# Patient Record
Sex: FEMALE | Race: WHITE | NOT HISPANIC OR LATINO | Employment: UNEMPLOYED | URBAN - NONMETROPOLITAN AREA
[De-identification: names, ages, dates, MRNs, and addresses within clinical notes are randomized per-mention and may not be internally consistent; named-entity substitution may affect disease eponyms.]

---

## 2017-01-05 ENCOUNTER — OFFICE VISIT (OUTPATIENT)
Dept: PAIN MEDICINE | Facility: CLINIC | Age: 62
End: 2017-01-05

## 2017-01-05 VITALS
WEIGHT: 278.5 LBS | DIASTOLIC BLOOD PRESSURE: 60 MMHG | BODY MASS INDEX: 46.4 KG/M2 | HEIGHT: 65 IN | SYSTOLIC BLOOD PRESSURE: 120 MMHG

## 2017-01-05 DIAGNOSIS — M79.18 MYOFASCIAL PAIN: ICD-10-CM

## 2017-01-05 DIAGNOSIS — M47.817 LUMBOSACRAL SPONDYLOSIS WITHOUT MYELOPATHY: Primary | ICD-10-CM

## 2017-01-05 DIAGNOSIS — Z79.899 HIGH RISK MEDICATIONS (NOT ANTICOAGULANTS) LONG-TERM USE: ICD-10-CM

## 2017-01-05 PROCEDURE — 99213 OFFICE O/P EST LOW 20 MIN: CPT | Performed by: PAIN MEDICINE

## 2017-01-05 RX ORDER — HYDROCODONE BITARTRATE AND ACETAMINOPHEN 7.5; 325 MG/1; MG/1
1 TABLET ORAL 4 TIMES DAILY
Qty: 120 TABLET | Refills: 0 | Status: SHIPPED | OUTPATIENT
Start: 2017-01-05 | End: 2017-02-04

## 2017-01-05 NOTE — PROGRESS NOTES
Mila Fisher is a 61 y.o. female.   1955    HPI:   Location: neck and lower back  Quality: aching, sharp and dull  Severity: 4/10  Timing: constant  Alleviating: pain medication  Aggravating: increased activity    Opioid providing good relief and allows increased activity.  Losing some weight while trying.  Doing well from a pain standpoint on current therapy.    The following portions of the patient's history were reviewed by me and updated as appropriate: allergies, current medications, past family history, past medical history, past social history, past surgical history and problem list.    Past Medical History   Diagnosis Date   • Acute bronchitis    • Anxiety      Anxiety state      • Arthropathy of lumbar facet joint    • Asthma    • Astigmatism    • At risk for adverse drug event      Adverse drug event resulting from treatment of disorder      • Back ache    • Benign essential hypertension    • Cholecystitis      mild on ultrasound      • Chronic back pain    • Contact dermatitis    • Cough    • Depression      Depressive disorder, not elsewhere classified      • Diabetes mellitus      no retinopathy      • Diabetes mellitus without complication      type II or unspecified type, not stated as uncontrolled      • Diarrhea    • Drug therapy      Other long term (current) drug therapy      • Dyspnea    • Electrocardiogram abnormal    • Epigastric pain    • Episodic mood disorder      Unspecified    • Esophageal reflux    • Esophagitis      grade II   • Foot pain      VS SMALL PHALANX AVULSION      • Generalized abdominal pain    • Generalized anxiety disorder    • History of colon polyps    • Hypermetropia    • Irritable bowel syndrome    • Knee pain 03/22/2016      being evaluated for knee replacements.       • Malaise and fatigue    • Myofascial pain    • Nausea and vomiting    • Neck pain    • LJ (obstructive sleep apnea)    • Osteoarthritis    • Pain in wrist    • Polyp of intestine       large intestine   • Pure hypercholesterolemia    • Right upper quadrant pain    • Screening for hyperlipidemia    • Shoulder pain 09/02/2014     possible Rotator Cuff Tendinitis      • Spasm of back muscles    • Sprain of sacroiliac ligament    • Transient cerebral ischemia    • Type 2 diabetes mellitus    • Type II diabetes mellitus, uncontrolled    • Upper respiratory infection    • Vitamin D deficiency        Social History     Social History   • Marital status:      Spouse name: N/A   • Number of children: N/A   • Years of education: N/A     Occupational History   • Not on file.     Social History Main Topics   • Smoking status: Former Smoker     Quit date: 1996   • Smokeless tobacco: Never Used      Comment: non smoker for years   • Alcohol use No   • Drug use: No   • Sexual activity: Defer     Other Topics Concern   • Not on file     Social History Narrative       Family History   Problem Relation Age of Onset   • Heart disease Other    • Bone cancer Other    • Lung cancer Other    • Throat cancer Other    • Cancer Other      Other   • Colon cancer Other      Colorectal Cancer   • Diabetes Other    • Hypertension Other          Current Outpatient Prescriptions:   •  albuterol (PROVENTIL HFA;VENTOLIN HFA) 108 (90 BASE) MCG/ACT inhaler, Inhale 2 puffs Every 4 (Four) Hours As Needed for wheezing., Disp: , Rfl:   •  albuterol (PROVENTIL) (2.5 MG/3ML) 0.083% nebulizer solution, Take 2.5 mg by nebulization Every 4 (Four) Hours As Needed for wheezing., Disp: , Rfl:   •  baclofen (LIORESAL) 10 MG tablet, Take 10 mg by mouth 3 (Three) Times a Day., Disp: , Rfl:   •  carvedilol (COREG) 6.25 MG tablet, Take 6.25 mg by mouth 2 (Two) Times a Day With Meals., Disp: , Rfl:   •  clopidogrel (PLAVIX) 75 MG tablet, Take 75 mg by mouth Daily., Disp: , Rfl:   •  dicyclomine (BENTYL) 10 MG capsule, Take 10 mg by mouth 4 (Four) Times a Day Before Meals & at Bedtime., Disp: , Rfl:   •  furosemide (LASIX) 80 MG tablet, Take  80 mg by mouth 2 (Two) Times a Day., Disp: , Rfl:   •  hydrOXYzine (VISTARIL) 50 MG capsule, Take 50 mg by mouth 3 (Three) Times a Day As Needed for itching., Disp: , Rfl:   •  insulin aspart protamine-insulin aspart (novoLOG 70/30) (70-30) 100 UNIT/ML injection, Inject  under the skin 2 (Two) Times a Day With Meals., Disp: , Rfl:   •  isosorbide mononitrate (IMDUR) 30 MG 24 hr tablet, Take 30 mg by mouth Daily., Disp: , Rfl:   •  lisinopril (PRINIVIL,ZESTRIL) 5 MG tablet, Take 5 mg by mouth Daily., Disp: , Rfl:   •  losartan (COZAAR) 50 MG tablet, Take 50 mg by mouth Daily., Disp: , Rfl:   •  magnesium oxide (MAGOX) 400 (241.3 MG) MG tablet tablet, Take 400 mg by mouth Daily., Disp: , Rfl:   •  meloxicam (MOBIC) 15 MG tablet, Take 15 mg by mouth Daily., Disp: , Rfl:   •  metFORMIN (GLUCOPHAGE) 1000 MG tablet, Take 1,000 mg by mouth 2 (Two) Times a Day With Meals., Disp: , Rfl:   •  metoprolol succinate XL (TOPROL-XL) 25 MG 24 hr tablet, Take 25 mg by mouth Daily., Disp: , Rfl:   •  pantoprazole (PROTONIX) 40 MG EC tablet, Take 40 mg by mouth Daily., Disp: , Rfl:   •  pravastatin (PRAVACHOL) 40 MG tablet, Take 40 mg by mouth Daily., Disp: , Rfl:   •  spironolactone (ALDACTONE) 25 MG tablet, , Disp: , Rfl: 11  •  vitamin D (ERGOCALCIFEROL) 30276 UNITS capsule capsule, Take 50,000 Units by mouth 1 (One) Time Per Week., Disp: , Rfl:   •  HYDROcodone-acetaminophen (NORCO) 7.5-325 MG per tablet, Take 1 tablet by mouth 4 (Four) Times a Day for 30 days., Disp: 120 tablet, Rfl: 0  •  HYDROcodone-acetaminophen (NORCO) 7.5-325 MG per tablet, Take 1 tablet by mouth 4 (Four) Times a Day for 30 days., Disp: 120 tablet, Rfl: 0    No Known Allergies      Review of Systems   Musculoskeletal: Positive for back pain (lower) and neck pain.     10 system review of systems was reviewed and negative except for above.    Physical Exam   Constitutional: She appears well-developed and well-nourished. No distress.   Musculoskeletal:         Lumbar back: She exhibits decreased range of motion (<5 deg ext with facet loading.  45 deg of flexion with pain) and tenderness.   Neurological: She is alert. Gait (antalgic) abnormal.   Psychiatric: She has a normal mood and affect. Her behavior is normal. Judgment normal.       Mila was seen today for neck pain and back pain.    Diagnoses and all orders for this visit:    Lumbosacral spondylosis without myelopathy    Myofascial pain    High risk medications (not anticoagulants) long-term use    Other orders  -     HYDROcodone-acetaminophen (NORCO) 7.5-325 MG per tablet; Take 1 tablet by mouth 4 (Four) Times a Day for 30 days.  -     HYDROcodone-acetaminophen (NORCO) 7.5-325 MG per tablet; Take 1 tablet by mouth 4 (Four) Times a Day for 30 days.        Medication: Patient reports no negative side effects, Patient reports appropriate usage and storage habits, Patient's opioid provides enough reflief to be more active and perform activities of daily living with less discomfort. and Refill opioid medication as above    Interventional: none at this time.  Pt currently not interested.  Has had shoulder injections in past an did not like these.    Rehab: none at this time.  Continues to try to lose wt. This was encouraged.    Behavioral: No aberrant behavior noted. [facility]AVYA Report #[id number]  was reviewed and is consistent with stated history    Urine drug screen None at this time          This document has been electronically signed by Bryce Edmonds MD on January 5, 2017 8:25 AM

## 2017-01-11 RX ORDER — DICYCLOMINE HCL 20 MG
TABLET ORAL
Qty: 60 TABLET | Refills: 0 | Status: SHIPPED | OUTPATIENT
Start: 2017-01-11 | End: 2017-03-15 | Stop reason: HOSPADM

## 2017-02-08 ENCOUNTER — APPOINTMENT (OUTPATIENT)
Dept: GENERAL RADIOLOGY | Facility: HOSPITAL | Age: 62
End: 2017-02-08

## 2017-02-08 ENCOUNTER — HOSPITAL ENCOUNTER (OUTPATIENT)
Facility: HOSPITAL | Age: 62
Setting detail: OBSERVATION
Discharge: HOME | End: 2017-02-09
Attending: EMERGENCY MEDICINE | Admitting: FAMILY MEDICINE

## 2017-02-08 DIAGNOSIS — R07.2 CHEST PAIN, PRECORDIAL: Primary | ICD-10-CM

## 2017-02-08 LAB
ALBUMIN SERPL-MCNC: 3.9 G/DL (ref 3.4–4.8)
ALBUMIN/GLOB SERPL: 1.4 G/DL (ref 1.1–1.8)
ALP SERPL-CCNC: 50 U/L (ref 38–126)
ALT SERPL W P-5'-P-CCNC: 26 U/L (ref 9–52)
ANION GAP SERPL CALCULATED.3IONS-SCNC: 9 MMOL/L (ref 5–15)
AST SERPL-CCNC: 27 U/L (ref 14–36)
BASOPHILS # BLD AUTO: 0.02 10*3/MM3 (ref 0–0.2)
BASOPHILS NFR BLD AUTO: 0.3 % (ref 0–2)
BILIRUB SERPL-MCNC: 0.3 MG/DL (ref 0.2–1.3)
BUN BLD-MCNC: 23 MG/DL (ref 7–21)
BUN/CREAT SERPL: 21.7 (ref 7–25)
CALCIUM SPEC-SCNC: 9.4 MG/DL (ref 8.4–10.2)
CHLORIDE SERPL-SCNC: 103 MMOL/L (ref 95–110)
CK MB SERPL-CCNC: 0.68 NG/ML (ref 0–5)
CK SERPL-CCNC: 75 U/L (ref 30–135)
CO2 SERPL-SCNC: 28 MMOL/L (ref 22–31)
CREAT BLD-MCNC: 1.06 MG/DL (ref 0.5–1)
D-DIMER, QUANTITATIVE (MAD,POW, STR): <270 NG/ML (FEU) (ref 0–470)
DEPRECATED RDW RBC AUTO: 42.8 FL (ref 36.4–46.3)
EOSINOPHIL # BLD AUTO: 0.16 10*3/MM3 (ref 0–0.7)
EOSINOPHIL NFR BLD AUTO: 2.3 % (ref 0–7)
ERYTHROCYTE [DISTWIDTH] IN BLOOD BY AUTOMATED COUNT: 14.3 % (ref 11.5–14.5)
GFR SERPL CREATININE-BSD FRML MDRD: 52 ML/MIN/1.73 (ref 45–104)
GLOBULIN UR ELPH-MCNC: 2.7 GM/DL (ref 2.3–3.5)
GLUCOSE BLD-MCNC: 192 MG/DL (ref 60–100)
GLUCOSE BLDC GLUCOMTR-MCNC: 153 MG/DL (ref 70–130)
HCT VFR BLD AUTO: 30.9 % (ref 35–45)
HGB BLD-MCNC: 10.6 G/DL (ref 12–15.5)
IMM GRANULOCYTES # BLD: 0.02 10*3/MM3 (ref 0–0.02)
IMM GRANULOCYTES NFR BLD: 0.3 % (ref 0–0.5)
LYMPHOCYTES # BLD AUTO: 2.78 10*3/MM3 (ref 0.6–4.2)
LYMPHOCYTES NFR BLD AUTO: 40.7 % (ref 10–50)
MCH RBC QN AUTO: 28.1 PG (ref 26.5–34)
MCHC RBC AUTO-ENTMCNC: 34.3 G/DL (ref 31.4–36)
MCV RBC AUTO: 82 FL (ref 80–98)
MONOCYTES # BLD AUTO: 0.43 10*3/MM3 (ref 0–0.9)
MONOCYTES NFR BLD AUTO: 6.3 % (ref 0–12)
NEUTROPHILS # BLD AUTO: 3.42 10*3/MM3 (ref 2–8.6)
NEUTROPHILS NFR BLD AUTO: 50.1 % (ref 37–80)
NT-PROBNP SERPL-MCNC: 284 PG/ML (ref 0–900)
PLATELET # BLD AUTO: 166 10*3/MM3 (ref 150–450)
PMV BLD AUTO: 11.2 FL (ref 8–12)
POTASSIUM BLD-SCNC: 3.9 MMOL/L (ref 3.5–5.1)
PROT SERPL-MCNC: 6.6 G/DL (ref 6.3–8.6)
RBC # BLD AUTO: 3.77 10*6/MM3 (ref 3.77–5.16)
SODIUM BLD-SCNC: 140 MMOL/L (ref 137–145)
TROPONIN I SERPL-MCNC: <0.012 NG/ML
WBC NRBC COR # BLD: 6.83 10*3/MM3 (ref 3.2–9.8)

## 2017-02-08 PROCEDURE — 84484 ASSAY OF TROPONIN QUANT: CPT | Performed by: STUDENT IN AN ORGANIZED HEALTH CARE EDUCATION/TRAINING PROGRAM

## 2017-02-08 PROCEDURE — 82550 ASSAY OF CK (CPK): CPT | Performed by: STUDENT IN AN ORGANIZED HEALTH CARE EDUCATION/TRAINING PROGRAM

## 2017-02-08 PROCEDURE — 93005 ELECTROCARDIOGRAM TRACING: CPT

## 2017-02-08 PROCEDURE — 99285 EMERGENCY DEPT VISIT HI MDM: CPT

## 2017-02-08 PROCEDURE — 85025 COMPLETE CBC W/AUTO DIFF WBC: CPT | Performed by: STUDENT IN AN ORGANIZED HEALTH CARE EDUCATION/TRAINING PROGRAM

## 2017-02-08 PROCEDURE — 82553 CREATINE MB FRACTION: CPT | Performed by: STUDENT IN AN ORGANIZED HEALTH CARE EDUCATION/TRAINING PROGRAM

## 2017-02-08 PROCEDURE — 82962 GLUCOSE BLOOD TEST: CPT

## 2017-02-08 PROCEDURE — 80053 COMPREHEN METABOLIC PANEL: CPT | Performed by: STUDENT IN AN ORGANIZED HEALTH CARE EDUCATION/TRAINING PROGRAM

## 2017-02-08 PROCEDURE — G0378 HOSPITAL OBSERVATION PER HR: HCPCS

## 2017-02-08 PROCEDURE — 83880 ASSAY OF NATRIURETIC PEPTIDE: CPT | Performed by: STUDENT IN AN ORGANIZED HEALTH CARE EDUCATION/TRAINING PROGRAM

## 2017-02-08 PROCEDURE — 85379 FIBRIN DEGRADATION QUANT: CPT | Performed by: STUDENT IN AN ORGANIZED HEALTH CARE EDUCATION/TRAINING PROGRAM

## 2017-02-08 PROCEDURE — 93010 ELECTROCARDIOGRAM REPORT: CPT | Performed by: INTERNAL MEDICINE

## 2017-02-08 RX ORDER — ASPIRIN 325 MG
325 TABLET ORAL ONCE
Status: COMPLETED | OUTPATIENT
Start: 2017-02-08 | End: 2017-02-08

## 2017-02-08 RX ORDER — DEXTROSE MONOHYDRATE 25 G/50ML
25 INJECTION, SOLUTION INTRAVENOUS
Status: DISCONTINUED | OUTPATIENT
Start: 2017-02-08 | End: 2017-02-09 | Stop reason: HOSPADM

## 2017-02-08 RX ORDER — NICOTINE POLACRILEX 4 MG
15 LOZENGE BUCCAL
Status: DISCONTINUED | OUTPATIENT
Start: 2017-02-08 | End: 2017-02-09 | Stop reason: HOSPADM

## 2017-02-08 RX ADMIN — ASPIRIN 325 MG: 325 TABLET, COATED ORAL at 19:54

## 2017-02-08 RX ADMIN — NITROGLYCERIN 1 INCH: 20 OINTMENT TOPICAL at 19:57

## 2017-02-08 NOTE — ED NOTES
Pt. Referred to the ED by Urgent Care for chest pain under her left breast, and shortness of breath. Ticket from urgent care noted left sided pitting edema also.     Rahel Gutierrez RN  02/08/17 1352

## 2017-02-09 VITALS
RESPIRATION RATE: 16 BRPM | SYSTOLIC BLOOD PRESSURE: 150 MMHG | HEART RATE: 52 BPM | HEIGHT: 65 IN | DIASTOLIC BLOOD PRESSURE: 75 MMHG | BODY MASS INDEX: 47.24 KG/M2 | OXYGEN SATURATION: 95 % | WEIGHT: 283.56 LBS | TEMPERATURE: 96.5 F

## 2017-02-09 LAB
ALBUMIN SERPL-MCNC: 3.6 G/DL (ref 3.4–4.8)
ALBUMIN/GLOB SERPL: 1.4 G/DL (ref 1.1–1.8)
ALP SERPL-CCNC: 48 U/L (ref 38–126)
ALT SERPL W P-5'-P-CCNC: 16 U/L (ref 9–52)
ANION GAP SERPL CALCULATED.3IONS-SCNC: 7 MMOL/L (ref 5–15)
AST SERPL-CCNC: 15 U/L (ref 14–36)
BASOPHILS # BLD AUTO: 0.02 10*3/MM3 (ref 0–0.2)
BASOPHILS NFR BLD AUTO: 0.4 % (ref 0–2)
BILIRUB SERPL-MCNC: 0.3 MG/DL (ref 0.2–1.3)
BUN BLD-MCNC: 23 MG/DL (ref 7–21)
BUN/CREAT SERPL: 22.5 (ref 7–25)
CALCIUM SPEC-SCNC: 9.1 MG/DL (ref 8.4–10.2)
CHLORIDE SERPL-SCNC: 103 MMOL/L (ref 95–110)
CO2 SERPL-SCNC: 27 MMOL/L (ref 22–31)
CREAT BLD-MCNC: 1.02 MG/DL (ref 0.5–1)
D-DIMER, QUANTITATIVE (MAD,POW, STR): <270 NG/ML (FEU) (ref 0–470)
DEPRECATED RDW RBC AUTO: 40 FL (ref 36.4–46.3)
EOSINOPHIL # BLD AUTO: 0.1 10*3/MM3 (ref 0–0.7)
EOSINOPHIL NFR BLD AUTO: 2.1 % (ref 0–7)
ERYTHROCYTE [DISTWIDTH] IN BLOOD BY AUTOMATED COUNT: 13.3 % (ref 11.5–14.5)
GFR SERPL CREATININE-BSD FRML MDRD: 55 ML/MIN/1.73 (ref 45–104)
GLOBULIN UR ELPH-MCNC: 2.6 GM/DL (ref 2.3–3.5)
GLUCOSE BLD-MCNC: 181 MG/DL (ref 60–100)
GLUCOSE BLDC GLUCOMTR-MCNC: 192 MG/DL (ref 70–130)
GLUCOSE BLDC GLUCOMTR-MCNC: 275 MG/DL (ref 70–130)
HCT VFR BLD AUTO: 36.7 % (ref 35–45)
HGB BLD-MCNC: 12.1 G/DL (ref 12–15.5)
HOLD SPECIMEN: NORMAL
HOLD SPECIMEN: NORMAL
IMM GRANULOCYTES # BLD: 0.01 10*3/MM3 (ref 0–0.02)
IMM GRANULOCYTES NFR BLD: 0.2 % (ref 0–0.5)
LYMPHOCYTES # BLD AUTO: 2.04 10*3/MM3 (ref 0.6–4.2)
LYMPHOCYTES NFR BLD AUTO: 42.2 % (ref 10–50)
MCH RBC QN AUTO: 27.4 PG (ref 26.5–34)
MCHC RBC AUTO-ENTMCNC: 33 G/DL (ref 31.4–36)
MCV RBC AUTO: 83.2 FL (ref 80–98)
MONOCYTES # BLD AUTO: 0.28 10*3/MM3 (ref 0–0.9)
MONOCYTES NFR BLD AUTO: 5.8 % (ref 0–12)
NEUTROPHILS # BLD AUTO: 2.38 10*3/MM3 (ref 2–8.6)
NEUTROPHILS NFR BLD AUTO: 49.3 % (ref 37–80)
PLATELET # BLD AUTO: 126 10*3/MM3 (ref 150–450)
PMV BLD AUTO: 10.8 FL (ref 8–12)
POTASSIUM BLD-SCNC: 3.9 MMOL/L (ref 3.5–5.1)
PROT SERPL-MCNC: 6.2 G/DL (ref 6.3–8.6)
RBC # BLD AUTO: 4.41 10*6/MM3 (ref 3.77–5.16)
SODIUM BLD-SCNC: 137 MMOL/L (ref 137–145)
TROPONIN I SERPL-MCNC: <0.012 NG/ML
WBC NRBC COR # BLD: 4.83 10*3/MM3 (ref 3.2–9.8)
WHOLE BLOOD HOLD SPECIMEN: NORMAL
WHOLE BLOOD HOLD SPECIMEN: NORMAL

## 2017-02-09 PROCEDURE — 82962 GLUCOSE BLOOD TEST: CPT

## 2017-02-09 PROCEDURE — G0378 HOSPITAL OBSERVATION PER HR: HCPCS

## 2017-02-09 PROCEDURE — 63710000001 INSULIN ASPART PER 5 UNITS: Performed by: INTERNAL MEDICINE

## 2017-02-09 PROCEDURE — 85025 COMPLETE CBC W/AUTO DIFF WBC: CPT | Performed by: INTERNAL MEDICINE

## 2017-02-09 PROCEDURE — 85379 FIBRIN DEGRADATION QUANT: CPT | Performed by: INTERNAL MEDICINE

## 2017-02-09 PROCEDURE — 84484 ASSAY OF TROPONIN QUANT: CPT | Performed by: INTERNAL MEDICINE

## 2017-02-09 PROCEDURE — 80053 COMPREHEN METABOLIC PANEL: CPT | Performed by: INTERNAL MEDICINE

## 2017-02-09 RX ORDER — CLOPIDOGREL BISULFATE 75 MG/1
75 TABLET ORAL DAILY
Status: DISCONTINUED | OUTPATIENT
Start: 2017-02-09 | End: 2017-02-09 | Stop reason: HOSPADM

## 2017-02-09 RX ORDER — PANTOPRAZOLE SODIUM 40 MG/1
40 TABLET, DELAYED RELEASE ORAL DAILY
Status: DISCONTINUED | OUTPATIENT
Start: 2017-02-09 | End: 2017-02-09 | Stop reason: HOSPADM

## 2017-02-09 RX ORDER — FUROSEMIDE 40 MG/1
80 TABLET ORAL 2 TIMES DAILY
Status: DISCONTINUED | OUTPATIENT
Start: 2017-02-09 | End: 2017-02-09 | Stop reason: HOSPADM

## 2017-02-09 RX ORDER — LOSARTAN POTASSIUM 50 MG/1
50 TABLET ORAL DAILY
Status: DISCONTINUED | OUTPATIENT
Start: 2017-02-09 | End: 2017-02-09 | Stop reason: HOSPADM

## 2017-02-09 RX ORDER — ALBUTEROL SULFATE 2.5 MG/3ML
2.5 SOLUTION RESPIRATORY (INHALATION) EVERY 4 HOURS PRN
Status: DISCONTINUED | OUTPATIENT
Start: 2017-02-09 | End: 2017-02-09 | Stop reason: HOSPADM

## 2017-02-09 RX ORDER — PRAVASTATIN SODIUM 40 MG
40 TABLET ORAL DAILY
Status: DISCONTINUED | OUTPATIENT
Start: 2017-02-09 | End: 2017-02-09 | Stop reason: HOSPADM

## 2017-02-09 RX ORDER — LISINOPRIL 5 MG/1
5 TABLET ORAL DAILY
Status: DISCONTINUED | OUTPATIENT
Start: 2017-02-09 | End: 2017-02-09 | Stop reason: HOSPADM

## 2017-02-09 RX ORDER — DICYCLOMINE HYDROCHLORIDE 10 MG/1
10 CAPSULE ORAL
Status: DISCONTINUED | OUTPATIENT
Start: 2017-02-09 | End: 2017-02-09 | Stop reason: HOSPADM

## 2017-02-09 RX ORDER — METOPROLOL SUCCINATE 25 MG/1
25 TABLET, EXTENDED RELEASE ORAL DAILY
Status: DISCONTINUED | OUTPATIENT
Start: 2017-02-09 | End: 2017-02-09 | Stop reason: HOSPADM

## 2017-02-09 RX ORDER — ERGOCALCIFEROL 1.25 MG/1
50000 CAPSULE ORAL WEEKLY
Status: DISCONTINUED | OUTPATIENT
Start: 2017-02-09 | End: 2017-02-09 | Stop reason: HOSPADM

## 2017-02-09 RX ORDER — ISOSORBIDE MONONITRATE 30 MG/1
30 TABLET, EXTENDED RELEASE ORAL DAILY
Status: DISCONTINUED | OUTPATIENT
Start: 2017-02-09 | End: 2017-02-09 | Stop reason: HOSPADM

## 2017-02-09 RX ORDER — ONDANSETRON 2 MG/ML
4 INJECTION INTRAMUSCULAR; INTRAVENOUS EVERY 6 HOURS PRN
Status: DISCONTINUED | OUTPATIENT
Start: 2017-02-09 | End: 2017-02-09 | Stop reason: HOSPADM

## 2017-02-09 RX ORDER — MELOXICAM 15 MG/1
15 TABLET ORAL DAILY
Status: DISCONTINUED | OUTPATIENT
Start: 2017-02-09 | End: 2017-02-09 | Stop reason: HOSPADM

## 2017-02-09 RX ORDER — CARVEDILOL 6.25 MG/1
6.25 TABLET ORAL 2 TIMES DAILY WITH MEALS
Status: DISCONTINUED | OUTPATIENT
Start: 2017-02-09 | End: 2017-02-09 | Stop reason: HOSPADM

## 2017-02-09 RX ORDER — HYDROXYZINE PAMOATE 50 MG/1
50 CAPSULE ORAL 3 TIMES DAILY PRN
Status: DISCONTINUED | OUTPATIENT
Start: 2017-02-09 | End: 2017-02-09 | Stop reason: HOSPADM

## 2017-02-09 RX ORDER — ACETAMINOPHEN 325 MG/1
650 TABLET ORAL EVERY 6 HOURS PRN
Status: DISCONTINUED | OUTPATIENT
Start: 2017-02-09 | End: 2017-02-09 | Stop reason: HOSPADM

## 2017-02-09 RX ORDER — ALBUTEROL SULFATE 2.5 MG/3ML
2.5 SOLUTION RESPIRATORY (INHALATION) EVERY 4 HOURS PRN
Status: DISCONTINUED | OUTPATIENT
Start: 2017-02-09 | End: 2017-02-09 | Stop reason: SDUPTHER

## 2017-02-09 RX ADMIN — INSULIN ASPART 4 UNITS: 100 INJECTION, SOLUTION INTRAVENOUS; SUBCUTANEOUS at 11:32

## 2017-02-09 RX ADMIN — CARVEDILOL 6.25 MG: 6.25 TABLET, FILM COATED ORAL at 07:53

## 2017-02-09 RX ADMIN — DICYCLOMINE HYDROCHLORIDE 10 MG: 10 CAPSULE ORAL at 10:42

## 2017-02-09 RX ADMIN — MAGNESIUM OXIDE TAB 400 MG (241.3 MG ELEMENTAL MG) 400 MG: 400 (241.3 MG) TAB at 08:13

## 2017-02-09 RX ADMIN — PANTOPRAZOLE SODIUM 40 MG: 40 TABLET, DELAYED RELEASE ORAL at 08:14

## 2017-02-09 RX ADMIN — MELOXICAM 15 MG: 15 TABLET ORAL at 08:14

## 2017-02-09 RX ADMIN — INSULIN ASPART 2 UNITS: 100 INJECTION, SOLUTION INTRAVENOUS; SUBCUTANEOUS at 07:53

## 2017-02-09 RX ADMIN — LOSARTAN POTASSIUM 50 MG: 50 TABLET, FILM COATED ORAL at 08:13

## 2017-02-09 RX ADMIN — PRAVASTATIN SODIUM 40 MG: 40 TABLET ORAL at 08:14

## 2017-02-09 RX ADMIN — DICYCLOMINE HYDROCHLORIDE 10 MG: 10 CAPSULE ORAL at 08:13

## 2017-02-09 RX ADMIN — METFORMIN HYDROCHLORIDE 1000 MG: 500 TABLET ORAL at 08:14

## 2017-02-09 RX ADMIN — METOPROLOL SUCCINATE 25 MG: 25 TABLET, EXTENDED RELEASE ORAL at 08:13

## 2017-02-09 RX ADMIN — CLOPIDOGREL BISULFATE 75 MG: 75 TABLET ORAL at 08:13

## 2017-02-09 RX ADMIN — FUROSEMIDE 80 MG: 40 TABLET ORAL at 08:13

## 2017-02-09 RX ADMIN — ISOSORBIDE MONONITRATE 30 MG: 30 TABLET, EXTENDED RELEASE ORAL at 08:13

## 2017-02-09 RX ADMIN — LISINOPRIL 5 MG: 5 TABLET ORAL at 08:13

## 2017-02-09 NOTE — CONSULTS
"Adult Nutrition  Assessment    Patient Name:  Mila Fisher  YOB: 1955  MRN: [id number]  Admit Date:  2/8/2017    Assessment Date:  2/9/2017                            Comments:  Visited due to BMI of 47.2 which is compatible with morbid obesity.  Pt also with hx of DM. Education provided on \"Healthy weight\" and Diabetes Guidelines.  Rd will monitor prn.        Electronically signed by:  Cara Vang RD  02/09/17 11:05 AM  "

## 2017-02-09 NOTE — H&P
[facility] Medicine Admission      Date of Admission: 2/8/2017      Primary Care Physician: Casey López MD      Chief Complaint: Chest pain    HPI: Currently stated that chest pains resolved is not complaining of any shortness of breath feels okay    Past Medical History:   Past Medical History   Diagnosis Date   • Acute bronchitis    • Anxiety      Anxiety state      • Arthropathy of lumbar facet joint    • Asthma    • Astigmatism    • At risk for adverse drug event      Adverse drug event resulting from treatment of disorder      • Back ache    • Benign essential hypertension    • Cholecystitis      mild on ultrasound      • Chronic back pain    • Contact dermatitis    • COPD (chronic obstructive pulmonary disease)    • Cough    • Depression      Depressive disorder, not elsewhere classified      • Diabetes mellitus      no retinopathy      • Diabetes mellitus without complication      type II or unspecified type, not stated as uncontrolled      • Diarrhea    • Drug therapy      Other long term (current) drug therapy      • Dyspnea    • Electrocardiogram abnormal    • Epigastric pain    • Episodic mood disorder      Unspecified    • Esophageal reflux    • Esophagitis      grade II   • Foot pain      VS SMALL PHALANX AVULSION      • Generalized abdominal pain    • Generalized anxiety disorder    • History of colon polyps    • Hypermetropia    • Irritable bowel syndrome    • Knee pain 03/22/2016      being evaluated for knee replacements.       • Malaise and fatigue    • Myofascial pain    • Nausea and vomiting    • Neck pain    • LJ (obstructive sleep apnea)    • Osteoarthritis    • Pain in wrist    • Polyp of intestine      large intestine   • Pure hypercholesterolemia    • Right upper quadrant pain    • Screening for hyperlipidemia    • Shoulder pain 09/02/2014     possible Rotator Cuff Tendinitis      • Spasm of back muscles    • Sprain of sacroiliac ligament     • Transient cerebral ischemia    • Type 2 diabetes mellitus    • Type II diabetes mellitus, uncontrolled    • Upper respiratory infection    • Vitamin D deficiency        Past Surgical History:   Past Surgical History   Procedure Laterality Date   • Colonoscopy     • Upper gastrointestinal endoscopy     • Carpal tunnel release     • Colonoscopy w/ polypectomy  06/10/2015     Colonoscopy remove polyps 96125 (1)      • Injection of medication  09/02/2014     Drain/Inject Intermed Joint 18280 (1)      • Endoscopy  06/10/2015     EGD w/ biopsy 91483 (1)      • Tubal abdominal ligation       Tubal ligation (1)          Family History:   Family History   Problem Relation Age of Onset   • Heart disease Other    • Bone cancer Other    • Lung cancer Other    • Throat cancer Other    • Cancer Other      Other   • Colon cancer Other      Colorectal Cancer   • Diabetes Other    • Hypertension Other        Social History:   Social History     Social History   • Marital status:      Spouse name: N/A   • Number of children: N/A   • Years of education: N/A     Social History Main Topics   • Smoking status: Former Smoker     Quit date: 1996   • Smokeless tobacco: Never Used      Comment: non smoker for years   • Alcohol use No   • Drug use: No   • Sexual activity: Defer     Other Topics Concern   • None     Social History Narrative   • None       Allergies: No Known Allergies    Medications:   Prior to Admission medications    Medication Sig Start Date End Date Taking? Authorizing Provider   albuterol (PROVENTIL HFA;VENTOLIN HFA) 108 (90 BASE) MCG/ACT inhaler Inhale 2 puffs Every 4 (Four) Hours As Needed for wheezing.   Yes Historical Provider, MD   albuterol (PROVENTIL) (2.5 MG/3ML) 0.083% nebulizer solution Take 2.5 mg by nebulization Every 4 (Four) Hours As Needed for wheezing.   Yes Historical Provider, MD   clopidogrel (PLAVIX) 75 MG tablet Take 75 mg by mouth Daily.   Yes Historical Provider, MD   furosemide (LASIX)  80 MG tablet Take 80 mg by mouth 2 (Two) Times a Day.   Yes Historical Provider, MD   insulin aspart protamine-insulin aspart (novoLOG 70/30) (70-30) 100 UNIT/ML injection Inject  under the skin 2 (Two) Times a Day With Meals.   Yes Historical Provider, MD   lisinopril (PRINIVIL,ZESTRIL) 5 MG tablet Take 5 mg by mouth Daily.   Yes Historical Provider, MD   magnesium oxide (MAGOX) 400 (241.3 MG) MG tablet tablet Take 400 mg by mouth Daily.   Yes Historical Provider, MD   meloxicam (MOBIC) 15 MG tablet Take 15 mg by mouth Daily.   Yes Historical Provider, MD   metFORMIN (GLUCOPHAGE) 1000 MG tablet Take 1,000 mg by mouth 2 (Two) Times a Day With Meals.   Yes Historical Provider, MD   metoprolol succinate XL (TOPROL-XL) 25 MG 24 hr tablet Take 25 mg by mouth Daily.   Yes Historical Provider, MD   pantoprazole (PROTONIX) 40 MG EC tablet Take 40 mg by mouth Daily.   Yes Historical Provider, MD   pravastatin (PRAVACHOL) 40 MG tablet Take 40 mg by mouth Daily.   Yes Historical Provider, MD   spironolactone (ALDACTONE) 25 MG tablet  10/31/16  Yes Historical Provider, MD   vitamin D (ERGOCALCIFEROL) 01645 UNITS capsule capsule Take 50,000 Units by mouth 1 (One) Time Per Week.   Yes Historical Provider, MD   baclofen (LIORESAL) 10 MG tablet Take 10 mg by mouth 3 (Three) Times a Day.    Historical Provider, MD   carvedilol (COREG) 6.25 MG tablet Take 6.25 mg by mouth 2 (Two) Times a Day With Meals.    Historical Provider, MD   dicyclomine (BENTYL) 10 MG capsule Take 10 mg by mouth 4 (Four) Times a Day Before Meals & at Bedtime.    Historical Provider, MD   dicyclomine (BENTYL) 20 MG tablet TAKE 1 TABLET BY MOUTH EVERY 6 HOURS 1/11/17   Jarett Mcdonough PA-C   hydrOXYzine (VISTARIL) 50 MG capsule Take 50 mg by mouth 3 (Three) Times a Day As Needed for itching.    Historical Provider, MD   isosorbide mononitrate (IMDUR) 30 MG 24 hr tablet Take 30 mg by mouth Daily.    Historical Provider, MD   losartan (COZAAR) 50 MG tablet Take  50 mg by mouth Daily.    Historical Provider, MD       Review of Systems:  Review of Systems   Otherwise complete ROS is negative except as mentioned above.    Physical Exam:   Temp:  [98.4 °F (36.9 °C)-99.3 °F (37.4 °C)] 98.4 °F (36.9 °C)  Heart Rate:  [50-58] 50  Resp:  [18-20] 18  BP: (105-141)/(53-72) 123/58  Physical Exam  Patient appears well, alert and oriented x 3, pleasant, cooperative.   Neck supple  No JVD No thyromegaly.  REBEKA. Ears, throat are normal.  Lungs are clear to auscultation. No crackles no wheezing   CV S1S2 normal, no murmurs, clicks, gallops or rubs.  Abdomen is soft, no tenderness, masses or organomegaly.    Extremities: no clubbing cyanosis or edema   Neurological CN 2-12 intact   Skin is normal without suspicious lesions noted.    Results Reviewed:  I have personally reviewed current lab, radiology, and data and agree with results.  Lab Results (last 24 hours)     Procedure Component Value Units Date/Time    [place] Draw [[id number]] Collected:  02/08/17 1922    Specimen:  Blood Updated:  02/08/17 2005    Narrative:       The following orders were created for panel order [place] Draw.  Procedure                               Abnormality         Status                     ---------                               -----------         ------                     Light Blue Top[[id number]]                                    In process                 Green Top (Gel)[[id number]]                                   In process                 Lavender Top[[id number]]                                      In process                 Gold Top - SST[[id number]]                                    In process                   Please view results for these tests on the individual orders.    Green Top (Gel) [[id number]] Collected:  02/08/17 1922    Specimen:  Blood Updated:  02/08/17 2005    Light Blue Top [[id number]] Collected:  02/08/17 1922    Specimen:  Blood Updated:  02/08/17 2005    Gold Top - SST [[id number]]  Collected:  02/08/17 1922    Specimen:  Blood Updated:  02/08/17 2005    Lavender Top [[id number]] Collected:  02/08/17 1922    Specimen:  Blood Updated:  02/08/17 2005    D-dimer, Quantitative [[id number]]  (Normal) Collected:  02/08/17 1922    Specimen:  Blood Updated:  02/08/17 2023     D-Dimer, Quantitative <270 ng/mL (FEU)     Narrative:       Dimer values <500 ng/ml FEU are FDA approved as aid in diagnosis of deep venous thrombosis and pulmonary embolism.  This test should not be used in an exclusion strategy with pretest probability alone.    A recent guideline regarding diagnosis for pulmonary thomboembolism recommends an adjusted exclusion criterion of age x 10 ng/ml FEU for patients >50 years of age ([employer] Intern Med 2015; 163: 701-711).    CBC & Differential [[id number]] Collected:  02/08/17 1922    Specimen:  Blood Updated:  02/08/17 2054    Narrative:       The following orders were created for panel order CBC & Differential.  Procedure                               Abnormality         Status                     ---------                               -----------         ------                     CBC Auto Differential[[id number]]         Abnormal            Final result                 Please view results for these tests on the individual orders.    CBC Auto Differential [[id number]]  (Abnormal) Collected:  02/08/17 1922    Specimen:  Blood Updated:  02/08/17 2054     WBC 6.83 10*3/mm3      RBC 3.77 10*6/mm3      Hemoglobin 10.6 (L) g/dL      Hematocrit 30.9 (L) %      MCV 82.0 fL      MCH 28.1 pg      MCHC 34.3 g/dL      RDW 14.3 %      RDW-SD 42.8 fl      MPV 11.2 fL      Platelets 166 10*3/mm3      Neutrophil % 50.1 %      Lymphocyte % 40.7 %      Monocyte % 6.3 %      Eosinophil % 2.3 %      Basophil % 0.3 %      Immature Grans % 0.3 %      Neutrophils, Absolute 3.42 10*3/mm3      Lymphocytes, Absolute 2.78 10*3/mm3      Monocytes, Absolute 0.43 10*3/mm3      Eosinophils, Absolute 0.16 10*3/mm3      Basophils,  Absolute 0.02 10*3/mm3      Immature Grans, Absolute 0.02 10*3/mm3     Comprehensive Metabolic Panel [[id number]]  (Abnormal) Collected:  02/08/17 1922    Specimen:  Blood Updated:  02/08/17 2059     Glucose 192 (H) mg/dL      BUN 23 (H) mg/dL      Creatinine 1.06 (H) mg/dL      Sodium 140 mmol/L      Potassium 3.9 mmol/L      Chloride 103 mmol/L      CO2 28.0 mmol/L      Calcium 9.4 mg/dL      Total Protein 6.6 g/dL      Albumin 3.90 g/dL      ALT (SGPT) 26 U/L      AST (SGOT) 27 U/L      Alkaline Phosphatase 50 U/L      Total Bilirubin 0.3 mg/dL      eGFR Non African Amer 52 mL/min/1.73      Globulin 2.7 gm/dL      A/G Ratio 1.4 g/dL      BUN/Creatinine Ratio 21.7      Anion Gap 9.0 mmol/L     CK [[id number]]  (Normal) Collected:  02/08/17 1922    Specimen:  Blood Updated:  02/08/17 2059     Creatine Kinase 75 U/L     BNP [[id number]]  (Normal) Collected:  02/08/17 1922    Specimen:  Blood Updated:  02/08/17 2111     proBNP 284.0 pg/mL     Troponin [[id number]]  (Normal) Collected:  02/08/17 1922    Specimen:  Blood Updated:  02/08/17 2111     Troponin I <0.012 ng/mL     CK-MB [[id number]]  (Normal) Collected:  02/08/17 1922    Specimen:  Blood Updated:  02/08/17 2111     CKMB 0.68 ng/mL         Imaging Results (last 24 hours)     ** No results found for the last 24 hours. **            Assessment/Plan:     Hospital Problem List     Chest pain, precordial                Plan: Chest pain atypical evidence of acute coronary syndrome at this time but since the patient does have risk factors will observe patient overnight to check cardiac enzymes  Diabetes we'll put the patient on sliding scale insulin        Dakota Turcios MD  02/08/17  10:34 PM

## 2017-02-09 NOTE — PLAN OF CARE
Problem: Patient Care Overview (Adult)  Goal: Plan of Care Review  Outcome: Ongoing (interventions implemented as appropriate)    02/09/17 0637   Coping/Psychosocial Response Interventions   Plan Of Care Reviewed With patient   Patient Care Overview   Progress improving   Outcome Evaluation   Outcome Summary/Follow up Plan pt rested comfortably throughout the night         Problem: Acute Coronary Syndrome (ACS) (Adult)  Goal: Signs and Symptoms of Listed Potential Problems Will be Absent or Manageable (Acute Coronary Syndrome)  Outcome: Ongoing (interventions implemented as appropriate)

## 2017-02-09 NOTE — DISCHARGE SUMMARY
[facility] Discharge Summary        Date of Admission: 2/8/2017  Date of Discharge:  2/9/2017    Presenting Problem:  Chest pain, precordial [R07.2]       Final Discharge Diagnoses:  Hospital Problem List     Chest pain, precordial          Consults:   Consults     Date and Time Order Name Status Description    2/8/2017 4715 Hospitalist (on-call MD unless specified)                        Hospital Course: patient admitted secondary to chest pain and was admitted through the er.  Serial cardiac enzymes were drawn and telemetry was followled.  The following day patient showed no elevation in cardiac enzymes and telemetry showed no changes.  Chest pain had resolved at that time    Condition on Discharge:  stable    Discharge Disposition:  Home or Self Care    Discharge Medications:   Mila Fisher   Home Medication Instructions BRADLY:[id number]    Printed on:02/09/17 8301   Medication Information                      albuterol (PROVENTIL HFA;VENTOLIN HFA) 108 (90 BASE) MCG/ACT inhaler  Inhale 2 puffs Every 4 (Four) Hours As Needed for wheezing.             albuterol (PROVENTIL) (2.5 MG/3ML) 0.083% nebulizer solution  Take 2.5 mg by nebulization Every 4 (Four) Hours As Needed for wheezing.             baclofen (LIORESAL) 10 MG tablet  Take 10 mg by mouth 3 (Three) Times a Day.             carvedilol (COREG) 6.25 MG tablet  Take 6.25 mg by mouth 2 (Two) Times a Day With Meals.             clopidogrel (PLAVIX) 75 MG tablet  Take 75 mg by mouth Daily.             dicyclomine (BENTYL) 10 MG capsule  Take 10 mg by mouth 4 (Four) Times a Day Before Meals & at Bedtime.             dicyclomine (BENTYL) 20 MG tablet  TAKE 1 TABLET BY MOUTH EVERY 6 HOURS             furosemide (LASIX) 80 MG tablet  Take 80 mg by mouth 2 (Two) Times a Day.             hydrOXYzine (VISTARIL) 50 MG capsule  Take 50 mg by mouth 3 (Three) Times a Day As Needed for itching.             insulin aspart protamine-insulin aspart (novoLOG  70/30) (70-30) 100 UNIT/ML injection  Inject  under the skin 2 (Two) Times a Day With Meals.             isosorbide mononitrate (IMDUR) 30 MG 24 hr tablet  Take 30 mg by mouth Daily.             lisinopril (PRINIVIL,ZESTRIL) 5 MG tablet  Take 5 mg by mouth Daily.             losartan (COZAAR) 50 MG tablet  Take 50 mg by mouth Daily.             magnesium oxide (MAGOX) 400 (241.3 MG) MG tablet tablet  Take 400 mg by mouth Daily.             meloxicam (MOBIC) 15 MG tablet  Take 15 mg by mouth Daily.             metFORMIN (GLUCOPHAGE) 1000 MG tablet  Take 1,000 mg by mouth 2 (Two) Times a Day With Meals.             metoprolol succinate XL (TOPROL-XL) 25 MG 24 hr tablet  Take 25 mg by mouth Daily.             pantoprazole (PROTONIX) 40 MG EC tablet  Take 40 mg by mouth Daily.             pravastatin (PRAVACHOL) 40 MG tablet  Take 40 mg by mouth Daily.             spironolactone (ALDACTONE) 25 MG tablet               vitamin D (ERGOCALCIFEROL) 60225 UNITS capsule capsule  Take 50,000 Units by mouth 1 (One) Time Per Week.                 Discharge Diet:   Diet Instructions     Diet: Consistent Carbohydrate; Thin Liquids, No Restrictions       Discharge Diet:  Consistent Carbohydrate   Fluid Consistency:  Thin Liquids, No Restrictions   Diabetic diet                 Activity at Discharge:   Activity Instructions     Activity as Tolerated                     Discharge Care Plan/Instructions: follow with dr. López in 1 week    Follow-up Appointments:   Future Appointments  Date Time Provider Department Center   2/13/2017 3:15 PM Jarett Mcdonough PA-C [facility] None   3/7/2017 10:30 AM MD [facility] None       Test Results Pending at Discharge:     Casey López MD  02/09/17  12:29 PM

## 2017-02-09 NOTE — ED PROVIDER NOTES
Subjective   Patient is a 61 y.o. female presenting with chest pain.   History provided by:  Patient  Chest Pain   Pain location:  L chest  Pain quality: sharp    Pain radiates to:  Neck  Onset quality:  Sudden  Progression:  Resolved  Relieved by:  Nothing  Worsened by:  Nothing  Ineffective treatments:  None tried  Associated symptoms: headache, lower extremity edema and shortness of breath    Associated symptoms: no abdominal pain, no back pain, no cough, no diaphoresis, no dizziness, no fever, no nausea, no numbness, no palpitations, no syncope, no vomiting and no weakness    Risk factors: diabetes mellitus, hypertension and obesity        Review of Systems   Constitutional: Negative for chills, diaphoresis and fever.   HENT: Negative for sneezing and sore throat.    Eyes: Negative for pain and redness.   Respiratory: Positive for shortness of breath. Negative for cough and wheezing.    Cardiovascular: Positive for chest pain. Negative for palpitations and syncope.   Gastrointestinal: Negative for abdominal pain, nausea and vomiting.   Musculoskeletal: Negative for arthralgias and back pain.   Neurological: Positive for headaches. Negative for dizziness, syncope, weakness and numbness.   Psychiatric/Behavioral: Negative for agitation and confusion.       Past Medical History   Diagnosis Date   • Acute bronchitis    • Anxiety      Anxiety state      • Arthropathy of lumbar facet joint    • Asthma    • Astigmatism    • At risk for adverse drug event      Adverse drug event resulting from treatment of disorder      • Back ache    • Benign essential hypertension    • Cholecystitis      mild on ultrasound      • Chronic back pain    • Contact dermatitis    • COPD (chronic obstructive pulmonary disease)    • Cough    • Depression      Depressive disorder, not elsewhere classified      • Diabetes mellitus      no retinopathy      • Diabetes mellitus without complication      type II or unspecified type, not stated as  uncontrolled      • Diarrhea    • Drug therapy      Other long term (current) drug therapy      • Dyspnea    • Electrocardiogram abnormal    • Epigastric pain    • Episodic mood disorder      Unspecified    • Esophageal reflux    • Esophagitis      grade II   • Foot pain      VS SMALL PHALANX AVULSION      • Generalized abdominal pain    • Generalized anxiety disorder    • History of colon polyps    • Hypermetropia    • Irritable bowel syndrome    • Knee pain 03/22/2016      being evaluated for knee replacements.       • Malaise and fatigue    • Myofascial pain    • Nausea and vomiting    • Neck pain    • LJ (obstructive sleep apnea)    • Osteoarthritis    • Pain in wrist    • Polyp of intestine      large intestine   • Pure hypercholesterolemia    • Right upper quadrant pain    • Screening for hyperlipidemia    • Shoulder pain 09/02/2014     possible Rotator Cuff Tendinitis      • Spasm of back muscles    • Sprain of sacroiliac ligament    • Transient cerebral ischemia    • Type 2 diabetes mellitus    • Type II diabetes mellitus, uncontrolled    • Upper respiratory infection    • Vitamin D deficiency        No Known Allergies    Past Surgical History   Procedure Laterality Date   • Colonoscopy     • Upper gastrointestinal endoscopy     • Carpal tunnel release     • Colonoscopy w/ polypectomy  06/10/2015     Colonoscopy remove polyps 40176 (1)      • Injection of medication  09/02/2014     Drain/Inject Intermed Joint 20605 (1)      • Endoscopy  06/10/2015     EGD w/ biopsy 02379 (1)      • Tubal abdominal ligation       Tubal ligation (1)          Family History   Problem Relation Age of Onset   • Heart disease Other    • Bone cancer Other    • Lung cancer Other    • Throat cancer Other    • Cancer Other      Other   • Colon cancer Other      Colorectal Cancer   • Diabetes Other    • Hypertension Other        Social History     Social History   • Marital status:      Spouse name: N/A   • Number of children:  N/A   • Years of education: N/A     Social History Main Topics   • Smoking status: Former Smoker     Quit date: 1996   • Smokeless tobacco: Never Used      Comment: non smoker for years   • Alcohol use No   • Drug use: No   • Sexual activity: Defer     Other Topics Concern   • None     Social History Narrative   • None           Objective   Physical Exam   Constitutional: She is oriented to person, place, and time and well-developed, well-nourished, and in no distress. She appears well-developed and well-nourished.   HENT:   Head: Normocephalic and atraumatic.   Right Ear: External ear normal.   Left Ear: External ear normal.   Eyes: Conjunctivae and EOM are normal. Pupils are equal, round, and reactive to light. Right eye exhibits no discharge. Left eye exhibits no discharge.   Neck: Normal range of motion. Neck supple. No JVD present. No tracheal deviation present.   Cardiovascular: Normal rate, regular rhythm, normal heart sounds and intact distal pulses.    Pulmonary/Chest: Effort normal and breath sounds normal. No respiratory distress. She has no wheezes. She has no rales.   Abdominal: Soft. Bowel sounds are normal. She exhibits no distension. There is no tenderness.   Musculoskeletal: Normal range of motion. She exhibits no edema, tenderness or deformity.   Neurological: She is alert and oriented to person, place, and time. No cranial nerve deficit.   Skin: Skin is warm and dry.   Psychiatric: Mood and affect normal.   Nursing note and vitals reviewed.      ECG 12 Lead    Date/Time: 2/8/2017 7:30 PM  Performed by: SVEN MANUEL  Authorized by: RUTH MALONEY   Interpreted by physician  Rhythm: sinus bradycardia  Rate: bradycardic  QRS axis: normal  Clinical impression: normal ECG and low voltage               ED Course  ED Course   Comment By Time   Dr Maloney d/w Dr USKHDEEP Turcios, will admit Sven Manuel MD 02/08 2102            HEART Score  History: Highly suspicious (+2)  ECG: Normal (+0)  Age:  45 through 65 (+1)  Risk Factors: 3 or more risk factors OR history of atherosclerotic disease (+2)         MDM  Number of Diagnoses or Management Options  Chest pain, precordial:      Amount and/or Complexity of Data Reviewed  Clinical lab tests: ordered and reviewed  Tests in the radiology section of CPT®: ordered  Independent visualization of images, tracings, or specimens: yes    Risk of Complications, Morbidity, and/or Mortality  Presenting problems: moderate  Diagnostic procedures: moderate  Management options: moderate    Patient Progress  Patient progress: stable      Final diagnoses:   Chest pain, precordial     Dr Jensen is the attending physician for this patient.  He saw and examined patient with me and agrees with the assessment and plan.          This document has been electronically signed by Eulogio Maunel MD on February 8, 2017 9:04 PM        Eulogio Manuel MD  Resident  02/08/17 4417

## 2017-02-11 ENCOUNTER — HOSPITAL ENCOUNTER (EMERGENCY)
Facility: HOSPITAL | Age: 62
Discharge: STILL ADMITTED OR ADMINISTRATIVE | End: 2017-02-11

## 2017-03-07 ENCOUNTER — APPOINTMENT (OUTPATIENT)
Dept: LAB | Facility: HOSPITAL | Age: 62
End: 2017-03-07

## 2017-03-07 ENCOUNTER — OFFICE VISIT (OUTPATIENT)
Dept: PAIN MEDICINE | Facility: CLINIC | Age: 62
End: 2017-03-07

## 2017-03-07 VITALS
DIASTOLIC BLOOD PRESSURE: 64 MMHG | HEIGHT: 65 IN | SYSTOLIC BLOOD PRESSURE: 128 MMHG | BODY MASS INDEX: 45.29 KG/M2 | WEIGHT: 271.8 LBS

## 2017-03-07 DIAGNOSIS — M47.817 LUMBOSACRAL SPONDYLOSIS WITHOUT MYELOPATHY: Primary | ICD-10-CM

## 2017-03-07 DIAGNOSIS — Z79.899 HIGH RISK MEDICATIONS (NOT ANTICOAGULANTS) LONG-TERM USE: ICD-10-CM

## 2017-03-07 DIAGNOSIS — M79.18 MYOFASCIAL PAIN: ICD-10-CM

## 2017-03-07 PROCEDURE — 99214 OFFICE O/P EST MOD 30 MIN: CPT | Performed by: PAIN MEDICINE

## 2017-03-07 PROCEDURE — G0481 DRUG TEST DEF 8-14 CLASSES: HCPCS | Performed by: PAIN MEDICINE

## 2017-03-07 PROCEDURE — 80307 DRUG TEST PRSMV CHEM ANLYZR: CPT | Performed by: PAIN MEDICINE

## 2017-03-07 RX ORDER — HYDROCODONE BITARTRATE AND ACETAMINOPHEN 7.5; 325 MG/1; MG/1
TABLET ORAL
COMMUNITY
Start: 2017-02-10 | End: 2017-03-15 | Stop reason: HOSPADM

## 2017-03-07 RX ORDER — HYDROCODONE BITARTRATE AND ACETAMINOPHEN 7.5; 325 MG/1; MG/1
1 TABLET ORAL 4 TIMES DAILY
Qty: 120 TABLET | Refills: 0 | Status: SHIPPED | OUTPATIENT
Start: 2017-03-07 | End: 2017-04-06

## 2017-03-07 RX ORDER — HYDROCODONE BITARTRATE AND ACETAMINOPHEN 7.5; 325 MG/1; MG/1
1 TABLET ORAL 4 TIMES DAILY
Qty: 120 TABLET | Refills: 0 | Status: SHIPPED | OUTPATIENT
Start: 2017-03-07 | End: 2017-03-15 | Stop reason: HOSPADM

## 2017-03-07 NOTE — PROGRESS NOTES
Mila Fisher is a 61 y.o. female.   1955    HPI:   Location: neck and lower back  Quality: aching, sharp and dull  Severity: 7/10  Timing: constant  Alleviating: pain medication  Aggravating: increased activity    Patient denies side effects, she reports that her opioid medication still provides significant relief and allows her to be more active.  Weather is causing some increased pain.        The following portions of the patient's history were reviewed by me and updated as appropriate: allergies, current medications, past family history, past medical history, past social history, past surgical history and problem list.    Past Medical History   Diagnosis Date   • Acute bronchitis    • Anxiety      Anxiety state      • Arthropathy of lumbar facet joint    • Asthma    • Astigmatism    • At risk for adverse drug event      Adverse drug event resulting from treatment of disorder      • Back ache    • Benign essential hypertension    • Cholecystitis      mild on ultrasound      • Chronic back pain    • Contact dermatitis    • COPD (chronic obstructive pulmonary disease)    • Cough    • Depression      Depressive disorder, not elsewhere classified      • Diabetes mellitus      no retinopathy      • Diabetes mellitus without complication      type II or unspecified type, not stated as uncontrolled      • Diarrhea    • Drug therapy      Other long term (current) drug therapy      • Dyspnea    • Electrocardiogram abnormal    • Epigastric pain    • Episodic mood disorder      Unspecified    • Esophageal reflux    • Esophagitis      grade II   • Foot pain      VS SMALL PHALANX AVULSION      • Generalized abdominal pain    • Generalized anxiety disorder    • History of colon polyps    • Hypermetropia    • Irritable bowel syndrome    • Knee pain 03/22/2016      being evaluated for knee replacements.       • Malaise and fatigue    • Myofascial pain    • Nausea and vomiting    • Neck pain    • LJ (obstructive  sleep apnea)    • Osteoarthritis    • Pain in wrist    • Polyp of intestine      large intestine   • Pure hypercholesterolemia    • Right upper quadrant pain    • Screening for hyperlipidemia    • Shoulder pain 09/02/2014     possible Rotator Cuff Tendinitis      • Spasm of back muscles    • Sprain of sacroiliac ligament    • Transient cerebral ischemia    • Type 2 diabetes mellitus    • Type II diabetes mellitus, uncontrolled    • Upper respiratory infection    • Vitamin D deficiency        Social History     Social History   • Marital status:      Spouse name: N/A   • Number of children: N/A   • Years of education: N/A     Occupational History   • Not on file.     Social History Main Topics   • Smoking status: Former Smoker     Quit date: 1996   • Smokeless tobacco: Never Used      Comment: non smoker for years   • Alcohol use No   • Drug use: No   • Sexual activity: Defer     Other Topics Concern   • Not on file     Social History Narrative       Family History   Problem Relation Age of Onset   • Heart disease Other    • Bone cancer Other    • Lung cancer Other    • Throat cancer Other    • Cancer Other      Other   • Colon cancer Other      Colorectal Cancer   • Diabetes Other    • Hypertension Other          Current Outpatient Prescriptions:   •  albuterol (PROVENTIL HFA;VENTOLIN HFA) 108 (90 BASE) MCG/ACT inhaler, Inhale 2 puffs Every 4 (Four) Hours As Needed for wheezing., Disp: , Rfl:   •  albuterol (PROVENTIL) (2.5 MG/3ML) 0.083% nebulizer solution, Take 2.5 mg by nebulization Every 4 (Four) Hours As Needed for wheezing., Disp: , Rfl:   •  baclofen (LIORESAL) 10 MG tablet, Take 10 mg by mouth 3 (Three) Times a Day., Disp: , Rfl:   •  carvedilol (COREG) 6.25 MG tablet, Take 6.25 mg by mouth 2 (Two) Times a Day With Meals., Disp: , Rfl:   •  clopidogrel (PLAVIX) 75 MG tablet, Take 75 mg by mouth Daily., Disp: , Rfl:   •  furosemide (LASIX) 80 MG tablet, Take 80 mg by mouth 2 (Two) Times a Day., Disp:  , Rfl:   •  hydrOXYzine (VISTARIL) 50 MG capsule, Take 50 mg by mouth 3 (Three) Times a Day As Needed for itching., Disp: , Rfl:   •  insulin aspart protamine-insulin aspart (novoLOG 70/30) (70-30) 100 UNIT/ML injection, Inject  under the skin 2 (Two) Times a Day With Meals., Disp: , Rfl:   •  isosorbide mononitrate (IMDUR) 30 MG 24 hr tablet, Take 30 mg by mouth Daily., Disp: , Rfl:   •  lisinopril (PRINIVIL,ZESTRIL) 5 MG tablet, Take 5 mg by mouth Daily., Disp: , Rfl:   •  losartan (COZAAR) 50 MG tablet, Take 50 mg by mouth Daily., Disp: , Rfl:   •  magnesium oxide (MAGOX) 400 (241.3 MG) MG tablet tablet, Take 400 mg by mouth Daily., Disp: , Rfl:   •  meloxicam (MOBIC) 15 MG tablet, Take 15 mg by mouth Daily., Disp: , Rfl:   •  metFORMIN (GLUCOPHAGE) 1000 MG tablet, Take 1,000 mg by mouth 2 (Two) Times a Day With Meals., Disp: , Rfl:   •  metoprolol succinate XL (TOPROL-XL) 25 MG 24 hr tablet, Take 25 mg by mouth Daily., Disp: , Rfl:   •  pantoprazole (PROTONIX) 40 MG EC tablet, Take 40 mg by mouth Daily., Disp: , Rfl:   •  pravastatin (PRAVACHOL) 40 MG tablet, Take 40 mg by mouth Daily., Disp: , Rfl:   •  spironolactone (ALDACTONE) 25 MG tablet, , Disp: , Rfl: 11  •  vitamin D (ERGOCALCIFEROL) 81028 UNITS capsule capsule, Take 50,000 Units by mouth 1 (One) Time Per Week., Disp: , Rfl:   •  dicyclomine (BENTYL) 10 MG capsule, Take 10 mg by mouth 4 (Four) Times a Day Before Meals & at Bedtime., Disp: , Rfl:   •  dicyclomine (BENTYL) 20 MG tablet, TAKE 1 TABLET BY MOUTH EVERY 6 HOURS, Disp: 60 tablet, Rfl: 0  •  HYDROcodone-acetaminophen (NORCO) 7.5-325 MG per tablet, , Disp: , Rfl:   •  HYDROcodone-acetaminophen (NORCO) 7.5-325 MG per tablet, Take 1 tablet by mouth 4 (Four) Times a Day for 30 days., Disp: 120 tablet, Rfl: 0  •  HYDROcodone-acetaminophen (NORCO) 7.5-325 MG per tablet, Take 1 tablet by mouth 4 (Four) Times a Day for 30 days., Disp: 120 tablet, Rfl: 0    No Known Allergies      Review of Systems    Musculoskeletal: Positive for back pain (lower) and neck pain.     10 system review of systems was reviewed and negative except for above.    Physical Exam   Constitutional: She appears well-developed and well-nourished. No distress.   Musculoskeletal:        Lumbar back: She exhibits decreased range of motion (10 deg ext with facet loading.  45 deg of flexion with pain) and tenderness.   Neurological: She is alert. Gait (antalgic) abnormal.   Psychiatric: She has a normal mood and affect. Her behavior is normal. Judgment normal.       Mila was seen today for neck pain and back pain.    Diagnoses and all orders for this visit:    Lumbosacral spondylosis without myelopathy  -     ToxASSURE Select 13 (MW)    Myofascial pain  -     ToxASSURE Select 13 (MW)    High risk medications (not anticoagulants) long-term use  -     ToxASSURE Select 13 (MW)    Other orders  -     HYDROcodone-acetaminophen (NORCO) 7.5-325 MG per tablet; Take 1 tablet by mouth 4 (Four) Times a Day for 30 days.  -     HYDROcodone-acetaminophen (NORCO) 7.5-325 MG per tablet; Take 1 tablet by mouth 4 (Four) Times a Day for 30 days.        Medication: Patient reports no negative side effects, Patient reports appropriate usage and storage habits, Patient's opioid provides enough reflief to be more active and perform activities of daily living with less discomfort. and Refill opioid medication as above    Interventional: none at this time.  Chronic plavix and scared of needles.    Rehab: none at this time    Behavioral: No aberrant behavior noted. [facility] Report #[id number]  was reviewed and is consistent with stated history    Urine drug screen Ordered today to test for drugs of abuse and prescribed medications          This document has been electronically signed by Bryce Edmonds MD on March 7, 2017 10:32 AM

## 2017-03-12 LAB
CONV REPORT SUMMARY: NORMAL
Lab: NORMAL

## 2017-03-15 ENCOUNTER — OFFICE VISIT (OUTPATIENT)
Dept: GASTROENTEROLOGY | Facility: CLINIC | Age: 62
End: 2017-03-15

## 2017-03-15 VITALS
SYSTOLIC BLOOD PRESSURE: 82 MMHG | HEIGHT: 65 IN | BODY MASS INDEX: 45.67 KG/M2 | HEART RATE: 64 BPM | WEIGHT: 274.1 LBS | DIASTOLIC BLOOD PRESSURE: 41 MMHG

## 2017-03-15 DIAGNOSIS — K21.00 GASTROESOPHAGEAL REFLUX DISEASE WITH ESOPHAGITIS: ICD-10-CM

## 2017-03-15 DIAGNOSIS — K58.0 IRRITABLE BOWEL SYNDROME WITH DIARRHEA: ICD-10-CM

## 2017-03-15 DIAGNOSIS — R10.84 GENERALIZED ABDOMINAL PAIN: ICD-10-CM

## 2017-03-15 DIAGNOSIS — R11.0 NAUSEA: Primary | ICD-10-CM

## 2017-03-15 PROCEDURE — 99213 OFFICE O/P EST LOW 20 MIN: CPT | Performed by: PHYSICIAN ASSISTANT

## 2017-03-15 RX ORDER — BLOOD SUGAR DIAGNOSTIC
STRIP MISCELLANEOUS
COMMUNITY

## 2017-03-15 RX ORDER — DICYCLOMINE HCL 20 MG
20 TABLET ORAL EVERY 6 HOURS
Qty: 90 TABLET | Refills: 5 | Status: SHIPPED | OUTPATIENT
Start: 2017-03-15 | End: 2018-03-16

## 2017-03-15 RX ORDER — PANTOPRAZOLE SODIUM 40 MG/1
40 TABLET, DELAYED RELEASE ORAL DAILY
Qty: 30 TABLET | Refills: 5 | Status: SHIPPED | OUTPATIENT
Start: 2017-03-15 | End: 2018-03-27 | Stop reason: SDUPTHER

## 2017-03-15 RX ORDER — FUROSEMIDE 40 MG/1
40 TABLET ORAL DAILY
COMMUNITY

## 2017-03-15 NOTE — PROGRESS NOTES
Chief Complaint   Patient presents with   • Abdominal Pain   • Diarrhea   • Heartburn       ENDO PROCEDURE ORDERED:    Subjective    Mila Fisher is a 62 y.o. female. she is here today for follow-up.    History of Present Illness    Patient seen on a recheck of her GERD, abdominal pain, diarrhea.  Last seen 8/31/16.  Patient was given Bentyl and Protonix.  However she did not return for follow-up.  She states she's been sick.  As long as she's been taking the medication she's been doing much better.  She denies heartburn, nausea vomiting, bowels are moving without blood.  Weight is down 14 pounds since last visit.  Last EGD/colonoscopy on 6/10/15 showed esophagitis, gastritis, hemorrhoids as well as colon polyp.    Patient was hospitalized with chest pain 2/8-9/17.  Cardiac enzymes were normal.  CBC showed low platelets of 126,000.  Normal d-dimer.  BNP, chest x-ray were normal.  CMP showed glucose 181, BUN 23, creatinine 1.02, protein 6.2, otherwise normal.    A/P: Chronic GERD, IBS-D doing well on current regimen.  Refilled her Protonix and Bentyl.  She is encouraged continue dietary modification and weight loss.  Suspect some degree of Floyd.  We'll follow-up in 6 months, sooner if needed.     The following portions of the patient's history were reviewed and updated as appropriate:   Past Medical History:   Diagnosis Date   • Acute bronchitis    • Anxiety     Anxiety state      • Arthropathy of lumbar facet joint    • Asthma    • Astigmatism    • At risk for adverse drug event     Adverse drug event resulting from treatment of disorder      • Back ache    • Benign essential hypertension    • Cholecystitis     mild on ultrasound      • Chronic back pain    • Contact dermatitis    • COPD (chronic obstructive pulmonary disease)    • Cough    • Depression     Depressive disorder, not elsewhere classified      • Diabetes mellitus     no retinopathy      • Diabetes mellitus without complication     type II or  unspecified type, not stated as uncontrolled      • Diarrhea    • Drug therapy     Other long term (current) drug therapy      • Dyspnea    • Electrocardiogram abnormal    • Epigastric pain    • Episodic mood disorder     Unspecified    • Esophageal reflux    • Esophagitis     grade II   • Foot pain     VS SMALL PHALANX AVULSION      • Generalized abdominal pain    • Generalized anxiety disorder    • History of colon polyps    • Hypermetropia    • Irritable bowel syndrome    • Knee pain 03/22/2016     being evaluated for knee replacements.       • Malaise and fatigue    • Myofascial pain    • Nausea and vomiting    • Neck pain    • LJ (obstructive sleep apnea)    • Osteoarthritis    • Pain in wrist    • Polyp of intestine     large intestine   • Pure hypercholesterolemia    • Right upper quadrant pain    • Screening for hyperlipidemia    • Shoulder pain 09/02/2014    possible Rotator Cuff Tendinitis      • Spasm of back muscles    • Sprain of sacroiliac ligament    • Transient cerebral ischemia    • Type 2 diabetes mellitus    • Type II diabetes mellitus, uncontrolled    • Upper respiratory infection    • Vitamin D deficiency      Past Surgical History:   Procedure Laterality Date   • CARPAL TUNNEL RELEASE     • COLONOSCOPY     • COLONOSCOPY W/ POLYPECTOMY  06/10/2015    Colonoscopy remove polyps 32684 (1)      • ENDOSCOPY  06/10/2015    EGD w/ biopsy 46012 (1)      • INJECTION OF MEDICATION  09/02/2014    Drain/Inject Intermed Joint 81117 (1)      • TUBAL ABDOMINAL LIGATION      Tubal ligation (1)      • UPPER GASTROINTESTINAL ENDOSCOPY       Family History   Problem Relation Age of Onset   • Heart disease Other    • Bone cancer Other    • Lung cancer Other    • Throat cancer Other    • Cancer Other      Other   • Colon cancer Other      Colorectal Cancer   • Diabetes Other    • Hypertension Other      OB History     No data available        No Known Allergies  Social History     Social History   • Marital status:  "     Spouse name: N/A   • Number of children: N/A   • Years of education: N/A     Social History Main Topics   • Smoking status: Former Smoker     Quit date: 1996   • Smokeless tobacco: Never Used      Comment: non smoker for years   • Alcohol use No   • Drug use: No   • Sexual activity: Defer     Other Topics Concern   • None     Social History Narrative       Current Outpatient Prescriptions:   •  albuterol (PROVENTIL HFA;VENTOLIN HFA) 108 (90 BASE) MCG/ACT inhaler, Inhale 2 puffs Every 4 (Four) Hours As Needed for wheezing., Disp: , Rfl:   •  albuterol (PROVENTIL) (2.5 MG/3ML) 0.083% nebulizer solution, Take 2.5 mg by nebulization Every 4 (Four) Hours As Needed for wheezing., Disp: , Rfl:   •  carvedilol (COREG) 6.25 MG tablet, Take 6.25 mg by mouth 2 (Two) Times a Day With Meals., Disp: , Rfl:   •  clopidogrel (PLAVIX) 75 MG tablet, Take 75 mg by mouth Daily., Disp: , Rfl:   •  furosemide (LASIX) 40 MG tablet, Take 40 mg by mouth Daily., Disp: , Rfl:   •  glucose blood (FREESTYLE LITE) test strip, 1 each by Other route As Needed. Use as instructed, Disp: , Rfl:   •  HYDROcodone-acetaminophen (NORCO) 7.5-325 MG per tablet, Take 1 tablet by mouth 4 (Four) Times a Day for 30 days., Disp: 120 tablet, Rfl: 0  •  insulin aspart protamine-insulin aspart (novoLOG 70/30) (70-30) 100 UNIT/ML injection, Inject  under the skin 2 (Two) Times a Day With Meals., Disp: , Rfl:   •  Insulin Syringe-Needle U-100 (GNP INSULIN SYRINGE) 31G X 5/16\" 0.3 ML misc, , Disp: , Rfl:   •  isosorbide mononitrate (IMDUR) 30 MG 24 hr tablet, Take 30 mg by mouth Daily., Disp: , Rfl:   •  lisinopril (PRINIVIL,ZESTRIL) 5 MG tablet, Take 5 mg by mouth Daily., Disp: , Rfl:   •  magnesium oxide (MAGOX) 400 (241.3 MG) MG tablet tablet, Take 400 mg by mouth Daily., Disp: , Rfl:   •  meloxicam (MOBIC) 15 MG tablet, Take 15 mg by mouth Daily., Disp: , Rfl:   •  metFORMIN (GLUCOPHAGE) 1000 MG tablet, Take 1,000 mg by mouth 2 (Two) Times a Day With " "Meals., Disp: , Rfl:   •  metoprolol succinate XL (TOPROL-XL) 25 MG 24 hr tablet, Take 25 mg by mouth Daily., Disp: , Rfl:   •  pantoprazole (PROTONIX) 40 MG EC tablet, Take 1 tablet by mouth Daily., Disp: 30 tablet, Rfl: 5  •  pravastatin (PRAVACHOL) 40 MG tablet, Take 40 mg by mouth Daily., Disp: , Rfl:   •  spironolactone (ALDACTONE) 25 MG tablet, , Disp: , Rfl: 11  •  vitamin D (ERGOCALCIFEROL) 34969 UNITS capsule capsule, Take 50,000 Units by mouth 1 (One) Time Per Week., Disp: , Rfl:   •  dicyclomine (BENTYL) 20 MG tablet, Take 1 tablet by mouth Every 6 (Six) Hours., Disp: 90 tablet, Rfl: 5  Review of Systems  Review of Systems       Objective      BP (!) 82/41 (BP Location: Left arm, Patient Position: Sitting, Cuff Size: Adult)  Pulse 64  Ht 65\" (165.1 cm)  Wt 274 lb 1.6 oz (124 kg)  BMI 45.61 kg/m2  Physical Exam   Constitutional: She is oriented to person, place, and time. She appears well-developed and well-nourished. No distress.   Chronically ill   HENT:   Head: Normocephalic and atraumatic.   Eyes: EOM are normal. Pupils are equal, round, and reactive to light.   Neck: Normal range of motion.   Cardiovascular: Normal rate, regular rhythm and normal heart sounds.    Pulmonary/Chest: Effort normal and breath sounds normal.   Abdominal: Soft. Bowel sounds are normal. She exhibits no shifting dullness, no distension, no abdominal bruit, no ascites and no mass. There is no hepatosplenomegaly. There is tenderness. There is no rigidity, no rebound, no guarding and no CVA tenderness. A hernia is present. Hernia confirmed negative in the ventral area.   Obese, mild diffuse. Moderate/large ventral diastasis   Musculoskeletal: Normal range of motion.   Neurological: She is alert and oriented to person, place, and time.   Skin: Skin is warm and dry.   Psychiatric: She has a normal mood and affect. Her behavior is normal. Judgment and thought content normal.   Nursing note and vitals reviewed.    Office Visit on " 03/07/2017   Component Date Value Ref Range Status   • Report Summary 03/07/2017 FINAL   Final    Comment: ====================================================================  [employer] SELECT 13 (MW)  ====================================================================  Test                             Result       Flag       Units  Drug Present    Hydrocodone                    543                     ng/mg creat    Hydromorphone                  114                     ng/mg creat    Dihydrocodeine                 46                      ng/mg creat    Norhydrocodone                 1769                    ng/mg creat     Sources of hydrocodone include scheduled prescription     medications. Hydromorphone, dihydrocodeine and norhydrocodone are     expected metabolites of hydrocodone. Hydromorphone and     dihydrocodeine are also available as scheduled prescription     medications.  ====================================================================  Test                      Result    Flag   Units      Ref Range    Creatinine              239              mg/dL                                 >=20  ====================================================================  Declared Medications:   Medication list was not provided.  ====================================================================  For clinical consultation, please call [phone].  ====================================================================   • PDF Image 03/07/2017 .   Final     Assessment/Plan      1. Nausea    2. Generalized abdominal pain    3. Irritable bowel syndrome with diarrhea    4. Gastroesophageal reflux disease with esophagitis    .   Mila was seen today for abdominal pain, diarrhea and heartburn.    Diagnoses and all orders for this visit:    Nausea  -     pantoprazole (PROTONIX) 40 MG EC tablet; Take 1 tablet by mouth Daily.    Generalized abdominal pain  -     dicyclomine (BENTYL) 20 MG tablet; Take 1 tablet by mouth  Every 6 (Six) Hours.    Irritable bowel syndrome with diarrhea  -     dicyclomine (BENTYL) 20 MG tablet; Take 1 tablet by mouth Every 6 (Six) Hours.    Gastroesophageal reflux disease with esophagitis  -     pantoprazole (PROTONIX) 40 MG EC tablet; Take 1 tablet by mouth Daily.        Orders placed during this encounter include:  No orders of the defined types were placed in this encounter.      Medications prescribed:  New Medications Ordered This Visit   Medications   • pantoprazole (PROTONIX) 40 MG EC tablet     Sig: Take 1 tablet by mouth Daily.     Dispense:  30 tablet     Refill:  5   • dicyclomine (BENTYL) 20 MG tablet     Sig: Take 1 tablet by mouth Every 6 (Six) Hours.     Dispense:  90 tablet     Refill:  5     Discontinued Medications       Reason for Discontinue    furosemide (LASIX) 80 MG tablet Dose adjustment    baclofen (LIORESAL) 10 MG tablet Patient Discharge    dicyclomine (BENTYL) 10 MG capsule Patient Discharge    dicyclomine (BENTYL) 20 MG tablet Patient Discharge    HYDROcodone-acetaminophen (NORCO) 7.5-325 MG per tablet Patient Discharge    HYDROcodone-acetaminophen (NORCO) 7.5-325 MG per tablet Patient Discharge    hydrOXYzine (VISTARIL) 50 MG capsule Patient Discharge    losartan (COZAAR) 50 MG tablet Patient Discharge        Requested Prescriptions     Signed Prescriptions Disp Refills   • pantoprazole (PROTONIX) 40 MG EC tablet 30 tablet 5     Sig: Take 1 tablet by mouth Daily.   • dicyclomine (BENTYL) 20 MG tablet 90 tablet 5     Sig: Take 1 tablet by mouth Every 6 (Six) Hours.       Review and/or summary of lab tests, radiology, procedures, medications. Review and summary of old records and obtaining of history. The risks and benefits of my recommendations, as well as other treatment options were discussed with the patient today. Questions were answered.    Follow-up: Return in about 6 months (around 9/15/2017), or if symptoms worsen or fail to improve.     * Surgery not found  *      This document has been electronically signed by Jarett Mcdonough PA-C on March 27, 2017 12:11 PM      Results for orders placed or performed in visit on 03/07/17   [employer] Select 13 (MW)   Result Value Ref Range    Report Summary FINAL     PDF Image .    Results for orders placed or performed during the hospital encounter of 02/08/17   Gold Top - SST   Result Value Ref Range    Extra Tube Hold for add-ons.    Green Top (Gel)   Result Value Ref Range    Extra Tube Hold for add-ons.    CK-MB   Result Value Ref Range    CKMB 0.68 0.00 - 5.00 ng/mL   CBC Auto Differential   Result Value Ref Range    WBC 4.83 3.20 - 9.80 10*3/mm3    RBC 4.41 3.77 - 5.16 10*6/mm3    Hemoglobin 12.1 12.0 - 15.5 g/dL    Hematocrit 36.7 35.0 - 45.0 %    MCV 83.2 80.0 - 98.0 fL    MCH 27.4 26.5 - 34.0 pg    MCHC 33.0 31.4 - 36.0 g/dL    RDW 13.3 11.5 - 14.5 %    RDW-SD 40.0 36.4 - 46.3 fl    MPV 10.8 8.0 - 12.0 fL    Platelets 126 (L) 150 - 450 10*3/mm3    Neutrophil % 49.3 37.0 - 80.0 %    Lymphocyte % 42.2 10.0 - 50.0 %    Monocyte % 5.8 0.0 - 12.0 %    Eosinophil % 2.1 0.0 - 7.0 %    Basophil % 0.4 0.0 - 2.0 %    Immature Grans % 0.2 0.0 - 0.5 %    Neutrophils, Absolute 2.38 2.00 - 8.60 10*3/mm3    Lymphocytes, Absolute 2.04 0.60 - 4.20 10*3/mm3    Monocytes, Absolute 0.28 0.00 - 0.90 10*3/mm3    Eosinophils, Absolute 0.10 0.00 - 0.70 10*3/mm3    Basophils, Absolute 0.02 0.00 - 0.20 10*3/mm3    Immature Grans, Absolute 0.01 0.00 - 0.02 10*3/mm3   CBC Auto Differential   Result Value Ref Range    WBC 6.83 3.20 - 9.80 10*3/mm3    RBC 3.77 3.77 - 5.16 10*6/mm3    Hemoglobin 10.6 (L) 12.0 - 15.5 g/dL    Hematocrit 30.9 (L) 35.0 - 45.0 %    MCV 82.0 80.0 - 98.0 fL    MCH 28.1 26.5 - 34.0 pg    MCHC 34.3 31.4 - 36.0 g/dL    RDW 14.3 11.5 - 14.5 %    RDW-SD 42.8 36.4 - 46.3 fl    MPV 11.2 8.0 - 12.0 fL    Platelets 166 150 - 450 10*3/mm3    Neutrophil % 50.1 37.0 - 80.0 %    Lymphocyte % 40.7 10.0 - 50.0 %    Monocyte % 6.3 0.0 - 12.0 %     Eosinophil % 2.3 0.0 - 7.0 %    Basophil % 0.3 0.0 - 2.0 %    Immature Grans % 0.3 0.0 - 0.5 %    Neutrophils, Absolute 3.42 2.00 - 8.60 10*3/mm3    Lymphocytes, Absolute 2.78 0.60 - 4.20 10*3/mm3    Monocytes, Absolute 0.43 0.00 - 0.90 10*3/mm3    Eosinophils, Absolute 0.16 0.00 - 0.70 10*3/mm3    Basophils, Absolute 0.02 0.00 - 0.20 10*3/mm3    Immature Grans, Absolute 0.02 0.00 - 0.02 10*3/mm3   Lavender Top   Result Value Ref Range    Extra Tube hold for add-on    Light Blue Top   Result Value Ref Range    Extra Tube hold for add-on    Troponin   Result Value Ref Range    Troponin I <0.012 <=0.034 ng/mL   Troponin   Result Value Ref Range    Troponin I <0.012 <=0.034 ng/mL   D-dimer, Quantitative   Result Value Ref Range    D-Dimer, Quantitative <270 0 - 470 ng/mL (FEU)   D-dimer, Quantitative   Result Value Ref Range    D-Dimer, Quantitative <270 0 - 470 ng/mL (FEU)   POC Glucose Fingerstick   Result Value Ref Range    Glucose 275 (H) 70 - 130 mg/dL   POC Glucose Fingerstick   Result Value Ref Range    Glucose 192 (H) 70 - 130 mg/dL   POC Glucose Fingerstick   Result Value Ref Range    Glucose 153 (H) 70 - 130 mg/dL   BNP   Result Value Ref Range    proBNP 284.0 0.0 - 900.0 pg/mL   CK   Result Value Ref Range    Creatine Kinase 75 30 - 135 U/L   Comprehensive Metabolic Panel   Result Value Ref Range    Glucose 181 (H) 60 - 100 mg/dL    BUN 23 (H) 7 - 21 mg/dL    Creatinine 1.02 (H) 0.50 - 1.00 mg/dL    Sodium 137 137 - 145 mmol/L    Potassium 3.9 3.5 - 5.1 mmol/L    Chloride 103 95 - 110 mmol/L    CO2 27.0 22.0 - 31.0 mmol/L    Calcium 9.1 8.4 - 10.2 mg/dL    Total Protein 6.2 (L) 6.3 - 8.6 g/dL    Albumin 3.60 3.40 - 4.80 g/dL    ALT (SGPT) 16 9 - 52 U/L    AST (SGOT) 15 14 - 36 U/L    Alkaline Phosphatase 48 38 - 126 U/L    Total Bilirubin 0.3 0.2 - 1.3 mg/dL    eGFR Non  Amer 55 45 - 104 mL/min/1.73    Globulin 2.6 2.3 - 3.5 gm/dL    A/G Ratio 1.4 1.1 - 1.8 g/dL    BUN/Creatinine Ratio 22.5 7.0 -  25.0    Anion Gap 7.0 5.0 - 15.0 mmol/L   Comprehensive Metabolic Panel   Result Value Ref Range    Glucose 192 (H) 60 - 100 mg/dL    BUN 23 (H) 7 - 21 mg/dL    Creatinine 1.06 (H) 0.50 - 1.00 mg/dL    Sodium 140 137 - 145 mmol/L    Potassium 3.9 3.5 - 5.1 mmol/L    Chloride 103 95 - 110 mmol/L    CO2 28.0 22.0 - 31.0 mmol/L    Calcium 9.4 8.4 - 10.2 mg/dL    Total Protein 6.6 6.3 - 8.6 g/dL    Albumin 3.90 3.40 - 4.80 g/dL    ALT (SGPT) 26 9 - 52 U/L    AST (SGOT) 27 14 - 36 U/L    Alkaline Phosphatase 50 38 - 126 U/L    Total Bilirubin 0.3 0.2 - 1.3 mg/dL    eGFR Non  Amer 52 45 - 104 mL/min/1.73    Globulin 2.7 2.3 - 3.5 gm/dL    A/G Ratio 1.4 1.1 - 1.8 g/dL    BUN/Creatinine Ratio 21.7 7.0 - 25.0    Anion Gap 9.0 5.0 - 15.0 mmol/L     *Note: Due to a large number of results and/or encounters for the requested time period, some results have not been displayed. A complete set of results can be found in Results Review.       Some portions of this note have been dictated using voice recognition software and may contain errors and/or omissions.

## 2017-05-02 ENCOUNTER — OFFICE VISIT (OUTPATIENT)
Dept: PAIN MEDICINE | Facility: CLINIC | Age: 62
End: 2017-05-02

## 2017-05-02 VITALS
DIASTOLIC BLOOD PRESSURE: 78 MMHG | HEIGHT: 63 IN | WEIGHT: 274.8 LBS | SYSTOLIC BLOOD PRESSURE: 100 MMHG | BODY MASS INDEX: 48.69 KG/M2

## 2017-05-02 DIAGNOSIS — M79.18 MYOFASCIAL PAIN: ICD-10-CM

## 2017-05-02 DIAGNOSIS — M47.817 LUMBOSACRAL SPONDYLOSIS WITHOUT MYELOPATHY: Primary | ICD-10-CM

## 2017-05-02 DIAGNOSIS — Z79.899 HIGH RISK MEDICATIONS (NOT ANTICOAGULANTS) LONG-TERM USE: ICD-10-CM

## 2017-05-02 PROCEDURE — 99214 OFFICE O/P EST MOD 30 MIN: CPT | Performed by: PAIN MEDICINE

## 2017-05-02 RX ORDER — MELOXICAM 15 MG/1
15 TABLET ORAL
COMMUNITY
Start: 2016-04-12 | End: 2017-08-29

## 2017-05-02 RX ORDER — FUROSEMIDE 40 MG/1
40 TABLET ORAL
COMMUNITY
Start: 2016-07-20 | End: 2017-08-29

## 2017-05-02 RX ORDER — ISOSORBIDE MONONITRATE 30 MG/1
30 TABLET, EXTENDED RELEASE ORAL
COMMUNITY
Start: 2016-04-12 | End: 2017-08-29

## 2017-05-02 RX ORDER — INSULIN ASPART 100 [IU]/ML
INJECTION, SUSPENSION SUBCUTANEOUS
COMMUNITY
Start: 2016-11-11 | End: 2018-01-22

## 2017-05-02 RX ORDER — BACLOFEN 10 MG/1
10 TABLET ORAL
COMMUNITY
End: 2018-03-16

## 2017-05-02 RX ORDER — SPIRONOLACTONE 25 MG/1
25 TABLET ORAL
COMMUNITY
Start: 2016-10-31 | End: 2017-08-29

## 2017-05-02 RX ORDER — ALBUTEROL SULFATE 90 UG/1
2 AEROSOL, METERED RESPIRATORY (INHALATION) EVERY 6 HOURS
COMMUNITY
Start: 2016-04-12 | End: 2017-08-29

## 2017-05-02 RX ORDER — LANCETS
EACH MISCELLANEOUS
COMMUNITY
Start: 2015-06-16

## 2017-05-02 RX ORDER — PANTOPRAZOLE SODIUM 40 MG/1
TABLET, DELAYED RELEASE ORAL
COMMUNITY
Start: 2017-03-13 | End: 2017-08-29

## 2017-05-02 RX ORDER — PRAVASTATIN SODIUM 40 MG
40 TABLET ORAL
COMMUNITY
Start: 2016-04-12 | End: 2017-08-29

## 2017-05-02 RX ORDER — SYRINGE-NEEDLE,INSULIN,0.5 ML 31 GX5/16"
SYRINGE, EMPTY DISPOSABLE MISCELLANEOUS
COMMUNITY
Start: 2017-04-11 | End: 2021-03-11

## 2017-05-02 RX ORDER — HYDROCODONE BITARTRATE AND ACETAMINOPHEN 7.5; 325 MG/1; MG/1
1 TABLET ORAL EVERY 6 HOURS PRN
COMMUNITY
Start: 2017-04-11 | End: 2021-03-11 | Stop reason: ALTCHOICE

## 2017-05-02 RX ORDER — MAGNESIUM OXIDE 400 MG/1
400 TABLET ORAL
COMMUNITY
Start: 2016-04-12 | End: 2017-08-29

## 2017-05-02 RX ORDER — CARVEDILOL 6.25 MG/1
TABLET ORAL
COMMUNITY
Start: 2017-03-13 | End: 2017-08-29

## 2017-05-02 RX ORDER — HYDROCODONE BITARTRATE AND ACETAMINOPHEN 7.5; 325 MG/1; MG/1
1 TABLET ORAL 4 TIMES DAILY
Qty: 120 TABLET | Refills: 0 | Status: SHIPPED | OUTPATIENT
Start: 2017-05-02 | End: 2017-06-01

## 2017-05-02 RX ORDER — LISINOPRIL 5 MG/1
5 TABLET ORAL
COMMUNITY
Start: 2016-04-12 | End: 2017-08-29

## 2017-05-02 RX ORDER — CLOPIDOGREL BISULFATE 75 MG/1
75 TABLET ORAL
COMMUNITY
Start: 2016-04-12 | End: 2017-08-29

## 2017-06-26 ENCOUNTER — OFFICE VISIT (OUTPATIENT)
Dept: PAIN MEDICINE | Facility: CLINIC | Age: 62
End: 2017-06-26

## 2017-06-26 VITALS
WEIGHT: 281.9 LBS | BODY MASS INDEX: 46.97 KG/M2 | DIASTOLIC BLOOD PRESSURE: 78 MMHG | HEIGHT: 65 IN | SYSTOLIC BLOOD PRESSURE: 128 MMHG

## 2017-06-26 DIAGNOSIS — M54.2 CERVICALGIA: ICD-10-CM

## 2017-06-26 DIAGNOSIS — G89.29 CHRONIC LOW BACK PAIN, UNSPECIFIED BACK PAIN LATERALITY, WITH SCIATICA PRESENCE UNSPECIFIED: Primary | ICD-10-CM

## 2017-06-26 DIAGNOSIS — Z79.899 HIGH RISK MEDICATIONS (NOT ANTICOAGULANTS) LONG-TERM USE: ICD-10-CM

## 2017-06-26 DIAGNOSIS — M79.18 MYOFACIAL MUSCLE PAIN: ICD-10-CM

## 2017-06-26 DIAGNOSIS — M47.817 LUMBOSACRAL SPONDYLOSIS WITHOUT MYELOPATHY: ICD-10-CM

## 2017-06-26 DIAGNOSIS — E66.3 OVERWEIGHT: ICD-10-CM

## 2017-06-26 DIAGNOSIS — M54.5 CHRONIC LOW BACK PAIN, UNSPECIFIED BACK PAIN LATERALITY, WITH SCIATICA PRESENCE UNSPECIFIED: Primary | ICD-10-CM

## 2017-06-26 PROCEDURE — 99213 OFFICE O/P EST LOW 20 MIN: CPT | Performed by: PAIN MEDICINE

## 2017-06-26 NOTE — PROGRESS NOTES
Mila Fisher is a 62 y.o. female.   1955    HPI:   Location: neck and lower back  Quality: aching, sharp and dull  Severity: 4/10  Timing: constant  Alleviating: pain medication  Aggravating: increased activity    Pt with chronic neck and lower back pain. Currently 281, primary goal as well. Opiate medications provides enough relief for daily activity and ambulation. NO SE. Pt happy with pain management visit and regimen.     The following portions of the patient's history were reviewed by me and updated as appropriate: allergies, current medications, past family history, past medical history, past social history, past surgical history and problem list.    Past Medical History:   Diagnosis Date   • Acute bronchitis    • Anxiety     Anxiety state      • Arthropathy of lumbar facet joint    • Asthma    • Astigmatism    • At risk for adverse drug event     Adverse drug event resulting from treatment of disorder      • Back ache    • Benign essential hypertension    • Cholecystitis     mild on ultrasound      • Chronic back pain    • Contact dermatitis    • COPD (chronic obstructive pulmonary disease)    • Cough    • Depression     Depressive disorder, not elsewhere classified      • Diabetes mellitus     no retinopathy      • Diabetes mellitus without complication     type II or unspecified type, not stated as uncontrolled      • Diarrhea    • Drug therapy     Other long term (current) drug therapy      • Dyspnea    • Electrocardiogram abnormal    • Epigastric pain    • Episodic mood disorder     Unspecified    • Esophageal reflux    • Esophagitis     grade II   • Foot pain     VS SMALL PHALANX AVULSION      • Generalized abdominal pain    • Generalized anxiety disorder    • History of colon polyps    • Hypermetropia    • Irritable bowel syndrome    • Knee pain 03/22/2016     being evaluated for knee replacements.       • Malaise and fatigue    • Myofascial pain    • Nausea and vomiting    • Neck pain     • LJ (obstructive sleep apnea)    • Osteoarthritis    • Pain in wrist    • Polyp of intestine     large intestine   • Pure hypercholesterolemia    • Right upper quadrant pain    • Screening for hyperlipidemia    • Shoulder pain 09/02/2014    possible Rotator Cuff Tendinitis      • Spasm of back muscles    • Sprain of sacroiliac ligament    • Transient cerebral ischemia    • Type 2 diabetes mellitus    • Type II diabetes mellitus, uncontrolled    • Upper respiratory infection    • Vitamin D deficiency        Social History     Social History   • Marital status:      Spouse name: N/A   • Number of children: N/A   • Years of education: N/A     Occupational History   • Not on file.     Social History Main Topics   • Smoking status: Former Smoker     Quit date: 1996   • Smokeless tobacco: Never Used      Comment: non smoker for years   • Alcohol use No   • Drug use: No   • Sexual activity: Defer     Other Topics Concern   • Not on file     Social History Narrative       Family History   Problem Relation Age of Onset   • Heart disease Other    • Bone cancer Other    • Lung cancer Other    • Throat cancer Other    • Cancer Other      Other   • Colon cancer Other      Colorectal Cancer   • Diabetes Other    • Hypertension Other          Current Outpatient Prescriptions:   •  albuterol (PROVENTIL HFA;VENTOLIN HFA) 108 (90 BASE) MCG/ACT inhaler, Inhale 2 puffs Every 4 (Four) Hours As Needed for wheezing., Disp: , Rfl:   •  albuterol (PROVENTIL HFA;VENTOLIN HFA) 108 (90 BASE) MCG/ACT inhaler, Inhale 2 puffs Every 6 (Six) Hours., Disp: , Rfl:   •  albuterol (PROVENTIL) (2.5 MG/3ML) 0.083% nebulizer solution, Take 2.5 mg by nebulization Every 4 (Four) Hours As Needed for wheezing., Disp: , Rfl:   •  baclofen (LIORESAL) 10 MG tablet, Take 10 mg by mouth., Disp: , Rfl:   •  carvedilol (COREG) 6.25 MG tablet, Take 6.25 mg by mouth 2 (Two) Times a Day With Meals., Disp: , Rfl:   •  carvedilol (COREG) 6.25 MG tablet, TAKE  "ONE TABLET BY MOUTH TWO TIMES DAILY WITH MEALS, Disp: , Rfl:   •  clopidogrel (PLAVIX) 75 MG tablet, Take 75 mg by mouth Daily., Disp: , Rfl:   •  clopidogrel (PLAVIX) 75 MG tablet, Take 75 mg by mouth., Disp: , Rfl:   •  dicyclomine (BENTYL) 20 MG tablet, Take 1 tablet by mouth Every 6 (Six) Hours., Disp: 90 tablet, Rfl: 5  •  furosemide (LASIX) 40 MG tablet, Take 40 mg by mouth Daily., Disp: , Rfl:   •  furosemide (LASIX) 40 MG tablet, Take 40 mg by mouth., Disp: , Rfl:   •  glucose blood (FREESTYLE LITE) test strip, 1 each by Other route As Needed. Use as instructed, Disp: , Rfl:   •  glucose blood test strip, Test tid dx code 250.00 on insulin, Disp: , Rfl:   •  HYDROcodone-acetaminophen (NORCO) 7.5-325 MG per tablet, , Disp: , Rfl:   •  insulin aspart prot-insulin aspart (NOVOLOG MIX 70/30) (70-30) 100 UNIT/ML injection, INJECT 25 UNITS INTO THE SKIN THREE TIMES A DAY BEFORE MEALS, Disp: , Rfl:   •  insulin aspart protamine-insulin aspart (novoLOG 70/30) (70-30) 100 UNIT/ML injection, Inject  under the skin 2 (Two) Times a Day With Meals., Disp: , Rfl:   •  Insulin Syringe-Needle U-100 (GNP INSULIN SYRINGE) 31G X 5/16\" 0.3 ML misc, , Disp: , Rfl:   •  isosorbide mononitrate (IMDUR) 30 MG 24 hr tablet, Take 30 mg by mouth Daily., Disp: , Rfl:   •  isosorbide mononitrate (IMDUR) 30 MG 24 hr tablet, Take 30 mg by mouth., Disp: , Rfl:   •  Lancets (ACCU-CHEK MULTICLIX) lancets, Use to test three times a day, Disp: , Rfl:   •  lisinopril (PRINIVIL,ZESTRIL) 5 MG tablet, Take 5 mg by mouth Daily., Disp: , Rfl:   •  lisinopril (PRINIVIL,ZESTRIL) 5 MG tablet, Take 5 mg by mouth., Disp: , Rfl:   •  magnesium oxide (MAG-OX) 400 MG tablet, Take 400 mg by mouth., Disp: , Rfl:   •  magnesium oxide (MAGOX) 400 (241.3 MG) MG tablet tablet, Take 400 mg by mouth Daily., Disp: , Rfl:   •  meloxicam (MOBIC) 15 MG tablet, Take 15 mg by mouth Daily., Disp: , Rfl:   •  meloxicam (MOBIC) 15 MG tablet, Take 15 mg by mouth., Disp: , " "Rfl:   •  metFORMIN (GLUCOPHAGE) 1000 MG tablet, Take 1,000 mg by mouth 2 (Two) Times a Day With Meals., Disp: , Rfl:   •  metFORMIN (GLUCOPHAGE) 1000 MG tablet, Take 1,000 mg by mouth., Disp: , Rfl:   •  metoprolol succinate XL (TOPROL-XL) 25 MG 24 hr tablet, Take 25 mg by mouth Daily., Disp: , Rfl:   •  pantoprazole (PROTONIX) 40 MG EC tablet, Take 1 tablet by mouth Daily., Disp: 30 tablet, Rfl: 5  •  pantoprazole (PROTONIX) 40 MG EC tablet, TAKE ONE TABLET BY MOUTH DAILY, Disp: , Rfl:   •  pravastatin (PRAVACHOL) 40 MG tablet, Take 40 mg by mouth Daily., Disp: , Rfl:   •  pravastatin (PRAVACHOL) 40 MG tablet, Take 40 mg by mouth., Disp: , Rfl:   •  spironolactone (ALDACTONE) 25 MG tablet, , Disp: , Rfl: 11  •  spironolactone (ALDACTONE) 25 MG tablet, Take 25 mg by mouth., Disp: , Rfl:   •  TRUEPLUS INSULIN SYRINGE 31G X 5/16\" 0.5 ML misc, , Disp: , Rfl:   •  vitamin D (ERGOCALCIFEROL) 07140 UNITS capsule capsule, Take 50,000 Units by mouth 1 (One) Time Per Week., Disp: , Rfl:     No Known Allergies    Review of Systems   Musculoskeletal: Positive for back pain and neck pain.   All other systems reviewed and are negative.    All systems reviewed and negative except for above.    Physical Exam   Constitutional: She is oriented to person, place, and time. She appears well-developed and well-nourished. No distress.   Cardiovascular: Normal rate.    Pulmonary/Chest: Effort normal and breath sounds normal. No respiratory distress.   Abdominal: Soft. She exhibits no distension. There is no tenderness.   Musculoskeletal:        Cervical back: She exhibits tenderness. She exhibits normal range of motion.        Lumbar back: She exhibits decreased range of motion (Flex 45 deg and 10 deg ext with FL Gary) and tenderness ( midline and SI joint TTP gary).   Neurological: She is alert and oriented to person, place, and time. She displays no tremor. She displays no seizure activity. Gait (antalgic) abnormal. Coordination normal. "   Reflex Scores:       Tricep reflexes are 1+ on the right side and 1+ on the left side.       Bicep reflexes are 2+ on the right side and 2+ on the left side.       Brachioradialis reflexes are 2+ on the right side and 2+ on the left side.       Patellar reflexes are 1+ on the right side and 1+ on the left side.       Achilles reflexes are 1+ on the right side and 1+ on the left side.  Mild SLR right       Skin: Skin is warm and dry. No rash noted. No pallor.   Psychiatric: She has a normal mood and affect. Her behavior is normal. Judgment normal. She expresses no homicidal and no suicidal ideation. She expresses no suicidal plans and no homicidal plans.   Nursing note and vitals reviewed.      Mila was seen today for back pain and neck pain.    Diagnoses and all orders for this visit:    Chronic low back pain, unspecified back pain laterality, with sciatica presence unspecified    Lumbosacral spondylosis without myelopathy    Myofacial muscle pain    High risk medications (not anticoagulants) long-term use    Overweight    Cervicalgia        Medication: Patient reports no negative side effects, Patient reports appropriate usage and storage habits and Patient's opioid provides enough reflief to be more active and perform activities of daily living with less discomfort. Norco 7.5 mg qid. Discussed case with [facility], opiate medication refilled ×2 hand written scripts.      Interventional: Discussed lumbar injections, will decide in future. Chronic Plavix. ELBA and any neurological impairments discussed with patient that may need of emergency evaluation.      Rehab: home stretches.     Behavioral: No aberrant behavior noted. BHAVYA Report # [id number]  was reviewed and is consistent with stated history    Urine drug screen None at this time          This document has been electronically signed by NANO Evangelista on June 26, 2017 3:32 PM          This document has been electronically signed by Henry LUCAS  NANO Haskins on June 26, 2017 3:32 PM

## 2017-07-18 ENCOUNTER — OFFICE VISIT (OUTPATIENT)
Dept: PODIATRY | Facility: CLINIC | Age: 62
End: 2017-07-18

## 2017-07-18 VITALS — BODY MASS INDEX: 46.82 KG/M2 | HEIGHT: 65 IN | WEIGHT: 281 LBS

## 2017-07-18 DIAGNOSIS — L60.0 INGROWN TOENAIL: ICD-10-CM

## 2017-07-18 DIAGNOSIS — E11.42 TYPE 2 DIABETES MELLITUS WITH PERIPHERAL NEUROPATHY (HCC): Primary | ICD-10-CM

## 2017-07-18 DIAGNOSIS — M79.675 CHRONIC TOE PAIN, BILATERAL: ICD-10-CM

## 2017-07-18 DIAGNOSIS — M79.674 CHRONIC TOE PAIN, BILATERAL: ICD-10-CM

## 2017-07-18 DIAGNOSIS — G89.29 CHRONIC TOE PAIN, BILATERAL: ICD-10-CM

## 2017-07-18 PROCEDURE — 99203 OFFICE O/P NEW LOW 30 MIN: CPT | Performed by: PODIATRIST

## 2017-07-18 PROCEDURE — 11750 EXCISION NAIL&NAIL MATRIX: CPT | Performed by: PODIATRIST

## 2017-07-18 RX ORDER — DOXYCYCLINE HYCLATE 100 MG/1
100 CAPSULE ORAL 2 TIMES DAILY
Qty: 20 CAPSULE | Refills: 0 | Status: SHIPPED | OUTPATIENT
Start: 2017-07-18 | End: 2017-08-29

## 2017-07-18 NOTE — PROGRESS NOTES
Mlia Fisher  1955  62 y.o. female  Maribel-06/2017  BS-146 per pt    Patient presents today with a complaint of bilateral great toenail pain.    7/18/17    Chief Complaint   Patient presents with   • Left Foot - nail issue   • Right Foot - nail issue           History of Present Illness    Patient presents to clinic with cc of foot pain. Pain is located to the great toenails on both feet. She states that they are ingrowing. This is a chronic problem. She tries to dig them out herself but she is diabetic and is scared of complications. She rates the pain as a 6-7/10. Describes it as sharp. She denies any injuries to her feet. She admits to numbness and tingling in her feet. She has no other pedal complaints.          Past Medical History:   Diagnosis Date   • Acute bronchitis    • Anxiety     Anxiety state      • Arthropathy of lumbar facet joint    • Asthma    • Astigmatism    • At risk for adverse drug event     Adverse drug event resulting from treatment of disorder      • Back ache    • Benign essential hypertension    • Cholecystitis     mild on ultrasound      • Chronic back pain    • Contact dermatitis    • COPD (chronic obstructive pulmonary disease)    • Cough    • Depression     Depressive disorder, not elsewhere classified      • Diabetes mellitus     no retinopathy      • Diabetes mellitus without complication     type II or unspecified type, not stated as uncontrolled      • Diarrhea    • Drug therapy     Other long term (current) drug therapy      • Dyspnea    • Electrocardiogram abnormal    • Epigastric pain    • Episodic mood disorder     Unspecified    • Esophageal reflux    • Esophagitis     grade II   • Foot pain     VS SMALL PHALANX AVULSION      • Generalized abdominal pain    • Generalized anxiety disorder    • History of colon polyps    • Hypermetropia    • Irritable bowel syndrome    • Knee pain 03/22/2016     being evaluated for knee replacements.       • Malaise and fatigue     • Myofascial pain    • Nausea and vomiting    • Neck pain    • LJ (obstructive sleep apnea)    • Osteoarthritis    • Pain in wrist    • Polyp of intestine     large intestine   • Pure hypercholesterolemia    • Right upper quadrant pain    • Screening for hyperlipidemia    • Shoulder pain 09/02/2014    possible Rotator Cuff Tendinitis      • Spasm of back muscles    • Sprain of sacroiliac ligament    • Transient cerebral ischemia    • Type 2 diabetes mellitus    • Type II diabetes mellitus, uncontrolled    • Upper respiratory infection    • Vitamin D deficiency          Past Surgical History:   Procedure Laterality Date   • CARPAL TUNNEL RELEASE     • COLONOSCOPY     • COLONOSCOPY W/ POLYPECTOMY  06/10/2015    Colonoscopy remove polyps 79348 (1)      • ENDOSCOPY  06/10/2015    EGD w/ biopsy 25559 (1)      • INJECTION OF MEDICATION  09/02/2014    Drain/Inject Intermed Joint 20605 (1)      • TUBAL ABDOMINAL LIGATION      Tubal ligation (1)      • UPPER GASTROINTESTINAL ENDOSCOPY           Family History   Problem Relation Age of Onset   • Heart disease Other    • Bone cancer Other    • Lung cancer Other    • Throat cancer Other    • Cancer Other      Other   • Colon cancer Other      Colorectal Cancer   • Diabetes Other    • Hypertension Other          Social History     Social History   • Marital status:      Spouse name: N/A   • Number of children: N/A   • Years of education: N/A     Occupational History   • Not on file.     Social History Main Topics   • Smoking status: Former Smoker     Quit date: 1996   • Smokeless tobacco: Never Used      Comment: non smoker for years   • Alcohol use No   • Drug use: No   • Sexual activity: Defer     Other Topics Concern   • Not on file     Social History Narrative         Current Outpatient Prescriptions   Medication Sig Dispense Refill   • albuterol (PROVENTIL HFA;VENTOLIN HFA) 108 (90 BASE) MCG/ACT inhaler Inhale 2 puffs Every 4 (Four) Hours As Needed for wheezing.   "   • albuterol (PROVENTIL HFA;VENTOLIN HFA) 108 (90 BASE) MCG/ACT inhaler Inhale 2 puffs Every 6 (Six) Hours.     • albuterol (PROVENTIL) (2.5 MG/3ML) 0.083% nebulizer solution Take 2.5 mg by nebulization Every 4 (Four) Hours As Needed for wheezing.     • baclofen (LIORESAL) 10 MG tablet Take 10 mg by mouth.     • carvedilol (COREG) 6.25 MG tablet Take 6.25 mg by mouth 2 (Two) Times a Day With Meals.     • carvedilol (COREG) 6.25 MG tablet TAKE ONE TABLET BY MOUTH TWO TIMES DAILY WITH MEALS     • clopidogrel (PLAVIX) 75 MG tablet Take 75 mg by mouth Daily.     • clopidogrel (PLAVIX) 75 MG tablet Take 75 mg by mouth.     • dicyclomine (BENTYL) 20 MG tablet Take 1 tablet by mouth Every 6 (Six) Hours. 90 tablet 5   • furosemide (LASIX) 40 MG tablet Take 40 mg by mouth Daily.     • furosemide (LASIX) 40 MG tablet Take 40 mg by mouth.     • glucose blood (FREESTYLE LITE) test strip 1 each by Other route As Needed. Use as instructed     • glucose blood test strip Test tid dx code 250.00 on insulin     • HYDROcodone-acetaminophen (NORCO) 7.5-325 MG per tablet      • insulin aspart prot-insulin aspart (NOVOLOG MIX 70/30) (70-30) 100 UNIT/ML injection INJECT 25 UNITS INTO THE SKIN THREE TIMES A DAY BEFORE MEALS     • insulin aspart protamine-insulin aspart (novoLOG 70/30) (70-30) 100 UNIT/ML injection Inject  under the skin 2 (Two) Times a Day With Meals.     • Insulin Syringe-Needle U-100 (GNP INSULIN SYRINGE) 31G X 5/16\" 0.3 ML misc      • isosorbide mononitrate (IMDUR) 30 MG 24 hr tablet Take 30 mg by mouth Daily.     • isosorbide mononitrate (IMDUR) 30 MG 24 hr tablet Take 30 mg by mouth.     • Lancets (ACCU-CHEK MULTICLIX) lancets Use to test three times a day     • lisinopril (PRINIVIL,ZESTRIL) 5 MG tablet Take 5 mg by mouth Daily.     • lisinopril (PRINIVIL,ZESTRIL) 5 MG tablet Take 5 mg by mouth.     • magnesium oxide (MAG-OX) 400 MG tablet Take 400 mg by mouth.     • magnesium oxide (MAGOX) 400 (241.3 MG) MG tablet " "tablet Take 400 mg by mouth Daily.     • meloxicam (MOBIC) 15 MG tablet Take 15 mg by mouth Daily.     • meloxicam (MOBIC) 15 MG tablet Take 15 mg by mouth.     • metFORMIN (GLUCOPHAGE) 1000 MG tablet Take 1,000 mg by mouth 2 (Two) Times a Day With Meals.     • metFORMIN (GLUCOPHAGE) 1000 MG tablet Take 1,000 mg by mouth.     • metoprolol succinate XL (TOPROL-XL) 25 MG 24 hr tablet Take 25 mg by mouth Daily.     • pantoprazole (PROTONIX) 40 MG EC tablet Take 1 tablet by mouth Daily. 30 tablet 5   • pantoprazole (PROTONIX) 40 MG EC tablet TAKE ONE TABLET BY MOUTH DAILY     • pravastatin (PRAVACHOL) 40 MG tablet Take 40 mg by mouth Daily.     • pravastatin (PRAVACHOL) 40 MG tablet Take 40 mg by mouth.     • spironolactone (ALDACTONE) 25 MG tablet   11   • spironolactone (ALDACTONE) 25 MG tablet Take 25 mg by mouth.     • TRUEPLUS INSULIN SYRINGE 31G X 5/16\" 0.5 ML misc      • vitamin D (ERGOCALCIFEROL) 04792 UNITS capsule capsule Take 50,000 Units by mouth 1 (One) Time Per Week.     • doxycycline (VIBRAMYCIN) 100 MG capsule Take 1 capsule by mouth 2 (Two) Times a Day. 20 capsule 0     No current facility-administered medications for this visit.          OBJECTIVE    Ht 65\" (165.1 cm)  Wt 281 lb (127 kg)  BMI 46.76 kg/m2      Review of Systems   Constitutional: Negative for chills and fever.   Cardiovascular: Negative for chest pain.   Gastrointestinal: Negative for constipation, diarrhea, nausea and vomiting.   Skin: Negative for wound. ingrowing toenails  Musculoskeletal: foot pain      Constitutional: well developed, well nourished    HEENT: Normocephalic and atraumatic, normal hearing    Respiratory: Non labored respirations noted    Cardiovascular:    DP/PT pulses palpable    CFT brisk  to all digits  Skin temp is warm to warm from proximal tibia to distal digits  Pedal hair growth present.     Musculoskeletal:  Muscle strength is 5/5 for all muscle groups tested   ROM of the 1st MTP is full without pain or " crepitus  ROM of the MTJ is full without pain or crepitus    ROM of the STJ is full without pain or crepitus    ROM of the ankle joint is full without pain or crepitus     Rectus foot type     Dermatological:   Nails 1-5 are within normal limits for length and thickness, bilateral hallux nails are incurvated and ingrowing on the lateral borders. There is mild erythema and edema. no drainage. They are tender to palpation.     Skin is warm, dry and intact    Webspaces 1-4 bilateral are clean, dry and intact.   No subcutaneous nodules or masses noted    No open wounds noted     Neurological:   Protective sensation decreased  Sensation intact to light touch    DTR intact    Psychiatric: A&O x 3 with normal mood and affect. NAD.        Nail Removal  Date/Time: 7/18/2017 3:05 PM  Performed by: MANDY SAMSON  Authorized by: MANDY SAMSON   Consent: Verbal consent obtained. Written consent obtained.  Risks and benefits: risks, benefits and alternatives were discussed  Consent given by: patient  Patient understanding: patient states understanding of the procedure being performed  Patient identity confirmed: verbally with patient  Nail removal extremity: bilateral hallux nails.  Anesthesia: digital block    Anesthesia:  Local Anesthetic: lidocaine 2% without epinephrine    Sedation:  Patient sedated: no  Preparation: skin prepped with Betadine  Amount removed: partial (nail plate  from nail bed with blunt dissection and removed with a hemostat)  Nail matrix removed: partial  Dressing: antibiotic ointment and dressing applied  Patient tolerance: Patient tolerated the procedure well with no immediate complications              ASSESSMENT AND PLAN    Mila was seen today for nail issue and nail issue.    Diagnoses and all orders for this visit:    Type 2 diabetes mellitus with peripheral neuropathy    Chronic toe pain, bilateral    Ingrown toenail    Other orders  -     doxycycline (VIBRAMYCIN) 100 MG capsule;  Take 1 capsule by mouth 2 (Two) Times a Day.    - A diabetic foot screening exam was performed and the patient was educated on the foot complications related to diabetes,  preventative foot care and what signs and symptoms to watch for.  Instructed to contact our office if any foot problems develop before next visit.  - Diagnosis, prevention and treatment of ingrown toenails discussed with patient, including risks and potential benefits of nail avulsion both temporary and permanent versus simple debridement.  - Patient elected for a partial permanent nail avulsions  - Dispensed aftercare instruction sheet  - rx for doxycycline  - All questions were answered and the patient is in agreement with the current treatment plan.  - RTC in 1 weeks          This document has been electronically signed by Jozef Rubio DPM on July 19, 2017 7:00 AM     7/19/2017  7:00 AM    EMR Dragon/Transcription disclaimer:   Much of this encounter note is an electronic transcription/translation of spoken language to printed text. The electronic translation of spoken language may permit erroneous, or at times, nonsensical words or phrases to be inadvertently transcribed; Although I have reviewed the note for such errors, some may still exist.

## 2017-07-26 ENCOUNTER — OFFICE VISIT (OUTPATIENT)
Dept: PODIATRY | Facility: CLINIC | Age: 62
End: 2017-07-26

## 2017-07-26 VITALS — BODY MASS INDEX: 46.82 KG/M2 | WEIGHT: 281 LBS | HEIGHT: 65 IN

## 2017-07-26 DIAGNOSIS — L60.0 INGROWN TOENAIL: Primary | ICD-10-CM

## 2017-07-26 PROCEDURE — 99024 POSTOP FOLLOW-UP VISIT: CPT | Performed by: PODIATRIST

## 2017-07-26 NOTE — PROGRESS NOTES
Mila Fihser  1955  62 y.o. female  Maribel-06/2017  BS-146 per pt..Patient states she didn't check it today.     Patient presents today for a follow-up of bilateral great toenail pain.    7/26/17    Chief Complaint   Patient presents with   • Left Foot - Follow-up   • Right Foot - Follow-up           History of Present Illness    Patient presents to clinic for follow up of her great toenail avulsions. She has no pain today. She has been soaking and dressing the toes as instructed. She has no new pedal complaints.         Past Medical History:   Diagnosis Date   • Acute bronchitis    • Anxiety     Anxiety state      • Arthropathy of lumbar facet joint    • Asthma    • Astigmatism    • At risk for adverse drug event     Adverse drug event resulting from treatment of disorder      • Back ache    • Benign essential hypertension    • Cholecystitis     mild on ultrasound      • Chronic back pain    • Contact dermatitis    • COPD (chronic obstructive pulmonary disease)    • Cough    • Depression     Depressive disorder, not elsewhere classified      • Diabetes mellitus     no retinopathy      • Diabetes mellitus without complication     type II or unspecified type, not stated as uncontrolled      • Diarrhea    • Drug therapy     Other long term (current) drug therapy      • Dyspnea    • Electrocardiogram abnormal    • Epigastric pain    • Episodic mood disorder     Unspecified    • Esophageal reflux    • Esophagitis     grade II   • Foot pain     VS SMALL PHALANX AVULSION      • Generalized abdominal pain    • Generalized anxiety disorder    • History of colon polyps    • Hypermetropia    • Irritable bowel syndrome    • Knee pain 03/22/2016     being evaluated for knee replacements.       • Malaise and fatigue    • Myofascial pain    • Nausea and vomiting    • Neck pain    • LJ (obstructive sleep apnea)    • Osteoarthritis    • Pain in wrist    • Polyp of intestine     large intestine   • Pure  hypercholesterolemia    • Right upper quadrant pain    • Screening for hyperlipidemia    • Shoulder pain 09/02/2014    possible Rotator Cuff Tendinitis      • Spasm of back muscles    • Sprain of sacroiliac ligament    • Transient cerebral ischemia    • Type 2 diabetes mellitus    • Type II diabetes mellitus, uncontrolled    • Upper respiratory infection    • Vitamin D deficiency          Past Surgical History:   Procedure Laterality Date   • CARPAL TUNNEL RELEASE     • COLONOSCOPY     • COLONOSCOPY W/ POLYPECTOMY  06/10/2015    Colonoscopy remove polyps 32088 (1)      • ENDOSCOPY  06/10/2015    EGD w/ biopsy 57980 (1)      • INJECTION OF MEDICATION  09/02/2014    Drain/Inject Intermed Joint 20605 (1)      • TUBAL ABDOMINAL LIGATION      Tubal ligation (1)      • UPPER GASTROINTESTINAL ENDOSCOPY           Family History   Problem Relation Age of Onset   • Heart disease Other    • Bone cancer Other    • Lung cancer Other    • Throat cancer Other    • Cancer Other      Other   • Colon cancer Other      Colorectal Cancer   • Diabetes Other    • Hypertension Other          Social History     Social History   • Marital status:      Spouse name: N/A   • Number of children: N/A   • Years of education: N/A     Occupational History   • Not on file.     Social History Main Topics   • Smoking status: Former Smoker     Quit date: 1996   • Smokeless tobacco: Never Used      Comment: non smoker for years   • Alcohol use No   • Drug use: No   • Sexual activity: Defer     Other Topics Concern   • Not on file     Social History Narrative         Current Outpatient Prescriptions   Medication Sig Dispense Refill   • albuterol (PROVENTIL HFA;VENTOLIN HFA) 108 (90 BASE) MCG/ACT inhaler Inhale 2 puffs Every 4 (Four) Hours As Needed for wheezing.     • albuterol (PROVENTIL HFA;VENTOLIN HFA) 108 (90 BASE) MCG/ACT inhaler Inhale 2 puffs Every 6 (Six) Hours.     • albuterol (PROVENTIL) (2.5 MG/3ML) 0.083% nebulizer solution Take 2.5  "mg by nebulization Every 4 (Four) Hours As Needed for wheezing.     • baclofen (LIORESAL) 10 MG tablet Take 10 mg by mouth.     • carvedilol (COREG) 6.25 MG tablet Take 6.25 mg by mouth 2 (Two) Times a Day With Meals.     • carvedilol (COREG) 6.25 MG tablet TAKE ONE TABLET BY MOUTH TWO TIMES DAILY WITH MEALS     • clopidogrel (PLAVIX) 75 MG tablet Take 75 mg by mouth Daily.     • clopidogrel (PLAVIX) 75 MG tablet Take 75 mg by mouth.     • dicyclomine (BENTYL) 20 MG tablet Take 1 tablet by mouth Every 6 (Six) Hours. 90 tablet 5   • doxycycline (VIBRAMYCIN) 100 MG capsule Take 1 capsule by mouth 2 (Two) Times a Day. 20 capsule 0   • furosemide (LASIX) 40 MG tablet Take 40 mg by mouth Daily.     • furosemide (LASIX) 40 MG tablet Take 40 mg by mouth.     • glucose blood (FREESTYLE LITE) test strip 1 each by Other route As Needed. Use as instructed     • glucose blood test strip Test tid dx code 250.00 on insulin     • HYDROcodone-acetaminophen (NORCO) 7.5-325 MG per tablet      • insulin aspart prot-insulin aspart (NOVOLOG MIX 70/30) (70-30) 100 UNIT/ML injection INJECT 25 UNITS INTO THE SKIN THREE TIMES A DAY BEFORE MEALS     • insulin aspart protamine-insulin aspart (novoLOG 70/30) (70-30) 100 UNIT/ML injection Inject  under the skin 2 (Two) Times a Day With Meals.     • Insulin Syringe-Needle U-100 (GNP INSULIN SYRINGE) 31G X 5/16\" 0.3 ML misc      • isosorbide mononitrate (IMDUR) 30 MG 24 hr tablet Take 30 mg by mouth Daily.     • isosorbide mononitrate (IMDUR) 30 MG 24 hr tablet Take 30 mg by mouth.     • Lancets (ACCU-CHEK MULTICLIX) lancets Use to test three times a day     • lisinopril (PRINIVIL,ZESTRIL) 5 MG tablet Take 5 mg by mouth Daily.     • lisinopril (PRINIVIL,ZESTRIL) 5 MG tablet Take 5 mg by mouth.     • magnesium oxide (MAG-OX) 400 MG tablet Take 400 mg by mouth.     • magnesium oxide (MAGOX) 400 (241.3 MG) MG tablet tablet Take 400 mg by mouth Daily.     • meloxicam (MOBIC) 15 MG tablet Take 15 mg " "by mouth Daily.     • meloxicam (MOBIC) 15 MG tablet Take 15 mg by mouth.     • metFORMIN (GLUCOPHAGE) 1000 MG tablet Take 1,000 mg by mouth 2 (Two) Times a Day With Meals.     • metFORMIN (GLUCOPHAGE) 1000 MG tablet Take 1,000 mg by mouth.     • metoprolol succinate XL (TOPROL-XL) 25 MG 24 hr tablet Take 25 mg by mouth Daily.     • pantoprazole (PROTONIX) 40 MG EC tablet Take 1 tablet by mouth Daily. 30 tablet 5   • pantoprazole (PROTONIX) 40 MG EC tablet TAKE ONE TABLET BY MOUTH DAILY     • pravastatin (PRAVACHOL) 40 MG tablet Take 40 mg by mouth Daily.     • pravastatin (PRAVACHOL) 40 MG tablet Take 40 mg by mouth.     • spironolactone (ALDACTONE) 25 MG tablet   11   • spironolactone (ALDACTONE) 25 MG tablet Take 25 mg by mouth.     • TRUEPLUS INSULIN SYRINGE 31G X 5/16\" 0.5 ML misc      • vitamin D (ERGOCALCIFEROL) 29748 UNITS capsule capsule Take 50,000 Units by mouth 1 (One) Time Per Week.       No current facility-administered medications for this visit.          OBJECTIVE    Ht 65\" (165.1 cm)  Wt 281 lb (127 kg)  BMI 46.76 kg/m2      Review of Systems   Constitutional: Negative for chills and fever.   Cardiovascular: Negative for chest pain.   Gastrointestinal: Negative for constipation, diarrhea, nausea and vomiting.   Skin: Negative for wound  Musculoskeletal: neg foot pain      Constitutional: well developed, well nourished    HEENT: Normocephalic and atraumatic, normal hearing    Respiratory: Non labored respirations noted    Cardiovascular:    DP/PT pulses palpable    CFT brisk  to all digits  Skin temp is warm to warm from proximal tibia to distal digits  Pedal hair growth present.     Musculoskeletal:  Muscle strength is 5/5 for all muscle groups tested   ROM of the 1st MTP is full without pain or crepitus  ROM of the MTJ is full without pain or crepitus    ROM of the STJ is full without pain or crepitus    ROM of the ankle joint is full without pain or crepitus     Rectus foot type "     Dermatological:   Nails 1-5 are within normal limits for length and thickness, bilateral hallux nails are absent on the lateral borders. No signs of infection   Skin is warm, dry and intact    Webspaces 1-4 bilateral are clean, dry and intact.   No subcutaneous nodules or masses noted    No open wounds noted     Neurological:   Protective sensation decreased  Sensation intact to light touch    DTR intact    Psychiatric: A&O x 3 with normal mood and affect. NAD.        Procedures        ASSESSMENT AND PLAN    Mila was seen today for follow-up and follow-up.    Diagnoses and all orders for this visit:    Ingrown toenail    - continue soaking and dressing the toes until there is no drainage on the band-aid. Then ok to dc soaks and dressings.   - All questions were answered and the patient is in agreement with the current treatment plan.  - RTC as needed          This document has been electronically signed by Jozef Rubio DPM on July 27, 2017 1:55 PM     7/27/2017  1:55 PM    EMR Dragon/Transcription disclaimer:   Much of this encounter note is an electronic transcription/translation of spoken language to printed text. The electronic translation of spoken language may permit erroneous, or at times, nonsensical words or phrases to be inadvertently transcribed; Although I have reviewed the note for such errors, some may still exist.

## 2017-08-08 ENCOUNTER — OFFICE VISIT (OUTPATIENT)
Dept: PODIATRY | Facility: CLINIC | Age: 62
End: 2017-08-08

## 2017-08-08 VITALS — WEIGHT: 281 LBS | HEIGHT: 65 IN | BODY MASS INDEX: 46.82 KG/M2

## 2017-08-08 DIAGNOSIS — L60.0 INGROWN TOENAIL: Primary | ICD-10-CM

## 2017-08-08 PROCEDURE — 99212 OFFICE O/P EST SF 10 MIN: CPT | Performed by: PODIATRIST

## 2017-08-08 NOTE — PROGRESS NOTES
Mila Funk [place]  1955  62 y.o. female  Maribel-06/2017  BS-149 per pt.  Patient presents today for a follow-up of bilateral great toenail pain.    08/08/17      Chief Complaint   Patient presents with   • Left Foot - Follow-up   • Right Foot - Follow-up           History of Present Illness    Patient presents to clinic for follow up of her great toenail avulsions. She has no pain today. She has been soaking and dressing the toes as instructed. She has no new pedal complaints.         Past Medical History:   Diagnosis Date   • Acute bronchitis    • Anxiety     Anxiety state      • Arthropathy of lumbar facet joint    • Asthma    • Astigmatism    • At risk for adverse drug event     Adverse drug event resulting from treatment of disorder      • Back ache    • Benign essential hypertension    • Cholecystitis     mild on ultrasound      • Chronic back pain    • Contact dermatitis    • COPD (chronic obstructive pulmonary disease)    • Cough    • Depression     Depressive disorder, not elsewhere classified      • Diabetes mellitus     no retinopathy      • Diabetes mellitus without complication     type II or unspecified type, not stated as uncontrolled      • Diarrhea    • Drug therapy     Other long term (current) drug therapy      • Dyspnea    • Electrocardiogram abnormal    • Epigastric pain    • Episodic mood disorder     Unspecified    • Esophageal reflux    • Esophagitis     grade II   • Foot pain     VS SMALL PHALANX AVULSION      • Generalized abdominal pain    • Generalized anxiety disorder    • History of colon polyps    • Hypermetropia    • Irritable bowel syndrome    • Knee pain 03/22/2016     being evaluated for knee replacements.       • Malaise and fatigue    • Myofascial pain    • Nausea and vomiting    • Neck pain    • LJ (obstructive sleep apnea)    • Osteoarthritis    • Pain in wrist    • Polyp of intestine     large intestine   • Pure hypercholesterolemia    • Right upper quadrant pain     • Screening for hyperlipidemia    • Shoulder pain 09/02/2014    possible Rotator Cuff Tendinitis      • Spasm of back muscles    • Sprain of sacroiliac ligament    • Transient cerebral ischemia    • Type 2 diabetes mellitus    • Type II diabetes mellitus, uncontrolled    • Upper respiratory infection    • Vitamin D deficiency          Past Surgical History:   Procedure Laterality Date   • CARPAL TUNNEL RELEASE     • COLONOSCOPY     • COLONOSCOPY W/ POLYPECTOMY  06/10/2015    Colonoscopy remove polyps 18538 (1)      • ENDOSCOPY  06/10/2015    EGD w/ biopsy 33603 (1)      • INJECTION OF MEDICATION  09/02/2014    Drain/Inject Intermed Joint 20605 (1)      • TUBAL ABDOMINAL LIGATION      Tubal ligation (1)      • UPPER GASTROINTESTINAL ENDOSCOPY           Family History   Problem Relation Age of Onset   • Heart disease Other    • Bone cancer Other    • Lung cancer Other    • Throat cancer Other    • Cancer Other      Other   • Colon cancer Other      Colorectal Cancer   • Diabetes Other    • Hypertension Other          Social History     Social History   • Marital status:      Spouse name: N/A   • Number of children: N/A   • Years of education: N/A     Occupational History   • Not on file.     Social History Main Topics   • Smoking status: Former Smoker     Quit date: 1996   • Smokeless tobacco: Never Used      Comment: non smoker for years   • Alcohol use No   • Drug use: No   • Sexual activity: Defer     Other Topics Concern   • Not on file     Social History Narrative         Current Outpatient Prescriptions   Medication Sig Dispense Refill   • albuterol (PROVENTIL HFA;VENTOLIN HFA) 108 (90 BASE) MCG/ACT inhaler Inhale 2 puffs Every 4 (Four) Hours As Needed for wheezing.     • albuterol (PROVENTIL HFA;VENTOLIN HFA) 108 (90 BASE) MCG/ACT inhaler Inhale 2 puffs Every 6 (Six) Hours.     • albuterol (PROVENTIL) (2.5 MG/3ML) 0.083% nebulizer solution Take 2.5 mg by nebulization Every 4 (Four) Hours As Needed for  "wheezing.     • baclofen (LIORESAL) 10 MG tablet Take 10 mg by mouth.     • carvedilol (COREG) 6.25 MG tablet Take 6.25 mg by mouth 2 (Two) Times a Day With Meals.     • carvedilol (COREG) 6.25 MG tablet TAKE ONE TABLET BY MOUTH TWO TIMES DAILY WITH MEALS     • clopidogrel (PLAVIX) 75 MG tablet Take 75 mg by mouth Daily.     • clopidogrel (PLAVIX) 75 MG tablet Take 75 mg by mouth.     • dicyclomine (BENTYL) 20 MG tablet Take 1 tablet by mouth Every 6 (Six) Hours. 90 tablet 5   • doxycycline (VIBRAMYCIN) 100 MG capsule Take 1 capsule by mouth 2 (Two) Times a Day. 20 capsule 0   • furosemide (LASIX) 40 MG tablet Take 40 mg by mouth Daily.     • furosemide (LASIX) 40 MG tablet Take 40 mg by mouth.     • glucose blood (FREESTYLE LITE) test strip 1 each by Other route As Needed. Use as instructed     • glucose blood test strip Test tid dx code 250.00 on insulin     • HYDROcodone-acetaminophen (NORCO) 7.5-325 MG per tablet      • insulin aspart prot-insulin aspart (NOVOLOG MIX 70/30) (70-30) 100 UNIT/ML injection INJECT 25 UNITS INTO THE SKIN THREE TIMES A DAY BEFORE MEALS     • insulin aspart protamine-insulin aspart (novoLOG 70/30) (70-30) 100 UNIT/ML injection Inject  under the skin 2 (Two) Times a Day With Meals.     • Insulin Syringe-Needle U-100 (GNP INSULIN SYRINGE) 31G X 5/16\" 0.3 ML misc      • isosorbide mononitrate (IMDUR) 30 MG 24 hr tablet Take 30 mg by mouth Daily.     • isosorbide mononitrate (IMDUR) 30 MG 24 hr tablet Take 30 mg by mouth.     • Lancets (ACCU-CHEK MULTICLIX) lancets Use to test three times a day     • lisinopril (PRINIVIL,ZESTRIL) 5 MG tablet Take 5 mg by mouth Daily.     • lisinopril (PRINIVIL,ZESTRIL) 5 MG tablet Take 5 mg by mouth.     • magnesium oxide (MAG-OX) 400 MG tablet Take 400 mg by mouth.     • magnesium oxide (MAGOX) 400 (241.3 MG) MG tablet tablet Take 400 mg by mouth Daily.     • meloxicam (MOBIC) 15 MG tablet Take 15 mg by mouth Daily.     • meloxicam (MOBIC) 15 MG tablet " "Take 15 mg by mouth.     • metFORMIN (GLUCOPHAGE) 1000 MG tablet Take 1,000 mg by mouth 2 (Two) Times a Day With Meals.     • metFORMIN (GLUCOPHAGE) 1000 MG tablet Take 1,000 mg by mouth.     • metoprolol succinate XL (TOPROL-XL) 25 MG 24 hr tablet Take 25 mg by mouth Daily.     • pantoprazole (PROTONIX) 40 MG EC tablet Take 1 tablet by mouth Daily. 30 tablet 5   • pantoprazole (PROTONIX) 40 MG EC tablet TAKE ONE TABLET BY MOUTH DAILY     • pravastatin (PRAVACHOL) 40 MG tablet Take 40 mg by mouth Daily.     • pravastatin (PRAVACHOL) 40 MG tablet Take 40 mg by mouth.     • spironolactone (ALDACTONE) 25 MG tablet   11   • spironolactone (ALDACTONE) 25 MG tablet Take 25 mg by mouth.     • TRUEPLUS INSULIN SYRINGE 31G X 5/16\" 0.5 ML misc      • vitamin D (ERGOCALCIFEROL) 48846 UNITS capsule capsule Take 50,000 Units by mouth 1 (One) Time Per Week.       No current facility-administered medications for this visit.          OBJECTIVE    Ht 65\" (165.1 cm)  Wt 281 lb (127 kg)  BMI 46.76 kg/m2      Review of Systems   Constitutional: Negative for chills and fever.   Cardiovascular: Negative for chest pain.   Gastrointestinal: Negative for constipation, diarrhea, nausea and vomiting.   Skin: Negative for wound  Musculoskeletal: neg foot pain      Constitutional: well developed, well nourished    HEENT: Normocephalic and atraumatic, normal hearing    Respiratory: Non labored respirations noted    Cardiovascular:    DP/PT pulses palpable    CFT brisk  to all digits  Skin temp is warm to warm from proximal tibia to distal digits  Pedal hair growth present.     Musculoskeletal:  Muscle strength is 5/5 for all muscle groups tested   ROM of the 1st MTP is full without pain or crepitus  ROM of the MTJ is full without pain or crepitus    ROM of the STJ is full without pain or crepitus    ROM of the ankle joint is full without pain or crepitus     Rectus foot type     Dermatological:   Nails 1-5 are within normal limits for length " and thickness, bilateral hallux nails are absent on the lateral borders. No signs of infection   Skin is warm, dry and intact    Webspaces 1-4 bilateral are clean, dry and intact.   No subcutaneous nodules or masses noted    No open wounds noted     Neurological:   Protective sensation decreased  Sensation intact to light touch    DTR intact    Psychiatric: A&O x 3 with normal mood and affect. NAD.        Procedures        ASSESSMENT AND PLAN    Mila was seen today for follow-up and follow-up.    Diagnoses and all orders for this visit:    Ingrown toenail    - continue soaking and dressing the toes until there is no drainage on the band-aid. Then ok to dc soaks and dressings.   - All questions were answered and the patient is in agreement with the current treatment plan.  - RTC as needed          This document has been electronically signed by Jozef Rubio DPM on August 8, 2017 6:18 PM     8/8/2017  6:18 PM    EMR Dragon/Transcription disclaimer:   Much of this encounter note is an electronic transcription/translation of spoken language to printed text. The electronic translation of spoken language may permit erroneous, or at times, nonsensical words or phrases to be inadvertently transcribed; Although I have reviewed the note for such errors, some may still exist.

## 2017-08-29 ENCOUNTER — OFFICE VISIT (OUTPATIENT)
Dept: PAIN MEDICINE | Facility: CLINIC | Age: 62
End: 2017-08-29

## 2017-08-29 VITALS
SYSTOLIC BLOOD PRESSURE: 110 MMHG | WEIGHT: 281.8 LBS | DIASTOLIC BLOOD PRESSURE: 64 MMHG | BODY MASS INDEX: 49.93 KG/M2 | HEIGHT: 63 IN

## 2017-08-29 DIAGNOSIS — M47.817 LUMBOSACRAL SPONDYLOSIS WITHOUT MYELOPATHY: Primary | ICD-10-CM

## 2017-08-29 DIAGNOSIS — M79.18 MYOFASCIAL PAIN: ICD-10-CM

## 2017-08-29 DIAGNOSIS — E66.01 MORBID OBESITY DUE TO EXCESS CALORIES (HCC): ICD-10-CM

## 2017-08-29 DIAGNOSIS — Z79.899 HIGH RISK MEDICATIONS (NOT ANTICOAGULANTS) LONG-TERM USE: ICD-10-CM

## 2017-08-29 PROCEDURE — 99213 OFFICE O/P EST LOW 20 MIN: CPT | Performed by: PAIN MEDICINE

## 2017-08-29 RX ORDER — HYDROCODONE BITARTRATE AND ACETAMINOPHEN 7.5; 325 MG/1; MG/1
1 TABLET ORAL 4 TIMES DAILY
Qty: 120 TABLET | Refills: 0 | Status: SHIPPED | OUTPATIENT
Start: 2017-08-29 | End: 2017-09-28

## 2017-08-29 NOTE — PROGRESS NOTES
Mila Fisher is a 62 y.o. female.   1955    HPI:   Location: neck and lower back  Quality: aching, sharp and dull  Severity: 4/10  Timing: constant  Alleviating: pain medication  Aggravating: increased activity     Patient reports that the opioid medication still provides her good relief and allows increased activity than she would have without the opioid medication.  She denies side effects.        The following portions of the patient's history were reviewed by me and updated as appropriate: allergies, current medications, past family history, past medical history, past social history, past surgical history and problem list.    Past Medical History:   Diagnosis Date   • Acute bronchitis    • Anxiety     Anxiety state      • Arthropathy of lumbar facet joint    • Asthma    • Astigmatism    • At risk for adverse drug event     Adverse drug event resulting from treatment of disorder      • Back ache    • Benign essential hypertension    • Cholecystitis     mild on ultrasound      • Chronic back pain    • Contact dermatitis    • COPD (chronic obstructive pulmonary disease)    • Cough    • Depression     Depressive disorder, not elsewhere classified      • Diabetes mellitus     no retinopathy      • Diabetes mellitus without complication     type II or unspecified type, not stated as uncontrolled      • Diarrhea    • Drug therapy     Other long term (current) drug therapy      • Dyspnea    • Electrocardiogram abnormal    • Epigastric pain    • Episodic mood disorder     Unspecified    • Esophageal reflux    • Esophagitis     grade II   • Foot pain     VS SMALL PHALANX AVULSION      • Generalized abdominal pain    • Generalized anxiety disorder    • History of colon polyps    • Hypermetropia    • Irritable bowel syndrome    • Knee pain 03/22/2016     being evaluated for knee replacements.       • Malaise and fatigue    • Myofascial pain    • Nausea and vomiting    • Neck pain    • LJ (obstructive sleep  apnea)    • Osteoarthritis    • Pain in wrist    • Polyp of intestine     large intestine   • Pure hypercholesterolemia    • Right upper quadrant pain    • Screening for hyperlipidemia    • Shoulder pain 09/02/2014    possible Rotator Cuff Tendinitis      • Spasm of back muscles    • Sprain of sacroiliac ligament    • Transient cerebral ischemia    • Type 2 diabetes mellitus    • Type II diabetes mellitus, uncontrolled    • Upper respiratory infection    • Vitamin D deficiency        Social History     Social History   • Marital status:      Spouse name: N/A   • Number of children: N/A   • Years of education: N/A     Occupational History   • Not on file.     Social History Main Topics   • Smoking status: Former Smoker     Quit date: 1996   • Smokeless tobacco: Never Used      Comment: non smoker for years   • Alcohol use No   • Drug use: No   • Sexual activity: Defer     Other Topics Concern   • Not on file     Social History Narrative       Family History   Problem Relation Age of Onset   • Heart disease Other    • Bone cancer Other    • Lung cancer Other    • Throat cancer Other    • Cancer Other      Other   • Colon cancer Other      Colorectal Cancer   • Diabetes Other    • Hypertension Other          Current Outpatient Prescriptions:   •  albuterol (PROVENTIL HFA;VENTOLIN HFA) 108 (90 BASE) MCG/ACT inhaler, Inhale 2 puffs Every 4 (Four) Hours As Needed for wheezing., Disp: , Rfl:   •  albuterol (PROVENTIL) (2.5 MG/3ML) 0.083% nebulizer solution, Take 2.5 mg by nebulization Every 4 (Four) Hours As Needed for wheezing., Disp: , Rfl:   •  baclofen (LIORESAL) 10 MG tablet, Take 10 mg by mouth., Disp: , Rfl:   •  clopidogrel (PLAVIX) 75 MG tablet, Take 75 mg by mouth Daily., Disp: , Rfl:   •  dicyclomine (BENTYL) 20 MG tablet, Take 1 tablet by mouth Every 6 (Six) Hours., Disp: 90 tablet, Rfl: 5  •  furosemide (LASIX) 40 MG tablet, Take 40 mg by mouth Daily., Disp: , Rfl:   •  glucose blood (FREESTYLE LITE)  "test strip, 1 each by Other route As Needed. Use as instructed, Disp: , Rfl:   •  glucose blood test strip, Test tid dx code 250.00 on insulin, Disp: , Rfl:   •  HYDROcodone-acetaminophen (NORCO) 7.5-325 MG per tablet, , Disp: , Rfl:   •  insulin aspart prot-insulin aspart (NOVOLOG MIX 70/30) (70-30) 100 UNIT/ML injection, INJECT 25 UNITS INTO THE SKIN THREE TIMES A DAY BEFORE MEALS, Disp: , Rfl:   •  Insulin Syringe-Needle U-100 (GNP INSULIN SYRINGE) 31G X 5/16\" 0.3 ML misc, , Disp: , Rfl:   •  isosorbide mononitrate (IMDUR) 30 MG 24 hr tablet, Take 30 mg by mouth Daily., Disp: , Rfl:   •  Lancets (ACCU-CHEK MULTICLIX) lancets, Use to test three times a day, Disp: , Rfl:   •  lisinopril (PRINIVIL,ZESTRIL) 5 MG tablet, Take 10 mg by mouth Daily., Disp: , Rfl:   •  magnesium oxide (MAGOX) 400 (241.3 MG) MG tablet tablet, Take 400 mg by mouth Daily., Disp: , Rfl:   •  meloxicam (MOBIC) 15 MG tablet, Take 15 mg by mouth Daily., Disp: , Rfl:   •  metFORMIN (GLUCOPHAGE) 1000 MG tablet, Take 1,000 mg by mouth 2 (Two) Times a Day With Meals., Disp: , Rfl:   •  metoprolol succinate XL (TOPROL-XL) 25 MG 24 hr tablet, Take 12.5 mg by mouth Daily., Disp: , Rfl:   •  pantoprazole (PROTONIX) 40 MG EC tablet, Take 1 tablet by mouth Daily., Disp: 30 tablet, Rfl: 5  •  pravastatin (PRAVACHOL) 40 MG tablet, Take 40 mg by mouth Daily., Disp: , Rfl:   •  spironolactone (ALDACTONE) 25 MG tablet, , Disp: , Rfl: 11  •  TRUEPLUS INSULIN SYRINGE 31G X 5/16\" 0.5 ML misc, , Disp: , Rfl:   •  vitamin D (ERGOCALCIFEROL) 91675 UNITS capsule capsule, Take 50,000 Units by mouth 1 (One) Time Per Week., Disp: , Rfl:   •  HYDROcodone-acetaminophen (NORCO) 7.5-325 MG per tablet, Take 1 tablet by mouth 4 (Four) Times a Day for 30 days., Disp: 120 tablet, Rfl: 0  •  HYDROcodone-acetaminophen (NORCO) 7.5-325 MG per tablet, Take 1 tablet by mouth 4 (Four) Times a Day for 30 days., Disp: 120 tablet, Rfl: 0    No Known Allergies    Review of Systems "   Musculoskeletal: Positive for back pain (lower) and neck pain.   All other systems reviewed and are negative.    All systems reviewed and negative except for above.    Physical Exam   Constitutional: She appears well-developed and well-nourished. No distress.   Musculoskeletal:        Lumbar back: She exhibits decreased range of motion (ext to about 5 deg).   Neurological: She is alert. Gait (antalgic) abnormal.   Psychiatric: She has a normal mood and affect. Her behavior is normal. Judgment normal.       Mila was seen today for neck pain and back pain.    Diagnoses and all orders for this visit:    Lumbosacral spondylosis without myelopathy    Myofascial pain    High risk medications (not anticoagulants) long-term use    Morbid obesity due to excess calories    Other orders  -     HYDROcodone-acetaminophen (NORCO) 7.5-325 MG per tablet; Take 1 tablet by mouth 4 (Four) Times a Day for 30 days.  -     HYDROcodone-acetaminophen (NORCO) 7.5-325 MG per tablet; Take 1 tablet by mouth 4 (Four) Times a Day for 30 days.        Medication: Patient reports no negative side effects, Patient reports appropriate usage and storage habits, Patient's opioid provides enough reflief to be more active and perform activities of daily living with less discomfort. and Refill opioid medication as above.    Interventional: none at this time    Rehab: none at this time    Behavioral: No aberrant behavior noted. [facility] Report #[id number]  was reviewed and is consistent with stated history    Urine drug screen None at this time          This document has been electronically signed by Bryce Edmonds MD on August 29, 2017 11:11 AM

## 2017-09-11 ENCOUNTER — LAB (OUTPATIENT)
Dept: LAB | Facility: HOSPITAL | Age: 62
End: 2017-09-11

## 2017-09-11 ENCOUNTER — OFFICE VISIT (OUTPATIENT)
Dept: FAMILY MEDICINE CLINIC | Facility: CLINIC | Age: 62
End: 2017-09-11

## 2017-09-11 VITALS
OXYGEN SATURATION: 98 % | HEIGHT: 63 IN | SYSTOLIC BLOOD PRESSURE: 118 MMHG | BODY MASS INDEX: 49.26 KG/M2 | DIASTOLIC BLOOD PRESSURE: 74 MMHG | WEIGHT: 278 LBS | HEART RATE: 54 BPM

## 2017-09-11 DIAGNOSIS — E78.00 PURE HYPERCHOLESTEROLEMIA: ICD-10-CM

## 2017-09-11 DIAGNOSIS — I10 BENIGN ESSENTIAL HYPERTENSION: ICD-10-CM

## 2017-09-11 DIAGNOSIS — M47.817 LUMBOSACRAL SPONDYLOSIS WITHOUT MYELOPATHY: ICD-10-CM

## 2017-09-11 DIAGNOSIS — Z11.59 NEED FOR HEPATITIS C SCREENING TEST: ICD-10-CM

## 2017-09-11 DIAGNOSIS — IMO0001 MYALGIA AND MYOSITIS: ICD-10-CM

## 2017-09-11 DIAGNOSIS — Z76.89 ESTABLISHING CARE WITH NEW DOCTOR, ENCOUNTER FOR: ICD-10-CM

## 2017-09-11 DIAGNOSIS — F32.0 MILD SINGLE CURRENT EPISODE OF MAJOR DEPRESSIVE DISORDER (HCC): ICD-10-CM

## 2017-09-11 DIAGNOSIS — M25.561 ACUTE PAIN OF RIGHT KNEE: Primary | ICD-10-CM

## 2017-09-11 PROCEDURE — 36415 COLL VENOUS BLD VENIPUNCTURE: CPT

## 2017-09-11 PROCEDURE — 99214 OFFICE O/P EST MOD 30 MIN: CPT | Performed by: FAMILY MEDICINE

## 2017-09-11 PROCEDURE — 80074 ACUTE HEPATITIS PANEL: CPT | Performed by: FAMILY MEDICINE

## 2017-09-11 RX ORDER — ATORVASTATIN CALCIUM 40 MG/1
40 TABLET, FILM COATED ORAL DAILY
Qty: 30 TABLET | Refills: 10 | Status: SHIPPED | OUTPATIENT
Start: 2017-09-11 | End: 2018-01-08

## 2017-09-11 RX ORDER — SENNOSIDES 8.6 MG
650 CAPSULE ORAL EVERY 8 HOURS PRN
Qty: 60 TABLET | Refills: 1 | Status: SHIPPED | OUTPATIENT
Start: 2017-09-11 | End: 2018-03-16

## 2017-09-11 RX ORDER — BUPROPION HYDROCHLORIDE 150 MG/1
150 TABLET ORAL DAILY
Qty: 30 TABLET | Refills: 1 | Status: SHIPPED | OUTPATIENT
Start: 2017-09-11 | End: 2017-10-05 | Stop reason: SDUPTHER

## 2017-09-11 NOTE — PROGRESS NOTES
ID: Mila Fisher    CC:   Chief Complaint   Patient presents with   • Diabetes   • Leg Pain     right/going on for 2 weeks       Subjective:     Mila Fisher is a 62 y.o. female who presents for initial evaluation to establish care.  She is a previous patient of Dr. López at [facility].  She states she would like all her doctors at one location.      HTN:  Subjective   Mila Fisher is a 62 y.o. female who presents for evaluation of elevated blood pressures. Age at onset of elevated blood pressure:  55. Cardiac symptoms: claudication, lower extremity edema, near-syncope and orthopnea. Patient denies: chest pain, irregular heart beat and paroxysmal nocturnal dyspnea. Cardiovascular risk factors: advanced age (older than 55 for men, 65 for women), diabetes mellitus, dyslipidemia, hypertension, obesity (BMI >= 30 kg/m2), sedentary lifestyle and smoking/ tobacco exposure. Use of agents associated with hypertension: none. History of target organ damage: heart failure and transient ischemic attack.    Leg pain: Patient states 2 weeks ago she was getting out of the truck and when she stepped down she got a sharp pain in the back of her right knee.  Pain rated as 10/10 when this occurred, she has been having constant 10/10 sharp pain since the incident.  Patient has tried nothing at home for the pain other than icy hot which helped slightly.  Patient also complains she has a popping sound when she moves her legs.  Pain radiates down the back of the right leg to the ankle.  She feels the leg is weaker than usual and swollen.  Pain is mainly in back of knee, but entire knee is sore and swollen.  It has been gradually worsening since the incident.      Diabetes Mellitus Type 2: Patient has had labs recently done at [facility].  HbA1c 8/15: 6.8.  Diabetic eye exam done august 2017.       Preventative:  Over the past 2 weeks, have you felt down, depressed, or hopeless?Yes   Over the past 2 weeks,  have you felt little interest or pleasure in doing things?Yes  Clinical depression screening refused by patient.No PHQ9: 10    On osteoporosis therapy?No     Past Medical Hx:  Past Medical History:   Diagnosis Date   • Acute bronchitis    • Anxiety     Anxiety state      • Arthropathy of lumbar facet joint    • Asthma    • Astigmatism    • At risk for adverse drug event     Adverse drug event resulting from treatment of disorder      • Back ache    • Benign essential hypertension    • Cholecystitis     mild on ultrasound      • Chronic back pain    • Contact dermatitis    • COPD (chronic obstructive pulmonary disease)    • Cough    • Depression     Depressive disorder, not elsewhere classified      • Diabetes mellitus     no retinopathy      • Diabetes mellitus without complication     type II or unspecified type, not stated as uncontrolled      • Diarrhea    • Drug therapy     Other long term (current) drug therapy      • Dyspnea    • Electrocardiogram abnormal    • Epigastric pain    • Episodic mood disorder     Unspecified    • Esophageal reflux    • Esophagitis     grade II   • Foot pain     VS SMALL PHALANX AVULSION      • Generalized abdominal pain    • Generalized anxiety disorder    • History of colon polyps    • Hypermetropia    • Irritable bowel syndrome    • Knee pain 03/22/2016     being evaluated for knee replacements.       • Malaise and fatigue    • Myofascial pain    • Nausea and vomiting    • Neck pain    • LJ (obstructive sleep apnea)    • Osteoarthritis    • Pain in wrist    • Polyp of intestine     large intestine   • Pure hypercholesterolemia    • Right upper quadrant pain    • Screening for hyperlipidemia    • Shoulder pain 09/02/2014    possible Rotator Cuff Tendinitis      • Spasm of back muscles    • Sprain of sacroiliac ligament    • Transient cerebral ischemia    • Type 2 diabetes mellitus    • Type II diabetes mellitus, uncontrolled    • Upper respiratory infection    • Vitamin D  deficiency        Past Surgical Hx:  Past Surgical History:   Procedure Laterality Date   • CARPAL TUNNEL RELEASE     • COLONOSCOPY     • COLONOSCOPY W/ POLYPECTOMY  06/10/2015    Colonoscopy remove polyps 90896 (1)      • ENDOSCOPY  06/10/2015    EGD w/ biopsy 11546 (1)      • INJECTION OF MEDICATION  09/02/2014    Drain/Inject Intermed Joint 20605 (1)      • TUBAL ABDOMINAL LIGATION      Tubal ligation (1)      • UPPER GASTROINTESTINAL ENDOSCOPY         Health Maintenance:  Health Maintenance   Topic Date Due   • PNEUMOCOCCAL VACCINE (19-64 MEDIUM RISK) (1 of 1 - PPSV23) 03/13/1972   • TDAP/TD VACCINES (1 - Tdap) 03/13/1972   • MAMMOGRAM  04/15/2017   • LIPID PANEL  04/15/2017   • ZOSTER VACCINE  04/15/2017   • DIABETIC FOOT EXAM  07/19/2017   • HEMOGLOBIN A1C  07/19/2017   • DIABETIC EYE EXAM  07/19/2017   • URINE MICROALBUMIN  07/19/2017   • INFLUENZA VACCINE  08/01/2017   • COLONOSCOPY  06/10/2025   • HEPATITIS C SCREENING  Completed       Current Meds:    Current Outpatient Prescriptions:   •  albuterol (PROVENTIL HFA;VENTOLIN HFA) 108 (90 BASE) MCG/ACT inhaler, Inhale 2 puffs Every 4 (Four) Hours As Needed for wheezing., Disp: , Rfl:   •  albuterol (PROVENTIL) (2.5 MG/3ML) 0.083% nebulizer solution, Take 2.5 mg by nebulization Every 4 (Four) Hours As Needed for wheezing., Disp: , Rfl:   •  baclofen (LIORESAL) 10 MG tablet, Take 10 mg by mouth., Disp: , Rfl:   •  clopidogrel (PLAVIX) 75 MG tablet, Take 75 mg by mouth Daily., Disp: , Rfl:   •  dicyclomine (BENTYL) 20 MG tablet, Take 1 tablet by mouth Every 6 (Six) Hours., Disp: 90 tablet, Rfl: 5  •  furosemide (LASIX) 40 MG tablet, Take 40 mg by mouth Daily., Disp: , Rfl:   •  glucose blood (FREESTYLE LITE) test strip, 1 each by Other route As Needed. Use as instructed, Disp: , Rfl:   •  glucose blood test strip, Test tid dx code 250.00 on insulin, Disp: , Rfl:   •  HYDROcodone-acetaminophen (NORCO) 7.5-325 MG per tablet, , Disp: , Rfl:   •   "HYDROcodone-acetaminophen (NORCO) 7.5-325 MG per tablet, Take 1 tablet by mouth 4 (Four) Times a Day for 30 days., Disp: 120 tablet, Rfl: 0  •  HYDROcodone-acetaminophen (NORCO) 7.5-325 MG per tablet, Take 1 tablet by mouth 4 (Four) Times a Day for 30 days., Disp: 120 tablet, Rfl: 0  •  insulin aspart prot-insulin aspart (NOVOLOG MIX 70/30) (70-30) 100 UNIT/ML injection, INJECT 25 UNITS INTO THE SKIN THREE TIMES A DAY BEFORE MEALS, Disp: , Rfl:   •  Insulin Syringe-Needle U-100 (GNP INSULIN SYRINGE) 31G X 5/16\" 0.3 ML misc, , Disp: , Rfl:   •  isosorbide mononitrate (IMDUR) 30 MG 24 hr tablet, Take 30 mg by mouth Daily., Disp: , Rfl:   •  Lancets (ACCU-CHEK MULTICLIX) lancets, Use to test three times a day, Disp: , Rfl:   •  lisinopril (PRINIVIL,ZESTRIL) 5 MG tablet, Take 10 mg by mouth Daily., Disp: , Rfl:   •  magnesium oxide (MAGOX) 400 (241.3 MG) MG tablet tablet, Take 400 mg by mouth Daily., Disp: , Rfl:   •  meloxicam (MOBIC) 15 MG tablet, Take 15 mg by mouth Daily., Disp: , Rfl:   •  metFORMIN (GLUCOPHAGE) 1000 MG tablet, Take 1,000 mg by mouth 2 (Two) Times a Day With Meals., Disp: , Rfl:   •  metoprolol succinate XL (TOPROL-XL) 25 MG 24 hr tablet, Take 12.5 mg by mouth Daily., Disp: , Rfl:   •  pantoprazole (PROTONIX) 40 MG EC tablet, Take 1 tablet by mouth Daily., Disp: 30 tablet, Rfl: 5  •  spironolactone (ALDACTONE) 25 MG tablet, , Disp: , Rfl: 11  •  TRUEPLUS INSULIN SYRINGE 31G X 5/16\" 0.5 ML misc, , Disp: , Rfl:   •  vitamin D (ERGOCALCIFEROL) 94418 UNITS capsule capsule, Take 50,000 Units by mouth 1 (One) Time Per Week., Disp: , Rfl:   •  acetaminophen (TYLENOL 8 HOUR) 650 MG 8 hr tablet, Take 1 tablet by mouth Every 8 (Eight) Hours As Needed for Mild Pain ., Disp: 60 tablet, Rfl: 1  •  atorvastatin (LIPITOR) 40 MG tablet, Take 1 tablet by mouth Daily., Disp: 30 tablet, Rfl: 10  •  buPROPion XL (WELLBUTRIN XL) 150 MG 24 hr tablet, Take 1 tablet by mouth Daily., Disp: 30 tablet, Rfl: " "1    Allergies:  Review of patient's allergies indicates no known allergies.    Family Hx:  Family History   Problem Relation Age of Onset   • Heart disease Other    • Bone cancer Other    • Lung cancer Other    • Throat cancer Other    • Cancer Other      Other   • Colon cancer Other      Colorectal Cancer   • Diabetes Other    • Hypertension Other         Social History:  Social History     Social History   • Marital status:      Spouse name: N/A   • Number of children: N/A   • Years of education: N/A     Occupational History   • Not on file.     Social History Main Topics   • Smoking status: Former Smoker     Quit date: 1996   • Smokeless tobacco: Never Used      Comment: non smoker for years   • Alcohol use No   • Drug use: No   • Sexual activity: Defer     Other Topics Concern   • Not on file     Social History Narrative       Review of Systems   Constitutional: Negative for activity change, appetite change, fatigue and fever.   HENT: Negative for ear pain and sore throat.    Eyes: Negative for pain and visual disturbance.   Respiratory: Negative for cough and shortness of breath.    Cardiovascular: Negative for chest pain and palpitations.   Gastrointestinal: Negative for abdominal pain and nausea.   Endocrine: Negative for cold intolerance and heat intolerance.   Genitourinary: Negative for difficulty urinating and dysuria.   Musculoskeletal: Negative for arthralgias and gait problem.   Skin: Negative for color change and rash.   Neurological: Negative for dizziness, weakness and headaches.   Hematological: Negative for adenopathy. Does not bruise/bleed easily.   Psychiatric/Behavioral: Negative for agitation, confusion and sleep disturbance.           Objective:     /74  Pulse 54  Ht 63\" (160 cm)  Wt 278 lb (126 kg)  SpO2 98%  BMI 49.25 kg/m2    Physical Exam   Constitutional: She is oriented to person, place, and time. She appears well-developed and well-nourished. No distress.   HENT: "   Head: Normocephalic and atraumatic.   Nose: Nose normal.   Eyes: Conjunctivae are normal. Right eye exhibits no discharge. Left eye exhibits no discharge.   Neck: Neck supple.   Cardiovascular: Normal rate, regular rhythm and normal heart sounds.  Exam reveals no gallop and no friction rub.    No murmur heard.  Pulmonary/Chest: Effort normal and breath sounds normal. No accessory muscle usage. No respiratory distress. She has no wheezes. She has no rales. She exhibits no tenderness.   Abdominal: Soft. Bowel sounds are normal. She exhibits no distension. There is no tenderness.   Musculoskeletal: She exhibits no edema or deformity.        Legs:  Neurological: She is alert and oriented to person, place, and time.   Skin: Skin is warm and dry. No rash noted. She is not diaphoretic. No erythema. No pallor.   Psychiatric: She has a normal mood and affect. Her behavior is normal.          Assessment/Plan:     Mila was seen today for diabetes and leg pain.    Diagnoses and all orders for this visit:    Acute pain of right knee  -     acetaminophen (TYLENOL 8 HOUR) 650 MG 8 hr tablet; Take 1 tablet by mouth Every 8 (Eight) Hours As Needed for Mild Pain .  -     XR Knee 1 or 2 View Right; Future    Need for hepatitis C screening test  -     Hepatitis panel, acute; Future    Pure hypercholesterolemia  -     atorvastatin (LIPITOR) 40 MG tablet; Take 1 tablet by mouth Daily.    Benign essential hypertension    Establishing care with new doctor, encounter for    Mild single current episode of major depressive disorder  -     buPROPion XL (WELLBUTRIN XL) 150 MG 24 hr tablet; Take 1 tablet by mouth Daily.          Follow-up:     Return in about 3 months (around 12/11/2017) for Recheck diabetes and HTN.      I discussed with the patient the diagnoses and orders from today's visit. All complaints addressed and all questions answered. Pt agrees with plan. Pt was told to call the clinic with any questions and to go the ER with  any emergent problems. Pt understood and agreed.      GOALS:  Management of chronic medical conditions.    Preventative:  Female Preventative: Last mammogram was 2016  Last PAP was unknown, patient encouraged to return for pap smear.  Vaccines:   Tetanus vaccine: unknown  Annual influenza vaccine: Will address in October   Pneumococcal vaccine: unknown  Hep B vaccine: unknown   Zoster vaccine:unknown    patient former smoker and quit 10 years ago  does not drink  eat more fruits and vegetables, increase physical activity, reduce fast food intake and plan meals    RISK SCORE: 3         This document has been electronically signed by Gabby Evans MD on September 14, 2017 2:03 PM

## 2017-09-13 LAB
HAV IGM SERPL QL IA: NEGATIVE
HBV CORE IGM SERPL QL IA: NEGATIVE
HBV SURFACE AG SERPL QL IA: NEGATIVE
HCV AB SER DONR QL: NEGATIVE

## 2017-09-15 NOTE — PROGRESS NOTES
I have seen this patient and discussed the case with resident and agree with the assessment and plan.  YARI Segundo M.D.

## 2017-10-04 DIAGNOSIS — F32.0 MILD SINGLE CURRENT EPISODE OF MAJOR DEPRESSIVE DISORDER (HCC): ICD-10-CM

## 2017-10-04 RX ORDER — BUPROPION HYDROCHLORIDE 150 MG/1
TABLET ORAL
Qty: 30 TABLET | Refills: 1 | OUTPATIENT
Start: 2017-10-04

## 2017-10-05 DIAGNOSIS — F32.0 MILD SINGLE CURRENT EPISODE OF MAJOR DEPRESSIVE DISORDER (HCC): ICD-10-CM

## 2017-10-05 RX ORDER — BUPROPION HYDROCHLORIDE 150 MG/1
150 TABLET ORAL DAILY
Qty: 30 TABLET | Refills: 4 | Status: SHIPPED | OUTPATIENT
Start: 2017-10-05 | End: 2019-04-09 | Stop reason: ALTCHOICE

## 2017-10-10 ENCOUNTER — OFFICE VISIT (OUTPATIENT)
Dept: PODIATRY | Facility: CLINIC | Age: 62
End: 2017-10-10

## 2017-10-10 VITALS
SYSTOLIC BLOOD PRESSURE: 141 MMHG | HEART RATE: 58 BPM | HEIGHT: 63 IN | WEIGHT: 277.2 LBS | OXYGEN SATURATION: 96 % | DIASTOLIC BLOOD PRESSURE: 58 MMHG | BODY MASS INDEX: 49.12 KG/M2

## 2017-10-10 DIAGNOSIS — E11.42 TYPE 2 DIABETES MELLITUS WITH PERIPHERAL NEUROPATHY (HCC): ICD-10-CM

## 2017-10-10 DIAGNOSIS — M79.674 TOE PAIN, BILATERAL: Primary | ICD-10-CM

## 2017-10-10 DIAGNOSIS — M79.675 TOE PAIN, BILATERAL: Primary | ICD-10-CM

## 2017-10-10 PROCEDURE — 99213 OFFICE O/P EST LOW 20 MIN: CPT | Performed by: PODIATRIST

## 2017-10-10 NOTE — PROGRESS NOTES
Mila [place]  1955  62 y.o. female  Maribel-06/2017  BS-140  per pt.    Patient presents today with a complaint of bilateral great toenail issue.    10/10/17    Chief Complaint   Patient presents with   • Left Foot - Pain, nail issue   • Right Foot - Pain, nail issue           History of Present Illness    Patient presents With chief complaint of great toenail issues bilateral. patient states that her toes still throbbing and bleed when she wears closed toe shoes.  She has had bilateral partial permanent nail avulsions to the hallux back in July.  She is concerned because she is diabetic.  She has no other pedal complaints.       Past Medical History:   Diagnosis Date   • Acute bronchitis    • Anxiety     Anxiety state      • Arthropathy of lumbar facet joint    • Asthma    • Astigmatism    • At risk for adverse drug event     Adverse drug event resulting from treatment of disorder      • Back ache    • Benign essential hypertension    • Cholecystitis     mild on ultrasound      • Chronic back pain    • Contact dermatitis    • COPD (chronic obstructive pulmonary disease)    • Cough    • Depression     Depressive disorder, not elsewhere classified      • Diabetes mellitus     no retinopathy      • Diabetes mellitus without complication     type II or unspecified type, not stated as uncontrolled      • Diarrhea    • Drug therapy     Other long term (current) drug therapy      • Dyspnea    • Electrocardiogram abnormal    • Epigastric pain    • Episodic mood disorder     Unspecified    • Esophageal reflux    • Esophagitis     grade II   • Foot pain     VS SMALL PHALANX AVULSION      • Generalized abdominal pain    • Generalized anxiety disorder    • History of colon polyps    • Hypermetropia    • Irritable bowel syndrome    • Knee pain 03/22/2016     being evaluated for knee replacements.       • Malaise and fatigue    • Myofascial pain    • Nausea and vomiting    • Neck pain    • LJ (obstructive sleep  apnea)    • Osteoarthritis    • Pain in wrist    • Polyp of intestine     large intestine   • Pure hypercholesterolemia    • Right upper quadrant pain    • Screening for hyperlipidemia    • Shoulder pain 09/02/2014    possible Rotator Cuff Tendinitis      • Spasm of back muscles    • Sprain of sacroiliac ligament    • Transient cerebral ischemia    • Type 2 diabetes mellitus    • Type II diabetes mellitus, uncontrolled    • Upper respiratory infection    • Vitamin D deficiency          Past Surgical History:   Procedure Laterality Date   • CARPAL TUNNEL RELEASE     • COLONOSCOPY     • COLONOSCOPY W/ POLYPECTOMY  06/10/2015    Colonoscopy remove polyps 64160 (1)      • ENDOSCOPY  06/10/2015    EGD w/ biopsy 70657 (1)      • INJECTION OF MEDICATION  09/02/2014    Drain/Inject Intermed Joint 20605 (1)      • TUBAL ABDOMINAL LIGATION      Tubal ligation (1)      • UPPER GASTROINTESTINAL ENDOSCOPY           Family History   Problem Relation Age of Onset   • Heart disease Other    • Bone cancer Other    • Lung cancer Other    • Throat cancer Other    • Cancer Other      Other   • Colon cancer Other      Colorectal Cancer   • Diabetes Other    • Hypertension Other          Social History     Social History   • Marital status:      Spouse name: N/A   • Number of children: N/A   • Years of education: N/A     Occupational History   • Not on file.     Social History Main Topics   • Smoking status: Former Smoker     Quit date: 1996   • Smokeless tobacco: Never Used      Comment: non smoker for years   • Alcohol use No   • Drug use: No   • Sexual activity: Defer     Other Topics Concern   • Not on file     Social History Narrative         Current Outpatient Prescriptions   Medication Sig Dispense Refill   • acetaminophen (TYLENOL 8 HOUR) 650 MG 8 hr tablet Take 1 tablet by mouth Every 8 (Eight) Hours As Needed for Mild Pain . 60 tablet 1   • albuterol (PROVENTIL HFA;VENTOLIN HFA) 108 (90 BASE) MCG/ACT inhaler Inhale 2  "puffs Every 4 (Four) Hours As Needed for wheezing.     • albuterol (PROVENTIL) (2.5 MG/3ML) 0.083% nebulizer solution Take 2.5 mg by nebulization Every 4 (Four) Hours As Needed for wheezing.     • atorvastatin (LIPITOR) 40 MG tablet Take 1 tablet by mouth Daily. 30 tablet 10   • baclofen (LIORESAL) 10 MG tablet Take 10 mg by mouth.     • buPROPion XL (WELLBUTRIN XL) 150 MG 24 hr tablet Take 1 tablet by mouth Daily. 30 tablet 4   • clopidogrel (PLAVIX) 75 MG tablet Take 75 mg by mouth Daily.     • dicyclomine (BENTYL) 20 MG tablet Take 1 tablet by mouth Every 6 (Six) Hours. 90 tablet 5   • furosemide (LASIX) 40 MG tablet Take 40 mg by mouth Daily.     • glucose blood (FREESTYLE LITE) test strip 1 each by Other route As Needed. Use as instructed     • glucose blood test strip Test tid dx code 250.00 on insulin     • HYDROcodone-acetaminophen (NORCO) 7.5-325 MG per tablet      • insulin aspart prot-insulin aspart (NOVOLOG MIX 70/30) (70-30) 100 UNIT/ML injection INJECT 25 UNITS INTO THE SKIN THREE TIMES A DAY BEFORE MEALS     • Insulin Syringe-Needle U-100 (GNP INSULIN SYRINGE) 31G X 5/16\" 0.3 ML misc      • isosorbide mononitrate (IMDUR) 30 MG 24 hr tablet Take 30 mg by mouth Daily.     • Lancets (ACCU-CHEK MULTICLIX) lancets Use to test three times a day     • lisinopril (PRINIVIL,ZESTRIL) 5 MG tablet Take 10 mg by mouth Daily.     • magnesium oxide (MAGOX) 400 (241.3 MG) MG tablet tablet Take 400 mg by mouth Daily.     • meloxicam (MOBIC) 15 MG tablet Take 15 mg by mouth Daily.     • metFORMIN (GLUCOPHAGE) 1000 MG tablet Take 1,000 mg by mouth 2 (Two) Times a Day With Meals.     • metoprolol succinate XL (TOPROL-XL) 25 MG 24 hr tablet Take 12.5 mg by mouth Daily.     • pantoprazole (PROTONIX) 40 MG EC tablet Take 1 tablet by mouth Daily. 30 tablet 5   • spironolactone (ALDACTONE) 25 MG tablet   11   • TRUEPLUS INSULIN SYRINGE 31G X 5/16\" 0.5 ML misc      • vitamin D (ERGOCALCIFEROL) 97224 UNITS capsule capsule Take " "50,000 Units by mouth 1 (One) Time Per Week.       No current facility-administered medications for this visit.          OBJECTIVE    /58  Pulse 58  Ht 63\" (160 cm)  Wt 277 lb 3.2 oz (126 kg)  SpO2 96%  BMI 49.1 kg/m2      Review of Systems   Constitutional: Negative for chills and fever.   Cardiovascular: Negative for chest pain.   Gastrointestinal: Negative for constipation, diarrhea, nausea and vomiting.   Skin: Negative for wound  Musculoskeletal:Great toe pain    Constitutional: well developed, well nourished    HEENT: Normocephalic and atraumatic, normal hearing    Respiratory: Non labored respirations noted    Cardiovascular:    DP/PT pulses palpable    CFT brisk  to all digits  Skin temp is warm to warm from proximal tibia to distal digits  Pedal hair growth present.     Musculoskeletal:  Muscle strength is 5/5 for all muscle groups tested   ROM of the 1st MTP is full without pain or crepitus  ROM of the MTJ is full without pain or crepitus    ROM of the STJ is full without pain or crepitus    ROM of the ankle joint is full without pain or crepitus     Rectus foot type     Dermatological:    bilateral hallux nails are absent on the lateral borders. No signs of infection.  No erythema or edema noted.  No drainage noted.  Hallux nail plates hallucinations from underlying nailbed bilateral   No subcutaneous nodules or masses noted        Neurological:   Protective sensation decreased  Sensation intact to light touch    DTR intact    Psychiatric: A&O x 3 with normal mood and affect. NAD.        Procedures        ASSESSMENT AND PLAN    Mila was seen today for pain, nail issue, pain and nail issue.    Diagnoses and all orders for this visit:    Toe pain, bilateral    Type 2 diabetes mellitus with peripheral neuropathy    - No acute signs of infection noted.  Educated patient on potential treatment options including total permanent nail avulsions versus total temporary nail avulsions to the hallux " nails.  Patient refused at this time  - All questions were answered   - RTC 2 weeks          This document has been electronically signed by Jozef Rubio DPM on October 11, 2017 7:07 AM     10/11/2017  7:07 AM

## 2017-10-24 ENCOUNTER — OFFICE VISIT (OUTPATIENT)
Dept: PODIATRY | Facility: CLINIC | Age: 62
End: 2017-10-24

## 2017-10-24 VITALS — WEIGHT: 277 LBS | HEART RATE: 55 BPM | BODY MASS INDEX: 49.08 KG/M2 | HEIGHT: 63 IN | OXYGEN SATURATION: 97 %

## 2017-10-24 DIAGNOSIS — B35.1 ONYCHOMYCOSIS: Primary | ICD-10-CM

## 2017-10-24 PROCEDURE — 11750 EXCISION NAIL&NAIL MATRIX: CPT | Performed by: PODIATRIST

## 2017-10-24 NOTE — PROGRESS NOTES
Mila Funk [place]  1955  62 y.o. female    Patient presents today for bilateral great toenail removal.    10/24/17      Chief Complaint   Patient presents with   • Left Foot - Follow-up, nail issue   • Right Foot - Follow-up, nail issue           History of Present Illness    Patient presents with chief complaint of great toenail issues bilateral. patient states that her great toe nails are thickened, discolored and painful in shoe gear..  She has had bilateral partial permanent nail avulsions to the hallux back in July.  She states that she would like the entire nails taken off permanently.  She has no other pedal complaints.       Past Medical History:   Diagnosis Date   • Acute bronchitis    • Anxiety     Anxiety state      • Arthropathy of lumbar facet joint    • Asthma    • Astigmatism    • At risk for adverse drug event     Adverse drug event resulting from treatment of disorder      • Back ache    • Benign essential hypertension    • Cholecystitis     mild on ultrasound      • Chronic back pain    • Contact dermatitis    • COPD (chronic obstructive pulmonary disease)    • Cough    • Depression     Depressive disorder, not elsewhere classified      • Diabetes mellitus     no retinopathy      • Diabetes mellitus without complication     type II or unspecified type, not stated as uncontrolled      • Diarrhea    • Drug therapy     Other long term (current) drug therapy      • Dyspnea    • Electrocardiogram abnormal    • Epigastric pain    • Episodic mood disorder     Unspecified    • Esophageal reflux    • Esophagitis     grade II   • Foot pain     VS SMALL PHALANX AVULSION      • Generalized abdominal pain    • Generalized anxiety disorder    • History of colon polyps    • Hypermetropia    • Irritable bowel syndrome    • Knee pain 03/22/2016     being evaluated for knee replacements.       • Malaise and fatigue    • Myofascial pain    • Nausea and vomiting    • Neck pain    • Onychomycosis    • LJ  (obstructive sleep apnea)    • Osteoarthritis    • Pain in wrist    • Polyp of intestine     large intestine   • Pure hypercholesterolemia    • Right upper quadrant pain    • Screening for hyperlipidemia    • Shoulder pain 09/02/2014    possible Rotator Cuff Tendinitis      • Spasm of back muscles    • Sprain of sacroiliac ligament    • Transient cerebral ischemia    • Type 2 diabetes mellitus    • Type II diabetes mellitus, uncontrolled    • Upper respiratory infection    • Vitamin D deficiency          Past Surgical History:   Procedure Laterality Date   • CARPAL TUNNEL RELEASE     • COLONOSCOPY     • COLONOSCOPY W/ POLYPECTOMY  06/10/2015    Colonoscopy remove polyps 77948 (1)      • ENDOSCOPY  06/10/2015    EGD w/ biopsy 00934 (1)      • INJECTION OF MEDICATION  09/02/2014    Drain/Inject Intermed Joint 20605 (1)      • TUBAL ABDOMINAL LIGATION      Tubal ligation (1)      • UPPER GASTROINTESTINAL ENDOSCOPY           Family History   Problem Relation Age of Onset   • Heart disease Other    • Bone cancer Other    • Lung cancer Other    • Throat cancer Other    • Cancer Other      Other   • Colon cancer Other      Colorectal Cancer   • Diabetes Other    • Hypertension Other          Social History     Social History   • Marital status:      Spouse name: N/A   • Number of children: N/A   • Years of education: N/A     Occupational History   • Not on file.     Social History Main Topics   • Smoking status: Former Smoker     Quit date: 1996   • Smokeless tobacco: Never Used      Comment: non smoker for years   • Alcohol use No   • Drug use: No   • Sexual activity: Defer     Other Topics Concern   • Not on file     Social History Narrative         Current Outpatient Prescriptions   Medication Sig Dispense Refill   • acetaminophen (TYLENOL 8 HOUR) 650 MG 8 hr tablet Take 1 tablet by mouth Every 8 (Eight) Hours As Needed for Mild Pain . 60 tablet 1   • albuterol (PROVENTIL HFA;VENTOLIN HFA) 108 (90 BASE) MCG/ACT  "inhaler Inhale 2 puffs Every 4 (Four) Hours As Needed for wheezing.     • albuterol (PROVENTIL) (2.5 MG/3ML) 0.083% nebulizer solution Take 2.5 mg by nebulization Every 4 (Four) Hours As Needed for wheezing.     • atorvastatin (LIPITOR) 40 MG tablet Take 1 tablet by mouth Daily. 30 tablet 10   • baclofen (LIORESAL) 10 MG tablet Take 10 mg by mouth.     • buPROPion XL (WELLBUTRIN XL) 150 MG 24 hr tablet Take 1 tablet by mouth Daily. 30 tablet 4   • clopidogrel (PLAVIX) 75 MG tablet Take 75 mg by mouth Daily.     • dicyclomine (BENTYL) 20 MG tablet Take 1 tablet by mouth Every 6 (Six) Hours. 90 tablet 5   • furosemide (LASIX) 40 MG tablet Take 40 mg by mouth Daily.     • glucose blood (FREESTYLE LITE) test strip 1 each by Other route As Needed. Use as instructed     • glucose blood test strip Test tid dx code 250.00 on insulin     • HYDROcodone-acetaminophen (NORCO) 7.5-325 MG per tablet      • insulin aspart prot-insulin aspart (NOVOLOG MIX 70/30) (70-30) 100 UNIT/ML injection INJECT 25 UNITS INTO THE SKIN THREE TIMES A DAY BEFORE MEALS     • Insulin Syringe-Needle U-100 (GNP INSULIN SYRINGE) 31G X 5/16\" 0.3 ML misc      • isosorbide mononitrate (IMDUR) 30 MG 24 hr tablet Take 30 mg by mouth Daily.     • Lancets (ACCU-CHEK MULTICLIX) lancets Use to test three times a day     • lisinopril (PRINIVIL,ZESTRIL) 5 MG tablet Take 10 mg by mouth Daily.     • magnesium oxide (MAGOX) 400 (241.3 MG) MG tablet tablet Take 400 mg by mouth Daily.     • meloxicam (MOBIC) 15 MG tablet Take 15 mg by mouth Daily.     • metFORMIN (GLUCOPHAGE) 1000 MG tablet Take 1,000 mg by mouth 2 (Two) Times a Day With Meals.     • metoprolol succinate XL (TOPROL-XL) 25 MG 24 hr tablet Take 12.5 mg by mouth Daily.     • pantoprazole (PROTONIX) 40 MG EC tablet Take 1 tablet by mouth Daily. 30 tablet 5   • spironolactone (ALDACTONE) 25 MG tablet   11   • TRUEPLUS INSULIN SYRINGE 31G X 5/16\" 0.5 ML misc      • vitamin D (ERGOCALCIFEROL) 91637 UNITS " "capsule capsule Take 50,000 Units by mouth 1 (One) Time Per Week.       No current facility-administered medications for this visit.          OBJECTIVE    Pulse 55  Ht 63\" (160 cm)  Wt 277 lb (126 kg)  SpO2 97%  BMI 49.07 kg/m2      Review of Systems   Constitutional: Negative for chills and fever.   Cardiovascular: Negative for chest pain.   Gastrointestinal: Negative for constipation, diarrhea, nausea and vomiting.   Skin: Negative for wound  Musculoskeletal:Great toe pain    Constitutional: well developed, well nourished    HEENT: Normocephalic and atraumatic, normal hearing    Respiratory: Non labored respirations noted    Cardiovascular:    DP/PT pulses palpable    CFT brisk  to all digits  Skin temp is warm to warm from proximal tibia to distal digits  Pedal hair growth present.     Musculoskeletal:  Muscle strength is 5/5 for all muscle groups tested   ROM of the 1st MTP is full without pain or crepitus  ROM of the MTJ is full without pain or crepitus    ROM of the STJ is full without pain or crepitus    ROM of the ankle joint is full without pain or crepitus     Rectus foot type     Dermatological:    bilateral hallux nails are absent on the lateral borders. No signs of infection.  No erythema or edema noted.  No drainage noted.  Hallux nail plates are loosened from underlying nailbed bilateral with subungual debris and discoloration.  No subcutaneous nodules or masses noted        Neurological:   Protective sensation decreased  Sensation intact to light touch    DTR intact    Psychiatric: A&O x 3 with normal mood and affect. NAD.        Nail Removal  Date/Time: 10/24/2017 11:14 AM  Performed by: MANDY SAMSON  Authorized by: MANDY SAMSON   Consent: Verbal consent obtained. Written consent obtained.  Risks and benefits: risks, benefits and alternatives were discussed  Consent given by: patient  Patient understanding: patient states understanding of the procedure being performed  Patient identity " confirmed: verbally with patient  Nail removal extremity: bilateral hallux nails.  Anesthesia: digital block    Anesthesia:  Local Anesthetic: lidocaine 2% without epinephrine  Preparation: skin prepped with Betadine  Amount removed: complete (Nail plates were  from underlying nailbed with blunt dissection and removed with nail nippers and a hemostat)  Nail matrix removed: complete (Phenol)  Dressing: antibiotic ointment and dressing applied  Patient tolerance: Patient tolerated the procedure well with no immediate complications              ASSESSMENT AND PLAN    Mila was seen today for follow-up, nail issue, follow-up and nail issue.    Diagnoses and all orders for this visit:    Onychomycosis    - Diagnosis and treatment of fungal toenails was discussed with the patient including nail biopsy and treatment with oral antifungals versus avulsions both temporary and permanent versus regular debridements.  - Total permanent nail avulsions to bilateral hallux nails performed  - Dispensed aftercare instruction sheet  - All questions were answered   - RTC 2 weeks          This document has been electronically signed by Jozef Rubio DPM on October 24, 2017 11:12 AM     10/24/2017  11:12 AM

## 2017-10-31 ENCOUNTER — OFFICE VISIT (OUTPATIENT)
Dept: PAIN MEDICINE | Facility: CLINIC | Age: 62
End: 2017-10-31

## 2017-10-31 ENCOUNTER — APPOINTMENT (OUTPATIENT)
Dept: LAB | Facility: HOSPITAL | Age: 62
End: 2017-10-31

## 2017-10-31 VITALS
BODY MASS INDEX: 49.72 KG/M2 | DIASTOLIC BLOOD PRESSURE: 52 MMHG | HEIGHT: 63 IN | WEIGHT: 280.6 LBS | SYSTOLIC BLOOD PRESSURE: 122 MMHG

## 2017-10-31 DIAGNOSIS — M79.18 MYOFASCIAL PAIN: ICD-10-CM

## 2017-10-31 DIAGNOSIS — M47.817 LUMBOSACRAL SPONDYLOSIS WITHOUT MYELOPATHY: Primary | ICD-10-CM

## 2017-10-31 DIAGNOSIS — Z79.899 HIGH RISK MEDICATIONS (NOT ANTICOAGULANTS) LONG-TERM USE: ICD-10-CM

## 2017-10-31 DIAGNOSIS — E66.01 MORBID OBESITY (HCC): ICD-10-CM

## 2017-10-31 PROCEDURE — 80307 DRUG TEST PRSMV CHEM ANLYZR: CPT | Performed by: PAIN MEDICINE

## 2017-10-31 PROCEDURE — G0481 DRUG TEST DEF 8-14 CLASSES: HCPCS | Performed by: PAIN MEDICINE

## 2017-10-31 PROCEDURE — 99214 OFFICE O/P EST MOD 30 MIN: CPT | Performed by: PAIN MEDICINE

## 2017-10-31 RX ORDER — HYDROCODONE BITARTRATE AND ACETAMINOPHEN 7.5; 325 MG/1; MG/1
1 TABLET ORAL 4 TIMES DAILY
Qty: 120 TABLET | Refills: 0 | Status: SHIPPED | OUTPATIENT
Start: 2017-10-31 | End: 2017-11-30

## 2017-10-31 NOTE — PROGRESS NOTES
Mila Fisher is a 62 y.o. female.   1955    HPI:   Location: neck and lower back  Quality: aching, sharp and dull  Severity: 8/10  Timing: constant  Alleviating: pain medication  Aggravating: increased activity     Patient reports that the opioid medication still provides her good relief and allows more activity than she would have without the opioid medication.  She denies side effects.        The following portions of the patient's history were reviewed by me and updated as appropriate: allergies, current medications, past family history, past medical history, past social history, past surgical history and problem list.    Past Medical History:   Diagnosis Date   • Acute bronchitis    • Anxiety     Anxiety state      • Arthropathy of lumbar facet joint    • Asthma    • Astigmatism    • At risk for adverse drug event     Adverse drug event resulting from treatment of disorder      • Back ache    • Benign essential hypertension    • Cholecystitis     mild on ultrasound      • Chronic back pain    • Contact dermatitis    • COPD (chronic obstructive pulmonary disease)    • Cough    • Depression     Depressive disorder, not elsewhere classified      • Diabetes mellitus     no retinopathy      • Diabetes mellitus without complication     type II or unspecified type, not stated as uncontrolled      • Diarrhea    • Drug therapy     Other long term (current) drug therapy      • Dyspnea    • Electrocardiogram abnormal    • Epigastric pain    • Episodic mood disorder     Unspecified    • Esophageal reflux    • Esophagitis     grade II   • Foot pain     VS SMALL PHALANX AVULSION      • Generalized abdominal pain    • Generalized anxiety disorder    • History of colon polyps    • Hypermetropia    • Irritable bowel syndrome    • Knee pain 03/22/2016     being evaluated for knee replacements.       • Malaise and fatigue    • Myofascial pain    • Nausea and vomiting    • Neck pain    • Onychomycosis    • LJ  (obstructive sleep apnea)    • Osteoarthritis    • Pain in wrist    • Polyp of intestine     large intestine   • Pure hypercholesterolemia    • Right upper quadrant pain    • Screening for hyperlipidemia    • Shoulder pain 09/02/2014    possible Rotator Cuff Tendinitis      • Spasm of back muscles    • Sprain of sacroiliac ligament    • Transient cerebral ischemia    • Type 2 diabetes mellitus    • Type II diabetes mellitus, uncontrolled    • Upper respiratory infection    • Vitamin D deficiency        Social History     Social History   • Marital status:      Spouse name: N/A   • Number of children: N/A   • Years of education: N/A     Occupational History   • Not on file.     Social History Main Topics   • Smoking status: Former Smoker     Quit date: 1996   • Smokeless tobacco: Never Used      Comment: non smoker for years   • Alcohol use No   • Drug use: No   • Sexual activity: Defer     Other Topics Concern   • Not on file     Social History Narrative       Family History   Problem Relation Age of Onset   • Heart disease Other    • Bone cancer Other    • Lung cancer Other    • Throat cancer Other    • Cancer Other      Other   • Colon cancer Other      Colorectal Cancer   • Diabetes Other    • Hypertension Other          Current Outpatient Prescriptions:   •  acetaminophen (TYLENOL 8 HOUR) 650 MG 8 hr tablet, Take 1 tablet by mouth Every 8 (Eight) Hours As Needed for Mild Pain ., Disp: 60 tablet, Rfl: 1  •  albuterol (PROVENTIL HFA;VENTOLIN HFA) 108 (90 BASE) MCG/ACT inhaler, Inhale 2 puffs Every 4 (Four) Hours As Needed for wheezing., Disp: , Rfl:   •  albuterol (PROVENTIL) (2.5 MG/3ML) 0.083% nebulizer solution, Take 2.5 mg by nebulization Every 4 (Four) Hours As Needed for wheezing., Disp: , Rfl:   •  atorvastatin (LIPITOR) 40 MG tablet, Take 1 tablet by mouth Daily., Disp: 30 tablet, Rfl: 10  •  baclofen (LIORESAL) 10 MG tablet, Take 10 mg by mouth., Disp: , Rfl:   •  buPROPion XL (WELLBUTRIN XL) 150  "MG 24 hr tablet, Take 1 tablet by mouth Daily., Disp: 30 tablet, Rfl: 4  •  clopidogrel (PLAVIX) 75 MG tablet, Take 75 mg by mouth Daily., Disp: , Rfl:   •  dicyclomine (BENTYL) 20 MG tablet, Take 1 tablet by mouth Every 6 (Six) Hours., Disp: 90 tablet, Rfl: 5  •  furosemide (LASIX) 40 MG tablet, Take 40 mg by mouth Daily., Disp: , Rfl:   •  glucose blood (FREESTYLE LITE) test strip, 1 each by Other route As Needed. Use as instructed, Disp: , Rfl:   •  glucose blood test strip, Test tid dx code 250.00 on insulin, Disp: , Rfl:   •  HYDROcodone-acetaminophen (NORCO) 7.5-325 MG per tablet, , Disp: , Rfl:   •  insulin aspart prot-insulin aspart (NOVOLOG MIX 70/30) (70-30) 100 UNIT/ML injection, INJECT 25 UNITS INTO THE SKIN THREE TIMES A DAY BEFORE MEALS, Disp: , Rfl:   •  Insulin Syringe-Needle U-100 (GNP INSULIN SYRINGE) 31G X 5/16\" 0.3 ML misc, , Disp: , Rfl:   •  isosorbide mononitrate (IMDUR) 30 MG 24 hr tablet, Take 30 mg by mouth Daily., Disp: , Rfl:   •  Lancets (ACCU-CHEK MULTICLIX) lancets, Use to test three times a day, Disp: , Rfl:   •  lisinopril (PRINIVIL,ZESTRIL) 5 MG tablet, Take 10 mg by mouth Daily., Disp: , Rfl:   •  magnesium oxide (MAGOX) 400 (241.3 MG) MG tablet tablet, Take 400 mg by mouth Daily., Disp: , Rfl:   •  meloxicam (MOBIC) 15 MG tablet, Take 15 mg by mouth Daily., Disp: , Rfl:   •  metFORMIN (GLUCOPHAGE) 1000 MG tablet, Take 1,000 mg by mouth 2 (Two) Times a Day With Meals., Disp: , Rfl:   •  metoprolol succinate XL (TOPROL-XL) 25 MG 24 hr tablet, Take 12.5 mg by mouth Daily., Disp: , Rfl:   •  pantoprazole (PROTONIX) 40 MG EC tablet, Take 1 tablet by mouth Daily., Disp: 30 tablet, Rfl: 5  •  spironolactone (ALDACTONE) 25 MG tablet, , Disp: , Rfl: 11  •  TRUEPLUS INSULIN SYRINGE 31G X 5/16\" 0.5 ML misc, , Disp: , Rfl:   •  vitamin D (ERGOCALCIFEROL) 73880 UNITS capsule capsule, Take 50,000 Units by mouth 1 (One) Time Per Week., Disp: , Rfl:   •  HYDROcodone-acetaminophen (NORCO) 7.5-325 " MG per tablet, Take 1 tablet by mouth 4 (Four) Times a Day for 30 days., Disp: 120 tablet, Rfl: 0  •  HYDROcodone-acetaminophen (NORCO) 7.5-325 MG per tablet, Take 1 tablet by mouth 4 (Four) Times a Day for 30 days., Disp: 120 tablet, Rfl: 0    No Known Allergies    Review of Systems   Musculoskeletal: Positive for back pain (lower) and neck pain.   All other systems reviewed and are negative.    All systems reviewed and negative except for above.    Physical Exam   Constitutional: She appears well-developed and well-nourished. No distress.   Musculoskeletal:        Lumbar back: She exhibits decreased range of motion (ext to about 5 deg.  flexion to 30 deg.).   Neurological: She is alert. Gait (antalgic) abnormal.   Psychiatric: She has a normal mood and affect. Her behavior is normal. Judgment normal.       Mila was seen today for neck pain and back pain.    Diagnoses and all orders for this visit:    Lumbosacral spondylosis without myelopathy  -     ToxASSURE Select 13 (MW) - Urine, Clean Catch    Myofascial pain  -     ToxASSURE Select 13 (MW) - Urine, Clean Catch    High risk medications (not anticoagulants) long-term use  -     ToxASSURE Select 13 (MW) - Urine, Clean Catch    Morbid obesity  -     ToxASSURE Select 13 (MW) - Urine, Clean Catch    Other orders  -     HYDROcodone-acetaminophen (NORCO) 7.5-325 MG per tablet; Take 1 tablet by mouth 4 (Four) Times a Day for 30 days.  -     HYDROcodone-acetaminophen (NORCO) 7.5-325 MG per tablet; Take 1 tablet by mouth 4 (Four) Times a Day for 30 days.        Medication: Patient reports no negative side effects, Patient reports appropriate usage and storage habits, Patient's opioid provides enough relief to be more active and perform activities of daily living with less discomfort. and Refill opioid medication as above.    Interventional: none at this time.  Chronic anticoagulation.    Rehab: none at this time    Behavioral: No aberrant behavior noted. BHAVYA  Report #[id number]  was reviewed and is consistent with stated history    Urine drug screen Ordered today to test for drugs of abuse and prescribed medications          This document has been electronically signed by Bryec Edmonds MD on October 31, 2017 11:17 AM

## 2017-11-07 ENCOUNTER — OFFICE VISIT (OUTPATIENT)
Dept: PODIATRY | Facility: CLINIC | Age: 62
End: 2017-11-07

## 2017-11-07 VITALS — HEIGHT: 63 IN | WEIGHT: 280 LBS | BODY MASS INDEX: 49.61 KG/M2

## 2017-11-07 DIAGNOSIS — M79.675 TOE PAIN, BILATERAL: ICD-10-CM

## 2017-11-07 DIAGNOSIS — E11.42 TYPE 2 DIABETES MELLITUS WITH PERIPHERAL NEUROPATHY (HCC): ICD-10-CM

## 2017-11-07 DIAGNOSIS — M79.674 TOE PAIN, BILATERAL: ICD-10-CM

## 2017-11-07 DIAGNOSIS — B35.1 ONYCHOMYCOSIS: Primary | ICD-10-CM

## 2017-11-07 LAB — CONV REPORT SUMMARY: NORMAL

## 2017-11-07 PROCEDURE — 99212 OFFICE O/P EST SF 10 MIN: CPT | Performed by: PODIATRIST

## 2017-11-07 NOTE — PROGRESS NOTES
Mila Fisher  1955  62 y.o. female    Patient presents today for follow-up of bilateral great toenail removal.    11/07/17      Chief Complaint   Patient presents with   • Left Foot - Follow-up   • Right Foot - Follow-up           History of Present Illness    Patient presents For follow-up of her bilateral great toenail avulsions.  She has been soaking and dressing the toes as instructed.  She has moderate discomfort.  She has no new pedal complaints.      Past Medical History:   Diagnosis Date   • Acute bronchitis    • Anxiety     Anxiety state      • Arthropathy of lumbar facet joint    • Asthma    • Astigmatism    • At risk for adverse drug event     Adverse drug event resulting from treatment of disorder      • Back ache    • Benign essential hypertension    • Cholecystitis     mild on ultrasound      • Chronic back pain    • Contact dermatitis    • COPD (chronic obstructive pulmonary disease)    • Cough    • Depression     Depressive disorder, not elsewhere classified      • Diabetes mellitus     no retinopathy      • Diabetes mellitus without complication     type II or unspecified type, not stated as uncontrolled      • Diarrhea    • Drug therapy     Other long term (current) drug therapy      • Dyspnea    • Electrocardiogram abnormal    • Epigastric pain    • Episodic mood disorder     Unspecified    • Esophageal reflux    • Esophagitis     grade II   • Foot pain     VS SMALL PHALANX AVULSION      • Generalized abdominal pain    • Generalized anxiety disorder    • History of colon polyps    • Hypermetropia    • Irritable bowel syndrome    • Knee pain 03/22/2016     being evaluated for knee replacements.       • Malaise and fatigue    • Myofascial pain    • Nausea and vomiting    • Neck pain    • Onychomycosis    • LJ (obstructive sleep apnea)    • Osteoarthritis    • Pain in wrist    • Polyp of intestine     large intestine   • Pure hypercholesterolemia    • Right upper quadrant pain    •  Screening for hyperlipidemia    • Shoulder pain 09/02/2014    possible Rotator Cuff Tendinitis      • Spasm of back muscles    • Sprain of sacroiliac ligament    • Transient cerebral ischemia    • Type 2 diabetes mellitus    • Type II diabetes mellitus, uncontrolled    • Upper respiratory infection    • Vitamin D deficiency          Past Surgical History:   Procedure Laterality Date   • CARPAL TUNNEL RELEASE     • COLONOSCOPY     • COLONOSCOPY W/ POLYPECTOMY  06/10/2015    Colonoscopy remove polyps 31137 (1)      • ENDOSCOPY  06/10/2015    EGD w/ biopsy 66385 (1)      • INJECTION OF MEDICATION  09/02/2014    Drain/Inject Intermed Joint 20605 (1)      • TUBAL ABDOMINAL LIGATION      Tubal ligation (1)      • UPPER GASTROINTESTINAL ENDOSCOPY           Family History   Problem Relation Age of Onset   • Heart disease Other    • Bone cancer Other    • Lung cancer Other    • Throat cancer Other    • Cancer Other      Other   • Colon cancer Other      Colorectal Cancer   • Diabetes Other    • Hypertension Other          Social History     Social History   • Marital status:      Spouse name: N/A   • Number of children: N/A   • Years of education: N/A     Occupational History   • Not on file.     Social History Main Topics   • Smoking status: Former Smoker     Quit date: 1996   • Smokeless tobacco: Never Used      Comment: non smoker for years   • Alcohol use No   • Drug use: No   • Sexual activity: Defer     Other Topics Concern   • Not on file     Social History Narrative         Current Outpatient Prescriptions   Medication Sig Dispense Refill   • acetaminophen (TYLENOL 8 HOUR) 650 MG 8 hr tablet Take 1 tablet by mouth Every 8 (Eight) Hours As Needed for Mild Pain . 60 tablet 1   • albuterol (PROVENTIL HFA;VENTOLIN HFA) 108 (90 BASE) MCG/ACT inhaler Inhale 2 puffs Every 4 (Four) Hours As Needed for wheezing.     • albuterol (PROVENTIL) (2.5 MG/3ML) 0.083% nebulizer solution Take 2.5 mg by nebulization Every 4  "(Four) Hours As Needed for wheezing.     • atorvastatin (LIPITOR) 40 MG tablet Take 1 tablet by mouth Daily. 30 tablet 10   • baclofen (LIORESAL) 10 MG tablet Take 10 mg by mouth.     • buPROPion XL (WELLBUTRIN XL) 150 MG 24 hr tablet Take 1 tablet by mouth Daily. 30 tablet 4   • clopidogrel (PLAVIX) 75 MG tablet Take 75 mg by mouth Daily.     • dicyclomine (BENTYL) 20 MG tablet Take 1 tablet by mouth Every 6 (Six) Hours. 90 tablet 5   • furosemide (LASIX) 40 MG tablet Take 40 mg by mouth Daily.     • glucose blood (FREESTYLE LITE) test strip 1 each by Other route As Needed. Use as instructed     • glucose blood test strip Test tid dx code 250.00 on insulin     • HYDROcodone-acetaminophen (NORCO) 7.5-325 MG per tablet      • HYDROcodone-acetaminophen (NORCO) 7.5-325 MG per tablet Take 1 tablet by mouth 4 (Four) Times a Day for 30 days. 120 tablet 0   • HYDROcodone-acetaminophen (NORCO) 7.5-325 MG per tablet Take 1 tablet by mouth 4 (Four) Times a Day for 30 days. 120 tablet 0   • insulin aspart prot-insulin aspart (NOVOLOG MIX 70/30) (70-30) 100 UNIT/ML injection INJECT 25 UNITS INTO THE SKIN THREE TIMES A DAY BEFORE MEALS     • Insulin Syringe-Needle U-100 (GNP INSULIN SYRINGE) 31G X 5/16\" 0.3 ML misc      • isosorbide mononitrate (IMDUR) 30 MG 24 hr tablet Take 30 mg by mouth Daily.     • Lancets (ACCU-CHEK MULTICLIX) lancets Use to test three times a day     • lisinopril (PRINIVIL,ZESTRIL) 5 MG tablet Take 10 mg by mouth Daily.     • magnesium oxide (MAGOX) 400 (241.3 MG) MG tablet tablet Take 400 mg by mouth Daily.     • meloxicam (MOBIC) 15 MG tablet Take 15 mg by mouth Daily.     • metFORMIN (GLUCOPHAGE) 1000 MG tablet Take 1,000 mg by mouth 2 (Two) Times a Day With Meals.     • metoprolol succinate XL (TOPROL-XL) 25 MG 24 hr tablet Take 12.5 mg by mouth Daily.     • pantoprazole (PROTONIX) 40 MG EC tablet Take 1 tablet by mouth Daily. 30 tablet 5   • spironolactone (ALDACTONE) 25 MG tablet   11   • TRUEPLUS " "INSULIN SYRINGE 31G X 5/16\" 0.5 ML misc      • vitamin D (ERGOCALCIFEROL) 38622 UNITS capsule capsule Take 50,000 Units by mouth 1 (One) Time Per Week.       No current facility-administered medications for this visit.          OBJECTIVE    Ht 63\" (160 cm)  Wt 280 lb (127 kg)  BMI 49.6 kg/m2      Review of Systems   Constitutional: Negative for chills and fever.   Cardiovascular: Negative for chest pain.   Gastrointestinal: Negative for constipation, diarrhea, nausea and vomiting.   Musculoskeletal:Great toe pain    Constitutional: well developed, well nourished    HEENT: Normocephalic and atraumatic, normal hearing    Respiratory: Non labored respirations noted    Cardiovascular:    DP/PT pulses palpable    CFT brisk  to all digits  Skin temp is warm to warm from proximal tibia to distal digits  Pedal hair growth present.     Musculoskeletal:  Muscle strength is 5/5 for all muscle groups tested   ROM of the 1st MTP is full without pain or crepitus  ROM of the MTJ is full without pain or crepitus    ROM of the STJ is full without pain or crepitus    ROM of the ankle joint is full without pain or crepitus     Rectus foot type     Dermatological:    bilateral hallux nails are absent.  No signs of infection noted  No subcutaneous nodules or masses noted        Neurological:   Protective sensation decreased  Sensation intact to light touch    DTR intact    Psychiatric: A&O x 3 with normal mood and affect. NAD.        Procedures        ASSESSMENT AND PLAN    Mila was seen today for follow-up and follow-up.    Diagnoses and all orders for this visit:    Onychomycosis    Toe pain, bilateral    Type 2 diabetes mellitus with peripheral neuropathy    - Continue soaking and dressing the toes twice daily as instructed until there is no drainage.  When there is no drainage it is okay to discontinue soaks and dressings  - All questions were answered   - RTC if symptoms worsen or fail to improve          This document has " been electronically signed by Jozef Rubio DPM on November 7, 2017 5:31 PM     11/7/2017  5:31 PM

## 2017-12-05 ENCOUNTER — OFFICE VISIT (OUTPATIENT)
Dept: PAIN MEDICINE | Facility: CLINIC | Age: 62
End: 2017-12-05

## 2017-12-05 VITALS
SYSTOLIC BLOOD PRESSURE: 130 MMHG | HEIGHT: 63 IN | DIASTOLIC BLOOD PRESSURE: 80 MMHG | WEIGHT: 277.6 LBS | BODY MASS INDEX: 49.19 KG/M2

## 2017-12-05 DIAGNOSIS — E66.3 OVERWEIGHT: ICD-10-CM

## 2017-12-05 DIAGNOSIS — M51.36 DDD (DEGENERATIVE DISC DISEASE), LUMBAR: ICD-10-CM

## 2017-12-05 DIAGNOSIS — M47.817 LUMBOSACRAL SPONDYLOSIS WITHOUT MYELOPATHY: Primary | ICD-10-CM

## 2017-12-05 DIAGNOSIS — M79.18 MYOFACIAL MUSCLE PAIN: ICD-10-CM

## 2017-12-05 DIAGNOSIS — M54.2 CERVICALGIA: ICD-10-CM

## 2017-12-05 PROCEDURE — 99214 OFFICE O/P EST MOD 30 MIN: CPT | Performed by: NURSE PRACTITIONER

## 2017-12-05 NOTE — PROGRESS NOTES
Mila Fisher is a 62 y.o. female.   1955    HPI:   Location: neck and lower back  Quality: aching, sharp and dull  Severity: 8/10  Timing: constant  Alleviating: pain medication  Aggravating: increased activity    Pt with chronic neck and lower back pain. Pt working on weight loss. Opiate medications provides enough relief for daily activity and ambulation. NO SE. Pt happy with pain management visit and regimen.           The following portions of the patient's history were reviewed by me and updated as appropriate: allergies, current medications, past family history, past medical history, past social history, past surgical history and problem list.    Past Medical History:   Diagnosis Date   • Acute bronchitis    • Anxiety     Anxiety state      • Arthropathy of lumbar facet joint    • Asthma    • Astigmatism    • At risk for adverse drug event     Adverse drug event resulting from treatment of disorder      • Back ache    • Benign essential hypertension    • Cholecystitis     mild on ultrasound      • Chronic back pain    • Contact dermatitis    • COPD (chronic obstructive pulmonary disease)    • Cough    • Depression     Depressive disorder, not elsewhere classified      • Diabetes mellitus     no retinopathy      • Diabetes mellitus without complication     type II or unspecified type, not stated as uncontrolled      • Diarrhea    • Drug therapy     Other long term (current) drug therapy      • Dyspnea    • Electrocardiogram abnormal    • Epigastric pain    • Episodic mood disorder     Unspecified    • Esophageal reflux    • Esophagitis     grade II   • Foot pain     VS SMALL PHALANX AVULSION      • Generalized abdominal pain    • Generalized anxiety disorder    • History of colon polyps    • Hypermetropia    • Irritable bowel syndrome    • Knee pain 03/22/2016     being evaluated for knee replacements.       • Malaise and fatigue    • Myofascial pain    • Nausea and vomiting    • Neck pain    •  Onychomycosis    • LJ (obstructive sleep apnea)    • Osteoarthritis    • Pain in wrist    • Polyp of intestine     large intestine   • Pure hypercholesterolemia    • Right upper quadrant pain    • Screening for hyperlipidemia    • Shoulder pain 09/02/2014    possible Rotator Cuff Tendinitis      • Spasm of back muscles    • Sprain of sacroiliac ligament    • Transient cerebral ischemia    • Type 2 diabetes mellitus    • Type II diabetes mellitus, uncontrolled    • Upper respiratory infection    • Vitamin D deficiency        Social History     Social History   • Marital status:      Spouse name: N/A   • Number of children: N/A   • Years of education: N/A     Occupational History   • Not on file.     Social History Main Topics   • Smoking status: Former Smoker     Quit date: 1996   • Smokeless tobacco: Never Used      Comment: non smoker for years   • Alcohol use No   • Drug use: No   • Sexual activity: Defer     Other Topics Concern   • Not on file     Social History Narrative       Family History   Problem Relation Age of Onset   • Heart disease Other    • Bone cancer Other    • Lung cancer Other    • Throat cancer Other    • Cancer Other      Other   • Colon cancer Other      Colorectal Cancer   • Diabetes Other    • Hypertension Other          Current Outpatient Prescriptions:   •  acetaminophen (TYLENOL 8 HOUR) 650 MG 8 hr tablet, Take 1 tablet by mouth Every 8 (Eight) Hours As Needed for Mild Pain ., Disp: 60 tablet, Rfl: 1  •  albuterol (PROVENTIL HFA;VENTOLIN HFA) 108 (90 BASE) MCG/ACT inhaler, Inhale 2 puffs Every 4 (Four) Hours As Needed for wheezing., Disp: , Rfl:   •  albuterol (PROVENTIL) (2.5 MG/3ML) 0.083% nebulizer solution, Take 2.5 mg by nebulization Every 4 (Four) Hours As Needed for wheezing., Disp: , Rfl:   •  atorvastatin (LIPITOR) 40 MG tablet, Take 1 tablet by mouth Daily., Disp: 30 tablet, Rfl: 10  •  baclofen (LIORESAL) 10 MG tablet, Take 10 mg by mouth., Disp: , Rfl:   •  buPROPion  "XL (WELLBUTRIN XL) 150 MG 24 hr tablet, Take 1 tablet by mouth Daily., Disp: 30 tablet, Rfl: 4  •  clopidogrel (PLAVIX) 75 MG tablet, Take 75 mg by mouth Daily., Disp: , Rfl:   •  dicyclomine (BENTYL) 20 MG tablet, Take 1 tablet by mouth Every 6 (Six) Hours., Disp: 90 tablet, Rfl: 5  •  furosemide (LASIX) 40 MG tablet, Take 40 mg by mouth Daily., Disp: , Rfl:   •  glucose blood (FREESTYLE LITE) test strip, 1 each by Other route As Needed. Use as instructed, Disp: , Rfl:   •  glucose blood test strip, Test tid dx code 250.00 on insulin, Disp: , Rfl:   •  HYDROcodone-acetaminophen (NORCO) 7.5-325 MG per tablet, , Disp: , Rfl:   •  insulin aspart prot-insulin aspart (NOVOLOG MIX 70/30) (70-30) 100 UNIT/ML injection, INJECT 25 UNITS INTO THE SKIN THREE TIMES A DAY BEFORE MEALS, Disp: , Rfl:   •  Insulin Syringe-Needle U-100 (GNP INSULIN SYRINGE) 31G X 5/16\" 0.3 ML misc, , Disp: , Rfl:   •  isosorbide mononitrate (IMDUR) 30 MG 24 hr tablet, Take 30 mg by mouth Daily., Disp: , Rfl:   •  Lancets (ACCU-CHEK MULTICLIX) lancets, Use to test three times a day, Disp: , Rfl:   •  lisinopril (PRINIVIL,ZESTRIL) 5 MG tablet, Take 10 mg by mouth Daily., Disp: , Rfl:   •  magnesium oxide (MAGOX) 400 (241.3 MG) MG tablet tablet, Take 400 mg by mouth Daily., Disp: , Rfl:   •  meloxicam (MOBIC) 15 MG tablet, Take 15 mg by mouth Daily., Disp: , Rfl:   •  metFORMIN (GLUCOPHAGE) 1000 MG tablet, Take 1,000 mg by mouth 2 (Two) Times a Day With Meals., Disp: , Rfl:   •  metoprolol succinate XL (TOPROL-XL) 25 MG 24 hr tablet, Take 12.5 mg by mouth Daily., Disp: , Rfl:   •  pantoprazole (PROTONIX) 40 MG EC tablet, Take 1 tablet by mouth Daily., Disp: 30 tablet, Rfl: 5  •  spironolactone (ALDACTONE) 25 MG tablet, , Disp: , Rfl: 11  •  TRUEPLUS INSULIN SYRINGE 31G X 5/16\" 0.5 ML misc, , Disp: , Rfl:   •  vitamin D (ERGOCALCIFEROL) 11298 UNITS capsule capsule, Take 50,000 Units by mouth 1 (One) Time Per Week., Disp: , Rfl:     No Known " Allergies    Review of Systems   Musculoskeletal: Positive for back pain and neck pain.   All other systems reviewed and are negative.    All systems reviewed and negative except for above.    Physical Exam   Constitutional: She is oriented to person, place, and time. She appears well-developed and well-nourished. No distress.   Pulmonary/Chest: Effort normal. No respiratory distress.   Abdominal: Soft. There is no tenderness.   Musculoskeletal:        Cervical back: She exhibits tenderness. She exhibits normal range of motion.        Lumbar back: She exhibits decreased range of motion (Flex < 60 deg and 10 deg ext with facet loading bilateral) and tenderness (midline ttp ).   Neurological: She is alert and oriented to person, place, and time. She displays no tremor. She displays no seizure activity. Gait (antalgic ) abnormal. Coordination normal.   Reflex Scores:       Patellar reflexes are 1+ on the right side and 1+ on the left side.       Achilles reflexes are 1+ on the right side and 1+ on the left side.  Mild right slr       Skin: Skin is warm and dry. No rash noted. No pallor.   Psychiatric: She has a normal mood and affect. Her behavior is normal. Judgment normal. She expresses no homicidal and no suicidal ideation. She expresses no suicidal plans and no homicidal plans.   Nursing note and vitals reviewed.      Mila was seen today for back pain and neck pain.    Diagnoses and all orders for this visit:    Lumbosacral spondylosis without myelopathy    Myofacial muscle pain    Overweight    DDD (degenerative disc disease), lumbar    Cervicalgia        Medication: Patient reports no negative side effects, Patient reports appropriate usage and storage habits and Patient's opioid provides enough relief to be more active and perform activities of daily living with less discomfort. Norco 7.5mg qid. Discussed case with [facility], opiate medication refilled ×2 hand written scripts.      Interventional: EL[facility]  and any neurological impairments discussed with patient that may need of emergency evaluation. I have discussed weight control and issues with current exam, current weight 277lb    Last 2 weights    12/05/17  1009   Weight: 126 kg (277 lb 9.6 oz)           Rehab: Home back stretching.     Behavioral: No aberrant behavior noted. [facility] Report # [id number]  was reviewed and is consistent with stated history    Urine drug screen Reviewed from last visit and is appropriate          This document has been electronically signed by NANO Evangelista on December 5, 2017 10:51 AM          This document has been electronically signed by NANO Evangelista on December 5, 2017 10:51 AM

## 2017-12-19 ENCOUNTER — OFFICE VISIT (OUTPATIENT)
Dept: SLEEP MEDICINE | Facility: HOSPITAL | Age: 62
End: 2017-12-19

## 2017-12-19 VITALS
BODY MASS INDEX: 48.37 KG/M2 | DIASTOLIC BLOOD PRESSURE: 60 MMHG | SYSTOLIC BLOOD PRESSURE: 128 MMHG | HEIGHT: 63 IN | WEIGHT: 273 LBS

## 2017-12-19 DIAGNOSIS — G47.33 OBSTRUCTIVE SLEEP APNEA, ADULT: Primary | ICD-10-CM

## 2017-12-19 PROCEDURE — 99213 OFFICE O/P EST LOW 20 MIN: CPT | Performed by: INTERNAL MEDICINE

## 2017-12-19 NOTE — PROGRESS NOTES
Sleep Clinic Follow Up    Date: 12/19/2017  Primary Care Physician: Casey López MD      Interim History (1/3):  Since the last visit on 09/27/2016, patient has:      1)  LJ - Has remained with poor compliance with CPAP. Her mask leaks on around the mouth and her water chamber does on empty the water each night. She has dry mouth, She denies abnormal dreams, sleep paralysis, hypnagogic hallucinations, or cataplexy symptoms. She admits to a lot of life stress    PAP Data:  Time frame: 10/18/2016 - 09/21/2017   Compliance 8.6%  PAP range : 12 cm H2O  Average 90% pressure: 12 cmH2O  Leak: 16.5 minutes   Average AHI 1.4 events/hr  Mask type: FFM  DME:     Bed time: 3411-2246  Sleep latency:  minutes  Number of times awakens during the night: 3-4  Wake time: 7720-6608  Estimated total sleep time at night: 4-6 hours  Caffeine intake: 1-2 coffee  Alcohol intake: none  Nap time: none  Sleepiness with Driving: none    [place] - 1    2) Patient denies RLS symptoms.   PMHx, FH, SH reviewed and pertinent changes are:  unchanged from last office visit on 09/27/2016      REVIEW OF SYSTEMS:   Negative for chest pain, fever, chills, SOA, abdominal pain. Smoking: none      Exam (6-11/12):    There were no vitals filed for this visit.  HR - 74  RR - 15    There is no height or weight on file to calculate BMI.  Gen:  No distress, conversant, tearful, appears stated age, alert, oriented  Eyes:   Anicteric sclera, moist conjunctiva, no lid lag     PERRLA, EOMI   Heent:   NC/AT    Oropharynx clear, Mallampati 4, poor dentition    normal hearing  Lungs:  Normal effort, non-labored breathing    Clear to auscultation    CV:  Normal S1/S2, no murmur    no lower extremity edema  ABD:  Soft, normal bowel sounds       Psych:  Appropriate affect  Neuro:  CN 2-12 intact    Past Medical History:   Diagnosis Date   • Acute bronchitis    • Anxiety     Anxiety state      • Arthropathy of lumbar facet joint    • Asthma    •  Astigmatism    • At risk for adverse drug event     Adverse drug event resulting from treatment of disorder      • Back ache    • Benign essential hypertension    • Cholecystitis     mild on ultrasound      • Chronic back pain    • Contact dermatitis    • COPD (chronic obstructive pulmonary disease)    • Cough    • Depression     Depressive disorder, not elsewhere classified      • Diabetes mellitus     no retinopathy      • Diabetes mellitus without complication     type II or unspecified type, not stated as uncontrolled      • Diarrhea    • Drug therapy     Other long term (current) drug therapy      • Dyspnea    • Electrocardiogram abnormal    • Epigastric pain    • Episodic mood disorder     Unspecified    • Esophageal reflux    • Esophagitis     grade II   • Foot pain     VS SMALL PHALANX AVULSION      • Generalized abdominal pain    • Generalized anxiety disorder    • History of colon polyps    • Hypermetropia    • Irritable bowel syndrome    • Knee pain 03/22/2016     being evaluated for knee replacements.       • Malaise and fatigue    • Myofascial pain    • Nausea and vomiting    • Neck pain    • Onychomycosis    • LJ (obstructive sleep apnea)    • Osteoarthritis    • Pain in wrist    • Polyp of intestine     large intestine   • Pure hypercholesterolemia    • Right upper quadrant pain    • Screening for hyperlipidemia    • Shoulder pain 09/02/2014    possible Rotator Cuff Tendinitis      • Spasm of back muscles    • Sprain of sacroiliac ligament    • Transient cerebral ischemia    • Type 2 diabetes mellitus    • Type II diabetes mellitus, uncontrolled    • Upper respiratory infection    • Vitamin D deficiency        Current Outpatient Prescriptions:   •  acetaminophen (TYLENOL 8 HOUR) 650 MG 8 hr tablet, Take 1 tablet by mouth Every 8 (Eight) Hours As Needed for Mild Pain ., Disp: 60 tablet, Rfl: 1  •  albuterol (PROVENTIL HFA;VENTOLIN HFA) 108 (90 BASE) MCG/ACT inhaler, Inhale 2 puffs Every 4 (Four) Hours  "As Needed for wheezing., Disp: , Rfl:   •  albuterol (PROVENTIL) (2.5 MG/3ML) 0.083% nebulizer solution, Take 2.5 mg by nebulization Every 4 (Four) Hours As Needed for wheezing., Disp: , Rfl:   •  atorvastatin (LIPITOR) 40 MG tablet, Take 1 tablet by mouth Daily., Disp: 30 tablet, Rfl: 10  •  baclofen (LIORESAL) 10 MG tablet, Take 10 mg by mouth., Disp: , Rfl:   •  buPROPion XL (WELLBUTRIN XL) 150 MG 24 hr tablet, Take 1 tablet by mouth Daily., Disp: 30 tablet, Rfl: 4  •  clopidogrel (PLAVIX) 75 MG tablet, Take 75 mg by mouth Daily., Disp: , Rfl:   •  dicyclomine (BENTYL) 20 MG tablet, Take 1 tablet by mouth Every 6 (Six) Hours., Disp: 90 tablet, Rfl: 5  •  furosemide (LASIX) 40 MG tablet, Take 40 mg by mouth Daily., Disp: , Rfl:   •  glucose blood (FREESTYLE LITE) test strip, 1 each by Other route As Needed. Use as instructed, Disp: , Rfl:   •  glucose blood test strip, Test tid dx code 250.00 on insulin, Disp: , Rfl:   •  HYDROcodone-acetaminophen (NORCO) 7.5-325 MG per tablet, , Disp: , Rfl:   •  insulin aspart prot-insulin aspart (NOVOLOG MIX 70/30) (70-30) 100 UNIT/ML injection, INJECT 25 UNITS INTO THE SKIN THREE TIMES A DAY BEFORE MEALS, Disp: , Rfl:   •  Insulin Syringe-Needle U-100 (GNP INSULIN SYRINGE) 31G X 5/16\" 0.3 ML misc, , Disp: , Rfl:   •  isosorbide mononitrate (IMDUR) 30 MG 24 hr tablet, Take 30 mg by mouth Daily., Disp: , Rfl:   •  Lancets (ACCU-CHEK MULTICLIX) lancets, Use to test three times a day, Disp: , Rfl:   •  lisinopril (PRINIVIL,ZESTRIL) 5 MG tablet, Take 10 mg by mouth Daily., Disp: , Rfl:   •  magnesium oxide (MAGOX) 400 (241.3 MG) MG tablet tablet, Take 400 mg by mouth Daily., Disp: , Rfl:   •  meloxicam (MOBIC) 15 MG tablet, Take 15 mg by mouth Daily., Disp: , Rfl:   •  metFORMIN (GLUCOPHAGE) 1000 MG tablet, Take 1,000 mg by mouth 2 (Two) Times a Day With Meals., Disp: , Rfl:   •  metoprolol succinate XL (TOPROL-XL) 25 MG 24 hr tablet, Take 12.5 mg by mouth Daily., Disp: , Rfl:   •  " "pantoprazole (PROTONIX) 40 MG EC tablet, Take 1 tablet by mouth Daily., Disp: 30 tablet, Rfl: 5  •  spironolactone (ALDACTONE) 25 MG tablet, , Disp: , Rfl: 11  •  TRUEPLUS INSULIN SYRINGE 31G X 5/16\" 0.5 ML misc, , Disp: , Rfl:   •  vitamin D (ERGOCALCIFEROL) 70721 UNITS capsule capsule, Take 50,000 Units by mouth 1 (One) Time Per Week., Disp: , Rfl:       ASSESSMENT / PLAN:     1. Obstructive sleep apnea  1. AHI of ?  2. Currently on 12 cm H2O  3. Increase to 17 cm H2O. She was better on 15 cm H2O, had less dyspnea.  4. Script for PAP supplies  5. Return to clinic in 3 months  6. Referral for sleep education - bring machine and masks  2. COPD and DM  3. Stress - son with end stage liver disease, another with COPD (30%) and DM.      Total time 15 min, more than half spent in face to face counseling and coordination of care.     This document has been electronically signed by Aric Aranda MD on December 19, 2017         CC: Casey López MD          No ref. provider found  "

## 2018-01-03 ENCOUNTER — APPOINTMENT (OUTPATIENT)
Dept: SLEEP MEDICINE | Facility: HOSPITAL | Age: 63
End: 2018-01-03
Attending: INTERNAL MEDICINE

## 2018-01-08 ENCOUNTER — OFFICE VISIT (OUTPATIENT)
Dept: GASTROENTEROLOGY | Facility: CLINIC | Age: 63
End: 2018-01-08

## 2018-01-08 VITALS
DIASTOLIC BLOOD PRESSURE: 68 MMHG | SYSTOLIC BLOOD PRESSURE: 118 MMHG | BODY MASS INDEX: 48.76 KG/M2 | HEIGHT: 63 IN | WEIGHT: 275.2 LBS | HEART RATE: 62 BPM

## 2018-01-08 DIAGNOSIS — R10.84 GENERALIZED ABDOMINAL PAIN: ICD-10-CM

## 2018-01-08 DIAGNOSIS — R19.7 DIARRHEA, UNSPECIFIED TYPE: ICD-10-CM

## 2018-01-08 DIAGNOSIS — K21.00 GASTROESOPHAGEAL REFLUX DISEASE WITH ESOPHAGITIS: ICD-10-CM

## 2018-01-08 DIAGNOSIS — Z86.010 HISTORY OF COLON POLYPS: ICD-10-CM

## 2018-01-08 DIAGNOSIS — R19.4 CHANGE IN BOWEL HABITS: ICD-10-CM

## 2018-01-08 DIAGNOSIS — K58.0 IRRITABLE BOWEL SYNDROME WITH DIARRHEA: Primary | ICD-10-CM

## 2018-01-08 PROBLEM — Z86.0100 HISTORY OF COLON POLYPS: Status: ACTIVE | Noted: 2018-01-08

## 2018-01-08 PROCEDURE — 99214 OFFICE O/P EST MOD 30 MIN: CPT | Performed by: PHYSICIAN ASSISTANT

## 2018-01-08 RX ORDER — METRONIDAZOLE 500 MG/1
500 TABLET ORAL
Qty: 20 TABLET | Refills: 0 | Status: SHIPPED | OUTPATIENT
Start: 2018-01-08 | End: 2018-01-22

## 2018-01-08 RX ORDER — PRAVASTATIN SODIUM 40 MG
40 TABLET ORAL DAILY
COMMUNITY
Start: 2018-01-02 | End: 2018-02-12 | Stop reason: ALTCHOICE

## 2018-01-08 RX ORDER — DEXTROSE AND SODIUM CHLORIDE 5; .45 G/100ML; G/100ML
30 INJECTION, SOLUTION INTRAVENOUS CONTINUOUS PRN
Status: CANCELLED | OUTPATIENT
Start: 2018-01-26

## 2018-01-08 RX ORDER — TRAZODONE HYDROCHLORIDE 50 MG/1
50 TABLET ORAL NIGHTLY
COMMUNITY
Start: 2018-01-02 | End: 2021-03-11

## 2018-01-08 NOTE — PROGRESS NOTES
Chief Complaint   Patient presents with   • Nausea   • Vomiting   • Abdominal Pain   • Irritable Bowel Syndrome   • Heartburn   • Diarrhea       ENDO PROCEDURE ORDERED: EGD/COLON, GERD, N, Abd pain, Diarrhea, change bowels, hx polyp    Subjective    Mila Fisher is a 62 y.o. female. she is here today for follow-up.    History of Present Illness    HISTORY:  Patient presents for follow up on her GERD, IBS, nausea, and abdominal pain.  Last seen on 03/15/2017 .  Patient presents complaining for the past month everything she eats bothers her stomach.  She will eat and have urgent diarrhea.  She complains of 5 out of 10 abdominal pain today.  She is having nausea, but no vomiting.  Denies dysphagia.  She is on Protonix 40 mg daily.  She is having a lot of cramping in the low abdomen.  She states she is still taking her Bentyl 20 mg q.i.d.  No blood or mucus in her stool.  Weight is up 1 pound since last visit.  She denies any recent antibiotics.  She denies fever.  She had her previous negative hepatitis diagnostic panel on 09/11/2017.  Patient states she did not return for follow up until today because she had her toenails removed and had difficulty with that.  She has had problems with her right leg.  She had a previous EGD/colonoscopy on 06/10/2015 that showed esophagitis, gastritis, colon polyp, hemorrhoids.     Most recent laboratory at [facility] with Casey López MD on 11/14/2017 showed an A1c of 6.7%.  CMP showed a glucose of 220, BUN 23, creatinine 1.2, potassium 5.5, otherwise normal.  She had a previous CBC last in 2016 that was normal.      ASSESSMENT:  Increasing GERD, abdominal pain, change in bowel habits, increasing diarrhea, possible bacterial overgrowth.      PLAN:    1.  Recommend trial on Flagyl 500 mg b.i.d. for 10 days.    2.  Recommend EGD/colonoscopy with biopsies to further evaluate given her history of colon polyp, change in bowel habits, diarrhea, and abdominal pain.     3.  Otherwise, will continue on current medications.    4.  She is asked to follow up after the above, further pending clinical course and the results of the above.                    The following portions of the patient's history were reviewed and updated as appropriate:   Past Medical History:   Diagnosis Date   • Acute bronchitis    • Anxiety     Anxiety state      • Arthropathy of lumbar facet joint    • Asthma    • Astigmatism    • At risk for adverse drug event     Adverse drug event resulting from treatment of disorder      • Back ache    • Benign essential hypertension    • Cholecystitis     mild on ultrasound      • Chronic back pain    • Contact dermatitis    • COPD (chronic obstructive pulmonary disease)    • Cough    • Depression     Depressive disorder, not elsewhere classified      • Diabetes mellitus     no retinopathy      • Diabetes mellitus without complication     type II or unspecified type, not stated as uncontrolled      • Diarrhea    • Drug therapy     Other long term (current) drug therapy      • Dyspnea    • Electrocardiogram abnormal    • Epigastric pain    • Episodic mood disorder     Unspecified    • Esophageal reflux    • Esophagitis     grade II   • Foot pain     VS SMALL PHALANX AVULSION      • Generalized abdominal pain    • Generalized anxiety disorder    • History of colon polyps    • Hypermetropia    • Irritable bowel syndrome    • Knee pain 03/22/2016     being evaluated for knee replacements.       • Malaise and fatigue    • Myofascial pain    • Nausea and vomiting    • Neck pain    • Onychomycosis    • LJ (obstructive sleep apnea)    • Osteoarthritis    • Pain in wrist    • Polyp of intestine     large intestine   • Pure hypercholesterolemia    • Right upper quadrant pain    • Screening for hyperlipidemia    • Shoulder pain 09/02/2014    possible Rotator Cuff Tendinitis      • Spasm of back muscles    • Sprain of sacroiliac ligament    • Transient cerebral ischemia    • Type  2 diabetes mellitus    • Type II diabetes mellitus, uncontrolled    • Upper respiratory infection    • Vitamin D deficiency      Past Surgical History:   Procedure Laterality Date   • CARPAL TUNNEL RELEASE     • COLONOSCOPY  06/10/2015   • COLONOSCOPY W/ POLYPECTOMY  06/10/2015    Colonoscopy remove polyps 48009 (1)      • ENDOSCOPY  06/10/2015    EGD w/ biopsy 70174 (1)      • INJECTION OF MEDICATION  09/02/2014    Drain/Inject Intermed Joint 20605 (1)      • TUBAL ABDOMINAL LIGATION      Tubal ligation (1)      • UPPER GASTROINTESTINAL ENDOSCOPY  06/10/2015     Family History   Problem Relation Age of Onset   • Heart disease Other    • Bone cancer Other    • Lung cancer Other    • Throat cancer Other    • Cancer Other      Other   • Colon cancer Other      Colorectal Cancer   • Diabetes Other    • Hypertension Other      OB History     No data available        No Known Allergies  Social History     Social History   • Marital status:      Spouse name: N/A   • Number of children: N/A   • Years of education: N/A     Social History Main Topics   • Smoking status: Former Smoker     Quit date: 1996   • Smokeless tobacco: Never Used      Comment: non smoker for years   • Alcohol use No   • Drug use: No   • Sexual activity: Defer     Other Topics Concern   • None     Social History Narrative       Current Outpatient Prescriptions:   •  acetaminophen (TYLENOL 8 HOUR) 650 MG 8 hr tablet, Take 1 tablet by mouth Every 8 (Eight) Hours As Needed for Mild Pain ., Disp: 60 tablet, Rfl: 1  •  albuterol (PROVENTIL HFA;VENTOLIN HFA) 108 (90 BASE) MCG/ACT inhaler, Inhale 2 puffs Every 4 (Four) Hours As Needed for wheezing., Disp: , Rfl:   •  albuterol (PROVENTIL) (2.5 MG/3ML) 0.083% nebulizer solution, Take 2.5 mg by nebulization Every 4 (Four) Hours As Needed for wheezing., Disp: , Rfl:   •  baclofen (LIORESAL) 10 MG tablet, Take 10 mg by mouth., Disp: , Rfl:   •  buPROPion XL (WELLBUTRIN XL) 150 MG 24 hr tablet, Take 1  "tablet by mouth Daily., Disp: 30 tablet, Rfl: 4  •  clopidogrel (PLAVIX) 75 MG tablet, Take 75 mg by mouth Daily., Disp: , Rfl:   •  dicyclomine (BENTYL) 20 MG tablet, Take 1 tablet by mouth Every 6 (Six) Hours., Disp: 90 tablet, Rfl: 5  •  furosemide (LASIX) 40 MG tablet, Take 40 mg by mouth Daily., Disp: , Rfl:   •  glucose blood (FREESTYLE LITE) test strip, 1 each by Other route As Needed. Use as instructed, Disp: , Rfl:   •  glucose blood test strip, Test tid dx code 250.00 on insulin, Disp: , Rfl:   •  HYDROcodone-acetaminophen (NORCO) 7.5-325 MG per tablet, Take 1 tablet by mouth Every 6 (Six) Hours As Needed., Disp: , Rfl:   •  insulin aspart prot-insulin aspart (NOVOLOG MIX 70/30) (70-30) 100 UNIT/ML injection, INJECT 25 UNITS INTO THE SKIN THREE TIMES A DAY BEFORE MEALS, Disp: , Rfl:   •  Insulin Syringe-Needle U-100 (GNP INSULIN SYRINGE) 31G X 5/16\" 0.3 ML misc, , Disp: , Rfl:   •  isosorbide mononitrate (IMDUR) 30 MG 24 hr tablet, Take 30 mg by mouth Daily., Disp: , Rfl:   •  Lancets (ACCU-CHEK MULTICLIX) lancets, Use to test three times a day, Disp: , Rfl:   •  lisinopril (PRINIVIL,ZESTRIL) 5 MG tablet, Take 10 mg by mouth Daily., Disp: , Rfl:   •  magnesium oxide (MAGOX) 400 (241.3 MG) MG tablet tablet, Take 400 mg by mouth Daily., Disp: , Rfl:   •  meloxicam (MOBIC) 15 MG tablet, Take 15 mg by mouth Daily., Disp: , Rfl:   •  metFORMIN (GLUCOPHAGE) 1000 MG tablet, Take 1,000 mg by mouth 2 (Two) Times a Day With Meals., Disp: , Rfl:   •  metoprolol succinate XL (TOPROL-XL) 25 MG 24 hr tablet, Take 12.5 mg by mouth Daily., Disp: , Rfl:   •  pantoprazole (PROTONIX) 40 MG EC tablet, Take 1 tablet by mouth Daily., Disp: 30 tablet, Rfl: 5  •  pravastatin (PRAVACHOL) 40 MG tablet, Take 40 mg by mouth Daily., Disp: , Rfl:   •  spironolactone (ALDACTONE) 25 MG tablet, Take 25 mg by mouth Daily., Disp: , Rfl: 11  •  traZODone (DESYREL) 50 MG tablet, Take 50 mg by mouth Every Night., Disp: , Rfl:   •  TRUEPLUS " "INSULIN SYRINGE 31G X 5/16\" 0.5 ML misc, , Disp: , Rfl:   •  vitamin D (ERGOCALCIFEROL) 98224 UNITS capsule capsule, Take 50,000 Units by mouth 1 (One) Time Per Week., Disp: , Rfl:   •  metroNIDAZOLE (FLAGYL) 500 MG tablet, Take 1 tablet by mouth 2 (Two) Times a Day., Disp: 20 tablet, Rfl: 0  •  polyethylene glycol (GoLYTELY) 236 g solution, As directed per instruction sheet for colonoscopy, Disp: 4000 mL, Rfl: 0  Review of Systems  Review of Systems       Objective    /68 (BP Location: Left arm)  Pulse 62  Ht 160 cm (62.99\")  Wt 125 kg (275 lb 3.2 oz)  BMI 48.76 kg/m2  Physical Exam   Constitutional: She is oriented to person, place, and time. She appears well-developed and well-nourished. No distress.   Chronically ill   HENT:   Head: Normocephalic and atraumatic.   Eyes: EOM are normal. Pupils are equal, round, and reactive to light.   Neck: Normal range of motion.   Cardiovascular: Normal rate, regular rhythm and normal heart sounds.    Pulmonary/Chest: Effort normal and breath sounds normal.   Abdominal: Soft. Bowel sounds are normal. She exhibits no shifting dullness, no distension, no abdominal bruit, no ascites and no mass. There is no hepatosplenomegaly. There is tenderness. There is no rigidity, no rebound, no guarding and no CVA tenderness. A hernia is present. Hernia confirmed negative in the ventral area.   Obese, mild diffuse. Moderate/large ventral diastasis   Musculoskeletal: Normal range of motion.   Neurological: She is alert and oriented to person, place, and time.   Skin: Skin is warm and dry.   Psychiatric: She has a normal mood and affect. Her behavior is normal. Judgment and thought content normal.   Nursing note and vitals reviewed.    Assessment/Plan      1. Irritable bowel syndrome with diarrhea    2. Gastroesophageal reflux disease with esophagitis    3. Generalized abdominal pain    4. Diarrhea, unspecified type    5. History of colon polyps    6. Change in bowel habits    . "   Mila was seen today for nausea, vomiting, abdominal pain, irritable bowel syndrome, heartburn and diarrhea.    Diagnoses and all orders for this visit:    Irritable bowel syndrome with diarrhea  -     Case Request; Standing  -     dextrose 5 % and sodium chloride 0.45 % infusion; Infuse 30 mL/hr into a venous catheter Continuous As Needed (Start Prior to Procedure).  -     Case Request    Gastroesophageal reflux disease with esophagitis  -     Case Request; Standing  -     dextrose 5 % and sodium chloride 0.45 % infusion; Infuse 30 mL/hr into a venous catheter Continuous As Needed (Start Prior to Procedure).  -     Case Request    Generalized abdominal pain  -     Case Request; Standing  -     dextrose 5 % and sodium chloride 0.45 % infusion; Infuse 30 mL/hr into a venous catheter Continuous As Needed (Start Prior to Procedure).  -     Case Request    Diarrhea, unspecified type  -     Case Request; Standing  -     dextrose 5 % and sodium chloride 0.45 % infusion; Infuse 30 mL/hr into a venous catheter Continuous As Needed (Start Prior to Procedure).  -     Case Request    History of colon polyps  -     Case Request; Standing  -     dextrose 5 % and sodium chloride 0.45 % infusion; Infuse 30 mL/hr into a venous catheter Continuous As Needed (Start Prior to Procedure).  -     Case Request    Change in bowel habits  -     Case Request; Standing  -     dextrose 5 % and sodium chloride 0.45 % infusion; Infuse 30 mL/hr into a venous catheter Continuous As Needed (Start Prior to Procedure).  -     Case Request    Other orders  -     Obtain Informed Consent; Standing  -     POC Glucose Once; Standing  -     polyethylene glycol (GoLYTELY) 236 g solution; As directed per instruction sheet for colonoscopy  -     metroNIDAZOLE (FLAGYL) 500 MG tablet; Take 1 tablet by mouth 2 (Two) Times a Day.        Orders placed during this encounter include:  No orders of the defined types were placed in this encounter.      Medications  prescribed:  New Medications Ordered This Visit   Medications   • polyethylene glycol (GoLYTELY) 236 g solution     Sig: As directed per instruction sheet for colonoscopy     Dispense:  4000 mL     Refill:  0   • metroNIDAZOLE (FLAGYL) 500 MG tablet     Sig: Take 1 tablet by mouth 2 (Two) Times a Day.     Dispense:  20 tablet     Refill:  0     Discontinued Medications       Reason for Discontinue    atorvastatin (LIPITOR) 40 MG tablet Therapy completed        Requested Prescriptions     Signed Prescriptions Disp Refills   • polyethylene glycol (GoLYTELY) 236 g solution 4000 mL 0     Sig: As directed per instruction sheet for colonoscopy   • metroNIDAZOLE (FLAGYL) 500 MG tablet 20 tablet 0     Sig: Take 1 tablet by mouth 2 (Two) Times a Day.       Review and/or summary of lab tests, radiology, procedures, medications. Review and summary of old records and obtaining of history. The risks and benefits of my recommendations, as well as other treatment options were discussed with the patient today. Questions were answered.    Follow-up: Return if symptoms worsen or fail to improve, for After the above.     ESOPHAGOGASTRODUODENOSCOPY--urgent, bx (N/A), COLONOSCOPY--bx (N/A)      This document has been electronically signed by Jarett Mcdonough PA-C on January 8, 2018 5:16 PM      Results for orders placed or performed in visit on 10/31/17   [employer] Select 13 (MW) - Urine, Clean Catch   Result Value Ref Range    Report Summary FINAL    Results for orders placed or performed in visit on 09/11/17   Hepatitis panel, acute   Result Value Ref Range    Hepatitis C Ab Negative Negative    Hep A IgM Negative Negative    Hep B C IgM Negative Negative    Hepatitis B Surface Ag Negative Negative   Results for orders placed or performed in visit on 03/07/17   [employer] 13 (MW)   Result Value Ref Range    Report Summary FINAL     PDF Image .    Results for orders placed or performed during the hospital encounter of 02/08/17    Gold Top - SST   Result Value Ref Range    Extra Tube Hold for add-ons.    Green Top (Gel)   Result Value Ref Range    Extra Tube Hold for add-ons.    CK-MB   Result Value Ref Range    CKMB 0.68 0.00 - 5.00 ng/mL   CBC Auto Differential   Result Value Ref Range    WBC 4.83 3.20 - 9.80 10*3/mm3    RBC 4.41 3.77 - 5.16 10*6/mm3    Hemoglobin 12.1 12.0 - 15.5 g/dL    Hematocrit 36.7 35.0 - 45.0 %    MCV 83.2 80.0 - 98.0 fL    MCH 27.4 26.5 - 34.0 pg    MCHC 33.0 31.4 - 36.0 g/dL    RDW 13.3 11.5 - 14.5 %    RDW-SD 40.0 36.4 - 46.3 fl    MPV 10.8 8.0 - 12.0 fL    Platelets 126 (L) 150 - 450 10*3/mm3    Neutrophil % 49.3 37.0 - 80.0 %    Lymphocyte % 42.2 10.0 - 50.0 %    Monocyte % 5.8 0.0 - 12.0 %    Eosinophil % 2.1 0.0 - 7.0 %    Basophil % 0.4 0.0 - 2.0 %    Immature Grans % 0.2 0.0 - 0.5 %    Neutrophils, Absolute 2.38 2.00 - 8.60 10*3/mm3    Lymphocytes, Absolute 2.04 0.60 - 4.20 10*3/mm3    Monocytes, Absolute 0.28 0.00 - 0.90 10*3/mm3    Eosinophils, Absolute 0.10 0.00 - 0.70 10*3/mm3    Basophils, Absolute 0.02 0.00 - 0.20 10*3/mm3    Immature Grans, Absolute 0.01 0.00 - 0.02 10*3/mm3   CBC Auto Differential   Result Value Ref Range    WBC 6.83 3.20 - 9.80 10*3/mm3    RBC 3.77 3.77 - 5.16 10*6/mm3    Hemoglobin 10.6 (L) 12.0 - 15.5 g/dL    Hematocrit 30.9 (L) 35.0 - 45.0 %    MCV 82.0 80.0 - 98.0 fL    MCH 28.1 26.5 - 34.0 pg    MCHC 34.3 31.4 - 36.0 g/dL    RDW 14.3 11.5 - 14.5 %    RDW-SD 42.8 36.4 - 46.3 fl    MPV 11.2 8.0 - 12.0 fL    Platelets 166 150 - 450 10*3/mm3    Neutrophil % 50.1 37.0 - 80.0 %    Lymphocyte % 40.7 10.0 - 50.0 %    Monocyte % 6.3 0.0 - 12.0 %    Eosinophil % 2.3 0.0 - 7.0 %    Basophil % 0.3 0.0 - 2.0 %    Immature Grans % 0.3 0.0 - 0.5 %    Neutrophils, Absolute 3.42 2.00 - 8.60 10*3/mm3    Lymphocytes, Absolute 2.78 0.60 - 4.20 10*3/mm3    Monocytes, Absolute 0.43 0.00 - 0.90 10*3/mm3    Eosinophils, Absolute 0.16 0.00 - 0.70 10*3/mm3    Basophils, Absolute 0.02 0.00 - 0.20  10*3/mm3    Immature Grans, Absolute 0.02 0.00 - 0.02 10*3/mm3   Lavender Top   Result Value Ref Range    Extra Tube hold for add-on    Light Blue Top   Result Value Ref Range    Extra Tube hold for add-on    Troponin   Result Value Ref Range    Troponin I <0.012 <=0.034 ng/mL   Troponin   Result Value Ref Range    Troponin I <0.012 <=0.034 ng/mL   D-dimer, Quantitative   Result Value Ref Range    D-Dimer, Quantitative <270 0 - 470 ng/mL (FEU)   D-dimer, Quantitative   Result Value Ref Range    D-Dimer, Quantitative <270 0 - 470 ng/mL (FEU)   POC Glucose Fingerstick   Result Value Ref Range    Glucose 275 (H) 70 - 130 mg/dL   POC Glucose Fingerstick   Result Value Ref Range    Glucose 192 (H) 70 - 130 mg/dL   POC Glucose Fingerstick   Result Value Ref Range    Glucose 153 (H) 70 - 130 mg/dL   BNP   Result Value Ref Range    proBNP 284.0 0.0 - 900.0 pg/mL   CK   Result Value Ref Range    Creatine Kinase 75 30 - 135 U/L   Comprehensive Metabolic Panel   Result Value Ref Range    Glucose 181 (H) 60 - 100 mg/dL    BUN 23 (H) 7 - 21 mg/dL    Creatinine 1.02 (H) 0.50 - 1.00 mg/dL    Sodium 137 137 - 145 mmol/L    Potassium 3.9 3.5 - 5.1 mmol/L    Chloride 103 95 - 110 mmol/L    CO2 27.0 22.0 - 31.0 mmol/L    Calcium 9.1 8.4 - 10.2 mg/dL    Total Protein 6.2 (L) 6.3 - 8.6 g/dL    Albumin 3.60 3.40 - 4.80 g/dL    ALT (SGPT) 16 9 - 52 U/L    AST (SGOT) 15 14 - 36 U/L    Alkaline Phosphatase 48 38 - 126 U/L    Total Bilirubin 0.3 0.2 - 1.3 mg/dL    eGFR Non  Amer 55 45 - 104 mL/min/1.73    Globulin 2.6 2.3 - 3.5 gm/dL    A/G Ratio 1.4 1.1 - 1.8 g/dL    BUN/Creatinine Ratio 22.5 7.0 - 25.0    Anion Gap 7.0 5.0 - 15.0 mmol/L   Comprehensive Metabolic Panel   Result Value Ref Range    Glucose 192 (H) 60 - 100 mg/dL    BUN 23 (H) 7 - 21 mg/dL    Creatinine 1.06 (H) 0.50 - 1.00 mg/dL    Sodium 140 137 - 145 mmol/L    Potassium 3.9 3.5 - 5.1 mmol/L    Chloride 103 95 - 110 mmol/L    CO2 28.0 22.0 - 31.0 mmol/L    Calcium 9.4  8.4 - 10.2 mg/dL    Total Protein 6.6 6.3 - 8.6 g/dL    Albumin 3.90 3.40 - 4.80 g/dL    ALT (SGPT) 26 9 - 52 U/L    AST (SGOT) 27 14 - 36 U/L    Alkaline Phosphatase 50 38 - 126 U/L    Total Bilirubin 0.3 0.2 - 1.3 mg/dL    eGFR Non  Amer 52 45 - 104 mL/min/1.73    Globulin 2.7 2.3 - 3.5 gm/dL    A/G Ratio 1.4 1.1 - 1.8 g/dL    BUN/Creatinine Ratio 21.7 7.0 - 25.0    Anion Gap 9.0 5.0 - 15.0 mmol/L     *Note: Due to a large number of results and/or encounters for the requested time period, some results have not been displayed. A complete set of results can be found in Results Review.       Some portions of this note have been dictated using voice recognition software and may contain errors and/or omissions.

## 2018-01-16 ENCOUNTER — HOSPITAL ENCOUNTER (OUTPATIENT)
Dept: SLEEP MEDICINE | Facility: HOSPITAL | Age: 63
End: 2018-01-16
Attending: INTERNAL MEDICINE

## 2018-01-22 RX ORDER — INSULIN ASPART 100 [IU]/ML
20 INJECTION, SUSPENSION SUBCUTANEOUS
COMMUNITY
End: 2021-03-11 | Stop reason: ALTCHOICE

## 2018-01-26 ENCOUNTER — ANESTHESIA EVENT (OUTPATIENT)
Dept: GASTROENTEROLOGY | Facility: HOSPITAL | Age: 63
End: 2018-01-26

## 2018-01-26 ENCOUNTER — HOSPITAL ENCOUNTER (OUTPATIENT)
Facility: HOSPITAL | Age: 63
Setting detail: HOSPITAL OUTPATIENT SURGERY
Discharge: HOME | End: 2018-01-26
Attending: INTERNAL MEDICINE | Admitting: INTERNAL MEDICINE

## 2018-01-26 ENCOUNTER — ANESTHESIA (OUTPATIENT)
Dept: GASTROENTEROLOGY | Facility: HOSPITAL | Age: 63
End: 2018-01-26

## 2018-01-26 VITALS
RESPIRATION RATE: 20 BRPM | SYSTOLIC BLOOD PRESSURE: 132 MMHG | BODY MASS INDEX: 48.67 KG/M2 | HEART RATE: 65 BPM | DIASTOLIC BLOOD PRESSURE: 50 MMHG | WEIGHT: 274.69 LBS | OXYGEN SATURATION: 97 % | HEIGHT: 63 IN | TEMPERATURE: 97.1 F

## 2018-01-26 DIAGNOSIS — R19.4 CHANGE IN BOWEL HABITS: ICD-10-CM

## 2018-01-26 DIAGNOSIS — K58.0 IRRITABLE BOWEL SYNDROME WITH DIARRHEA: ICD-10-CM

## 2018-01-26 DIAGNOSIS — K21.00 GASTROESOPHAGEAL REFLUX DISEASE WITH ESOPHAGITIS: ICD-10-CM

## 2018-01-26 DIAGNOSIS — R19.7 DIARRHEA, UNSPECIFIED TYPE: ICD-10-CM

## 2018-01-26 DIAGNOSIS — R10.84 GENERALIZED ABDOMINAL PAIN: ICD-10-CM

## 2018-01-26 DIAGNOSIS — Z86.010 HISTORY OF COLON POLYPS: ICD-10-CM

## 2018-01-26 LAB — GLUCOSE BLDC GLUCOMTR-MCNC: 179 MG/DL (ref 70–130)

## 2018-01-26 PROCEDURE — 82962 GLUCOSE BLOOD TEST: CPT

## 2018-01-26 PROCEDURE — 88305 TISSUE EXAM BY PATHOLOGIST: CPT | Performed by: INTERNAL MEDICINE

## 2018-01-26 PROCEDURE — 43239 EGD BIOPSY SINGLE/MULTIPLE: CPT | Performed by: INTERNAL MEDICINE

## 2018-01-26 PROCEDURE — 45385 COLONOSCOPY W/LESION REMOVAL: CPT | Performed by: INTERNAL MEDICINE

## 2018-01-26 PROCEDURE — 88342 IMHCHEM/IMCYTCHM 1ST ANTB: CPT | Performed by: PATHOLOGY

## 2018-01-26 PROCEDURE — 45380 COLONOSCOPY AND BIOPSY: CPT | Performed by: INTERNAL MEDICINE

## 2018-01-26 PROCEDURE — 25010000002 PROPOFOL 10 MG/ML EMULSION: Performed by: NURSE ANESTHETIST, CERTIFIED REGISTERED

## 2018-01-26 PROCEDURE — 88342 IMHCHEM/IMCYTCHM 1ST ANTB: CPT | Performed by: INTERNAL MEDICINE

## 2018-01-26 PROCEDURE — 88305 TISSUE EXAM BY PATHOLOGIST: CPT | Performed by: PATHOLOGY

## 2018-01-26 RX ORDER — PROPOFOL 10 MG/ML
VIAL (ML) INTRAVENOUS AS NEEDED
Status: DISCONTINUED | OUTPATIENT
Start: 2018-01-26 | End: 2018-01-26 | Stop reason: SURG

## 2018-01-26 RX ORDER — DEXTROSE AND SODIUM CHLORIDE 5; .45 G/100ML; G/100ML
20 INJECTION, SOLUTION INTRAVENOUS CONTINUOUS
Status: DISCONTINUED | OUTPATIENT
Start: 2018-01-26 | End: 2018-01-26 | Stop reason: HOSPADM

## 2018-01-26 RX ORDER — LIDOCAINE HYDROCHLORIDE 10 MG/ML
INJECTION, SOLUTION INFILTRATION; PERINEURAL AS NEEDED
Status: DISCONTINUED | OUTPATIENT
Start: 2018-01-26 | End: 2018-01-26 | Stop reason: SURG

## 2018-01-26 RX ADMIN — PROPOFOL 70 MG: 10 INJECTION, EMULSION INTRAVENOUS at 14:58

## 2018-01-26 RX ADMIN — PROPOFOL 30 MG: 10 INJECTION, EMULSION INTRAVENOUS at 15:00

## 2018-01-26 RX ADMIN — PROPOFOL 30 MG: 10 INJECTION, EMULSION INTRAVENOUS at 15:06

## 2018-01-26 RX ADMIN — PROPOFOL 30 MG: 10 INJECTION, EMULSION INTRAVENOUS at 15:04

## 2018-01-26 RX ADMIN — DEXTROSE AND SODIUM CHLORIDE 20 ML/HR: 5; 450 INJECTION, SOLUTION INTRAVENOUS at 13:21

## 2018-01-26 RX ADMIN — LIDOCAINE HYDROCHLORIDE 100 MG: 10 INJECTION, SOLUTION INFILTRATION; PERINEURAL at 14:58

## 2018-01-26 RX ADMIN — PROPOFOL 30 MG: 10 INJECTION, EMULSION INTRAVENOUS at 15:09

## 2018-01-26 RX ADMIN — PROPOFOL 20 MG: 10 INJECTION, EMULSION INTRAVENOUS at 15:11

## 2018-01-26 RX ADMIN — PROPOFOL 30 MG: 10 INJECTION, EMULSION INTRAVENOUS at 15:02

## 2018-01-26 NOTE — ANESTHESIA POSTPROCEDURE EVALUATION
Patient: Mila Fisher    Procedure Summary     Date Anesthesia Start Anesthesia Stop Room / Location    01/26/18 1455 151 [facility] ENDOSCOPY 3 / [facility] ENDOSCOPY       Procedure Diagnosis Surgeon Provider    ESOPHAGOGASTRODUODENOSCOPY--urgent, bx (N/A Esophagus); COLONOSCOPY--bx (N/A ) Generalized abdominal pain; Change in bowel habits; Irritable bowel syndrome with diarrhea; History of colon polyps; Gastroesophageal reflux disease with esophagitis; Diarrhea, unspecified type  (Generalized abdominal pain [R10.84]; Change in bowel habits [R19.4]; Irritable bowel syndrome with diarrhea [K58.0]; History of colon polyps [Z86.010]; Gastroesophageal reflux disease with esophagitis [K21.0]; Diarrhea, unspecified type [R19.7]) MD Trina Parr CRNA          Anesthesia Type: MAC  Last vitals  BP   157/75 (01/26/18 1306)   Temp   96.9 °F (36.1 °C) (01/26/18 1306)   Pulse   59 (01/26/18 1306)   Resp   18 (01/26/18 1306)     SpO2   97 % (01/26/18 1306)     Post Anesthesia Care and Evaluation    Patient location during evaluation: bedside  Patient participation: waiting for patient participation  Level of consciousness: sleepy but conscious  Pain score: 0  Pain management: adequate  Airway patency: patent  Anesthetic complications: No anesthetic complications  PONV Status: none  Cardiovascular status: acceptable  Respiratory status: acceptable  Hydration status: acceptable

## 2018-01-26 NOTE — H&P (VIEW-ONLY)
Chief Complaint   Patient presents with   • Nausea   • Vomiting   • Abdominal Pain   • Irritable Bowel Syndrome   • Heartburn   • Diarrhea       ENDO PROCEDURE ORDERED: EGD/COLON, GERD, N, Abd pain, Diarrhea, change bowels, hx polyp    Subjective    Mila Fisher is a 62 y.o. female. she is here today for follow-up.    History of Present Illness    HISTORY:  Patient presents for follow up on her GERD, IBS, nausea, and abdominal pain.  Last seen on 03/15/2017 .  Patient presents complaining for the past month everything she eats bothers her stomach.  She will eat and have urgent diarrhea.  She complains of 5 out of 10 abdominal pain today.  She is having nausea, but no vomiting.  Denies dysphagia.  She is on Protonix 40 mg daily.  She is having a lot of cramping in the low abdomen.  She states she is still taking her Bentyl 20 mg q.i.d.  No blood or mucus in her stool.  Weight is up 1 pound since last visit.  She denies any recent antibiotics.  She denies fever.  She had her previous negative hepatitis diagnostic panel on 09/11/2017.  Patient states she did not return for follow up until today because she had her toenails removed and had difficulty with that.  She has had problems with her right leg.  She had a previous EGD/colonoscopy on 06/10/2015 that showed esophagitis, gastritis, colon polyp, hemorrhoids.     Most recent laboratory at [facility] with Casey López MD on 11/14/2017 showed an A1c of 6.7%.  CMP showed a glucose of 220, BUN 23, creatinine 1.2, potassium 5.5, otherwise normal.  She had a previous CBC last in 2016 that was normal.      ASSESSMENT:  Increasing GERD, abdominal pain, change in bowel habits, increasing diarrhea, possible bacterial overgrowth.      PLAN:    1.  Recommend trial on Flagyl 500 mg b.i.d. for 10 days.    2.  Recommend EGD/colonoscopy with biopsies to further evaluate given her history of colon polyp, change in bowel habits, diarrhea, and abdominal pain.     3.  Otherwise, will continue on current medications.    4.  She is asked to follow up after the above, further pending clinical course and the results of the above.                    The following portions of the patient's history were reviewed and updated as appropriate:   Past Medical History:   Diagnosis Date   • Acute bronchitis    • Anxiety     Anxiety state      • Arthropathy of lumbar facet joint    • Asthma    • Astigmatism    • At risk for adverse drug event     Adverse drug event resulting from treatment of disorder      • Back ache    • Benign essential hypertension    • Cholecystitis     mild on ultrasound      • Chronic back pain    • Contact dermatitis    • COPD (chronic obstructive pulmonary disease)    • Cough    • Depression     Depressive disorder, not elsewhere classified      • Diabetes mellitus     no retinopathy      • Diabetes mellitus without complication     type II or unspecified type, not stated as uncontrolled      • Diarrhea    • Drug therapy     Other long term (current) drug therapy      • Dyspnea    • Electrocardiogram abnormal    • Epigastric pain    • Episodic mood disorder     Unspecified    • Esophageal reflux    • Esophagitis     grade II   • Foot pain     VS SMALL PHALANX AVULSION      • Generalized abdominal pain    • Generalized anxiety disorder    • History of colon polyps    • Hypermetropia    • Irritable bowel syndrome    • Knee pain 03/22/2016     being evaluated for knee replacements.       • Malaise and fatigue    • Myofascial pain    • Nausea and vomiting    • Neck pain    • Onychomycosis    • LJ (obstructive sleep apnea)    • Osteoarthritis    • Pain in wrist    • Polyp of intestine     large intestine   • Pure hypercholesterolemia    • Right upper quadrant pain    • Screening for hyperlipidemia    • Shoulder pain 09/02/2014    possible Rotator Cuff Tendinitis      • Spasm of back muscles    • Sprain of sacroiliac ligament    • Transient cerebral ischemia    • Type  2 diabetes mellitus    • Type II diabetes mellitus, uncontrolled    • Upper respiratory infection    • Vitamin D deficiency      Past Surgical History:   Procedure Laterality Date   • CARPAL TUNNEL RELEASE     • COLONOSCOPY  06/10/2015   • COLONOSCOPY W/ POLYPECTOMY  06/10/2015    Colonoscopy remove polyps 54804 (1)      • ENDOSCOPY  06/10/2015    EGD w/ biopsy 29543 (1)      • INJECTION OF MEDICATION  09/02/2014    Drain/Inject Intermed Joint 20605 (1)      • TUBAL ABDOMINAL LIGATION      Tubal ligation (1)      • UPPER GASTROINTESTINAL ENDOSCOPY  06/10/2015     Family History   Problem Relation Age of Onset   • Heart disease Other    • Bone cancer Other    • Lung cancer Other    • Throat cancer Other    • Cancer Other      Other   • Colon cancer Other      Colorectal Cancer   • Diabetes Other    • Hypertension Other      OB History     No data available        No Known Allergies  Social History     Social History   • Marital status:      Spouse name: N/A   • Number of children: N/A   • Years of education: N/A     Social History Main Topics   • Smoking status: Former Smoker     Quit date: 1996   • Smokeless tobacco: Never Used      Comment: non smoker for years   • Alcohol use No   • Drug use: No   • Sexual activity: Defer     Other Topics Concern   • None     Social History Narrative       Current Outpatient Prescriptions:   •  acetaminophen (TYLENOL 8 HOUR) 650 MG 8 hr tablet, Take 1 tablet by mouth Every 8 (Eight) Hours As Needed for Mild Pain ., Disp: 60 tablet, Rfl: 1  •  albuterol (PROVENTIL HFA;VENTOLIN HFA) 108 (90 BASE) MCG/ACT inhaler, Inhale 2 puffs Every 4 (Four) Hours As Needed for wheezing., Disp: , Rfl:   •  albuterol (PROVENTIL) (2.5 MG/3ML) 0.083% nebulizer solution, Take 2.5 mg by nebulization Every 4 (Four) Hours As Needed for wheezing., Disp: , Rfl:   •  baclofen (LIORESAL) 10 MG tablet, Take 10 mg by mouth., Disp: , Rfl:   •  buPROPion XL (WELLBUTRIN XL) 150 MG 24 hr tablet, Take 1  "tablet by mouth Daily., Disp: 30 tablet, Rfl: 4  •  clopidogrel (PLAVIX) 75 MG tablet, Take 75 mg by mouth Daily., Disp: , Rfl:   •  dicyclomine (BENTYL) 20 MG tablet, Take 1 tablet by mouth Every 6 (Six) Hours., Disp: 90 tablet, Rfl: 5  •  furosemide (LASIX) 40 MG tablet, Take 40 mg by mouth Daily., Disp: , Rfl:   •  glucose blood (FREESTYLE LITE) test strip, 1 each by Other route As Needed. Use as instructed, Disp: , Rfl:   •  glucose blood test strip, Test tid dx code 250.00 on insulin, Disp: , Rfl:   •  HYDROcodone-acetaminophen (NORCO) 7.5-325 MG per tablet, Take 1 tablet by mouth Every 6 (Six) Hours As Needed., Disp: , Rfl:   •  insulin aspart prot-insulin aspart (NOVOLOG MIX 70/30) (70-30) 100 UNIT/ML injection, INJECT 25 UNITS INTO THE SKIN THREE TIMES A DAY BEFORE MEALS, Disp: , Rfl:   •  Insulin Syringe-Needle U-100 (GNP INSULIN SYRINGE) 31G X 5/16\" 0.3 ML misc, , Disp: , Rfl:   •  isosorbide mononitrate (IMDUR) 30 MG 24 hr tablet, Take 30 mg by mouth Daily., Disp: , Rfl:   •  Lancets (ACCU-CHEK MULTICLIX) lancets, Use to test three times a day, Disp: , Rfl:   •  lisinopril (PRINIVIL,ZESTRIL) 5 MG tablet, Take 10 mg by mouth Daily., Disp: , Rfl:   •  magnesium oxide (MAGOX) 400 (241.3 MG) MG tablet tablet, Take 400 mg by mouth Daily., Disp: , Rfl:   •  meloxicam (MOBIC) 15 MG tablet, Take 15 mg by mouth Daily., Disp: , Rfl:   •  metFORMIN (GLUCOPHAGE) 1000 MG tablet, Take 1,000 mg by mouth 2 (Two) Times a Day With Meals., Disp: , Rfl:   •  metoprolol succinate XL (TOPROL-XL) 25 MG 24 hr tablet, Take 12.5 mg by mouth Daily., Disp: , Rfl:   •  pantoprazole (PROTONIX) 40 MG EC tablet, Take 1 tablet by mouth Daily., Disp: 30 tablet, Rfl: 5  •  pravastatin (PRAVACHOL) 40 MG tablet, Take 40 mg by mouth Daily., Disp: , Rfl:   •  spironolactone (ALDACTONE) 25 MG tablet, Take 25 mg by mouth Daily., Disp: , Rfl: 11  •  traZODone (DESYREL) 50 MG tablet, Take 50 mg by mouth Every Night., Disp: , Rfl:   •  TRUEPLUS " "INSULIN SYRINGE 31G X 5/16\" 0.5 ML misc, , Disp: , Rfl:   •  vitamin D (ERGOCALCIFEROL) 90650 UNITS capsule capsule, Take 50,000 Units by mouth 1 (One) Time Per Week., Disp: , Rfl:   •  metroNIDAZOLE (FLAGYL) 500 MG tablet, Take 1 tablet by mouth 2 (Two) Times a Day., Disp: 20 tablet, Rfl: 0  •  polyethylene glycol (GoLYTELY) 236 g solution, As directed per instruction sheet for colonoscopy, Disp: 4000 mL, Rfl: 0  Review of Systems  Review of Systems       Objective    /68 (BP Location: Left arm)  Pulse 62  Ht 160 cm (62.99\")  Wt 125 kg (275 lb 3.2 oz)  BMI 48.76 kg/m2  Physical Exam   Constitutional: She is oriented to person, place, and time. She appears well-developed and well-nourished. No distress.   Chronically ill   HENT:   Head: Normocephalic and atraumatic.   Eyes: EOM are normal. Pupils are equal, round, and reactive to light.   Neck: Normal range of motion.   Cardiovascular: Normal rate, regular rhythm and normal heart sounds.    Pulmonary/Chest: Effort normal and breath sounds normal.   Abdominal: Soft. Bowel sounds are normal. She exhibits no shifting dullness, no distension, no abdominal bruit, no ascites and no mass. There is no hepatosplenomegaly. There is tenderness. There is no rigidity, no rebound, no guarding and no CVA tenderness. A hernia is present. Hernia confirmed negative in the ventral area.   Obese, mild diffuse. Moderate/large ventral diastasis   Musculoskeletal: Normal range of motion.   Neurological: She is alert and oriented to person, place, and time.   Skin: Skin is warm and dry.   Psychiatric: She has a normal mood and affect. Her behavior is normal. Judgment and thought content normal.   Nursing note and vitals reviewed.    Assessment/Plan      1. Irritable bowel syndrome with diarrhea    2. Gastroesophageal reflux disease with esophagitis    3. Generalized abdominal pain    4. Diarrhea, unspecified type    5. History of colon polyps    6. Change in bowel habits    . "   Mila was seen today for nausea, vomiting, abdominal pain, irritable bowel syndrome, heartburn and diarrhea.    Diagnoses and all orders for this visit:    Irritable bowel syndrome with diarrhea  -     Case Request; Standing  -     dextrose 5 % and sodium chloride 0.45 % infusion; Infuse 30 mL/hr into a venous catheter Continuous As Needed (Start Prior to Procedure).  -     Case Request    Gastroesophageal reflux disease with esophagitis  -     Case Request; Standing  -     dextrose 5 % and sodium chloride 0.45 % infusion; Infuse 30 mL/hr into a venous catheter Continuous As Needed (Start Prior to Procedure).  -     Case Request    Generalized abdominal pain  -     Case Request; Standing  -     dextrose 5 % and sodium chloride 0.45 % infusion; Infuse 30 mL/hr into a venous catheter Continuous As Needed (Start Prior to Procedure).  -     Case Request    Diarrhea, unspecified type  -     Case Request; Standing  -     dextrose 5 % and sodium chloride 0.45 % infusion; Infuse 30 mL/hr into a venous catheter Continuous As Needed (Start Prior to Procedure).  -     Case Request    History of colon polyps  -     Case Request; Standing  -     dextrose 5 % and sodium chloride 0.45 % infusion; Infuse 30 mL/hr into a venous catheter Continuous As Needed (Start Prior to Procedure).  -     Case Request    Change in bowel habits  -     Case Request; Standing  -     dextrose 5 % and sodium chloride 0.45 % infusion; Infuse 30 mL/hr into a venous catheter Continuous As Needed (Start Prior to Procedure).  -     Case Request    Other orders  -     Obtain Informed Consent; Standing  -     POC Glucose Once; Standing  -     polyethylene glycol (GoLYTELY) 236 g solution; As directed per instruction sheet for colonoscopy  -     metroNIDAZOLE (FLAGYL) 500 MG tablet; Take 1 tablet by mouth 2 (Two) Times a Day.        Orders placed during this encounter include:  No orders of the defined types were placed in this encounter.      Medications  prescribed:  New Medications Ordered This Visit   Medications   • polyethylene glycol (GoLYTELY) 236 g solution     Sig: As directed per instruction sheet for colonoscopy     Dispense:  4000 mL     Refill:  0   • metroNIDAZOLE (FLAGYL) 500 MG tablet     Sig: Take 1 tablet by mouth 2 (Two) Times a Day.     Dispense:  20 tablet     Refill:  0     Discontinued Medications       Reason for Discontinue    atorvastatin (LIPITOR) 40 MG tablet Therapy completed        Requested Prescriptions     Signed Prescriptions Disp Refills   • polyethylene glycol (GoLYTELY) 236 g solution 4000 mL 0     Sig: As directed per instruction sheet for colonoscopy   • metroNIDAZOLE (FLAGYL) 500 MG tablet 20 tablet 0     Sig: Take 1 tablet by mouth 2 (Two) Times a Day.       Review and/or summary of lab tests, radiology, procedures, medications. Review and summary of old records and obtaining of history. The risks and benefits of my recommendations, as well as other treatment options were discussed with the patient today. Questions were answered.    Follow-up: Return if symptoms worsen or fail to improve, for After the above.     ESOPHAGOGASTRODUODENOSCOPY--urgent, bx (N/A), COLONOSCOPY--bx (N/A)      This document has been electronically signed by Jarett Mcdonough PA-C on January 8, 2018 5:16 PM      Results for orders placed or performed in visit on 10/31/17   [employer]ox[employer] Select 13 (MW) - Urine, Clean Catch   Result Value Ref Range    Report Summary FINAL    Results for orders placed or performed in visit on 09/11/17   Hepatitis panel, acute   Result Value Ref Range    Hepatitis C Ab Negative Negative    Hep A IgM Negative Negative    Hep B C IgM Negative Negative    Hepatitis B Surface Ag Negative Negative   Results for orders placed or performed in visit on 03/07/17   ToxIdenIve 13 (MW)   Result Value Ref Range    Report Summary FINAL     PDF Image .    Results for orders placed or performed during the hospital encounter of 02/08/17    Gold Top - SST   Result Value Ref Range    Extra Tube Hold for add-ons.    Green Top (Gel)   Result Value Ref Range    Extra Tube Hold for add-ons.    CK-MB   Result Value Ref Range    CKMB 0.68 0.00 - 5.00 ng/mL   CBC Auto Differential   Result Value Ref Range    WBC 4.83 3.20 - 9.80 10*3/mm3    RBC 4.41 3.77 - 5.16 10*6/mm3    Hemoglobin 12.1 12.0 - 15.5 g/dL    Hematocrit 36.7 35.0 - 45.0 %    MCV 83.2 80.0 - 98.0 fL    MCH 27.4 26.5 - 34.0 pg    MCHC 33.0 31.4 - 36.0 g/dL    RDW 13.3 11.5 - 14.5 %    RDW-SD 40.0 36.4 - 46.3 fl    MPV 10.8 8.0 - 12.0 fL    Platelets 126 (L) 150 - 450 10*3/mm3    Neutrophil % 49.3 37.0 - 80.0 %    Lymphocyte % 42.2 10.0 - 50.0 %    Monocyte % 5.8 0.0 - 12.0 %    Eosinophil % 2.1 0.0 - 7.0 %    Basophil % 0.4 0.0 - 2.0 %    Immature Grans % 0.2 0.0 - 0.5 %    Neutrophils, Absolute 2.38 2.00 - 8.60 10*3/mm3    Lymphocytes, Absolute 2.04 0.60 - 4.20 10*3/mm3    Monocytes, Absolute 0.28 0.00 - 0.90 10*3/mm3    Eosinophils, Absolute 0.10 0.00 - 0.70 10*3/mm3    Basophils, Absolute 0.02 0.00 - 0.20 10*3/mm3    Immature Grans, Absolute 0.01 0.00 - 0.02 10*3/mm3   CBC Auto Differential   Result Value Ref Range    WBC 6.83 3.20 - 9.80 10*3/mm3    RBC 3.77 3.77 - 5.16 10*6/mm3    Hemoglobin 10.6 (L) 12.0 - 15.5 g/dL    Hematocrit 30.9 (L) 35.0 - 45.0 %    MCV 82.0 80.0 - 98.0 fL    MCH 28.1 26.5 - 34.0 pg    MCHC 34.3 31.4 - 36.0 g/dL    RDW 14.3 11.5 - 14.5 %    RDW-SD 42.8 36.4 - 46.3 fl    MPV 11.2 8.0 - 12.0 fL    Platelets 166 150 - 450 10*3/mm3    Neutrophil % 50.1 37.0 - 80.0 %    Lymphocyte % 40.7 10.0 - 50.0 %    Monocyte % 6.3 0.0 - 12.0 %    Eosinophil % 2.3 0.0 - 7.0 %    Basophil % 0.3 0.0 - 2.0 %    Immature Grans % 0.3 0.0 - 0.5 %    Neutrophils, Absolute 3.42 2.00 - 8.60 10*3/mm3    Lymphocytes, Absolute 2.78 0.60 - 4.20 10*3/mm3    Monocytes, Absolute 0.43 0.00 - 0.90 10*3/mm3    Eosinophils, Absolute 0.16 0.00 - 0.70 10*3/mm3    Basophils, Absolute 0.02 0.00 - 0.20  10*3/mm3    Immature Grans, Absolute 0.02 0.00 - 0.02 10*3/mm3   Lavender Top   Result Value Ref Range    Extra Tube hold for add-on    Light Blue Top   Result Value Ref Range    Extra Tube hold for add-on    Troponin   Result Value Ref Range    Troponin I <0.012 <=0.034 ng/mL   Troponin   Result Value Ref Range    Troponin I <0.012 <=0.034 ng/mL   D-dimer, Quantitative   Result Value Ref Range    D-Dimer, Quantitative <270 0 - 470 ng/mL (FEU)   D-dimer, Quantitative   Result Value Ref Range    D-Dimer, Quantitative <270 0 - 470 ng/mL (FEU)   POC Glucose Fingerstick   Result Value Ref Range    Glucose 275 (H) 70 - 130 mg/dL   POC Glucose Fingerstick   Result Value Ref Range    Glucose 192 (H) 70 - 130 mg/dL   POC Glucose Fingerstick   Result Value Ref Range    Glucose 153 (H) 70 - 130 mg/dL   BNP   Result Value Ref Range    proBNP 284.0 0.0 - 900.0 pg/mL   CK   Result Value Ref Range    Creatine Kinase 75 30 - 135 U/L   Comprehensive Metabolic Panel   Result Value Ref Range    Glucose 181 (H) 60 - 100 mg/dL    BUN 23 (H) 7 - 21 mg/dL    Creatinine 1.02 (H) 0.50 - 1.00 mg/dL    Sodium 137 137 - 145 mmol/L    Potassium 3.9 3.5 - 5.1 mmol/L    Chloride 103 95 - 110 mmol/L    CO2 27.0 22.0 - 31.0 mmol/L    Calcium 9.1 8.4 - 10.2 mg/dL    Total Protein 6.2 (L) 6.3 - 8.6 g/dL    Albumin 3.60 3.40 - 4.80 g/dL    ALT (SGPT) 16 9 - 52 U/L    AST (SGOT) 15 14 - 36 U/L    Alkaline Phosphatase 48 38 - 126 U/L    Total Bilirubin 0.3 0.2 - 1.3 mg/dL    eGFR Non  Amer 55 45 - 104 mL/min/1.73    Globulin 2.6 2.3 - 3.5 gm/dL    A/G Ratio 1.4 1.1 - 1.8 g/dL    BUN/Creatinine Ratio 22.5 7.0 - 25.0    Anion Gap 7.0 5.0 - 15.0 mmol/L   Comprehensive Metabolic Panel   Result Value Ref Range    Glucose 192 (H) 60 - 100 mg/dL    BUN 23 (H) 7 - 21 mg/dL    Creatinine 1.06 (H) 0.50 - 1.00 mg/dL    Sodium 140 137 - 145 mmol/L    Potassium 3.9 3.5 - 5.1 mmol/L    Chloride 103 95 - 110 mmol/L    CO2 28.0 22.0 - 31.0 mmol/L    Calcium 9.4  8.4 - 10.2 mg/dL    Total Protein 6.6 6.3 - 8.6 g/dL    Albumin 3.90 3.40 - 4.80 g/dL    ALT (SGPT) 26 9 - 52 U/L    AST (SGOT) 27 14 - 36 U/L    Alkaline Phosphatase 50 38 - 126 U/L    Total Bilirubin 0.3 0.2 - 1.3 mg/dL    eGFR Non  Amer 52 45 - 104 mL/min/1.73    Globulin 2.7 2.3 - 3.5 gm/dL    A/G Ratio 1.4 1.1 - 1.8 g/dL    BUN/Creatinine Ratio 21.7 7.0 - 25.0    Anion Gap 9.0 5.0 - 15.0 mmol/L     *Note: Due to a large number of results and/or encounters for the requested time period, some results have not been displayed. A complete set of results can be found in Results Review.       Some portions of this note have been dictated using voice recognition software and may contain errors and/or omissions.

## 2018-01-26 NOTE — ANESTHESIA PREPROCEDURE EVALUATION
Anesthesia Evaluation     NPO Solid Status: > 8 hours  NPO Liquid Status: > 4 hours     Airway   Mallampati: II  TM distance: >3 FB  Neck ROM: full  no difficulty expected  Dental      Pulmonary - normal exam   (+) COPD moderate, asthma, shortness of breath, recent URI resolved, sleep apnea,   Cardiovascular - normal exam    (+) hypertension well controlled, CHF, hyperlipidemia      Neuro/Psych  (+) TIA, CVA residual symptoms, psychiatric history Anxiety and Depression,     GI/Hepatic/Renal/Endo    (+)  GERD, diabetes mellitus type 2 poorly controlled,     Musculoskeletal     (+) back pain, neck pain,   Abdominal  - normal exam   Substance History      OB/GYN          Other   (+) arthritis                                           Anesthesia Plan    ASA 3     MAC     intravenous induction   Anesthetic plan and risks discussed with patient.

## 2018-01-26 NOTE — INTERVAL H&P NOTE
H&P reviewed. The patient was examined and there are no changes to the H&P.      Risks and benefits discussed with patient patient understands these and would like to proceed with procedure.

## 2018-01-26 NOTE — PLAN OF CARE
Problem: Patient Care Overview (Adult)  Goal: Plan of Care Review  Outcome: Ongoing (interventions implemented as appropriate)   01/26/18 1514   Coping/Psychosocial Response Interventions   Plan Of Care Reviewed With patient   Patient Care Overview   Progress no change       Problem: GI Endoscopy (Adult)  Goal: Signs and Symptoms of Listed Potential Problems Will be Absent or Manageable (GI Endoscopy)  Outcome: Ongoing (interventions implemented as appropriate)   01/26/18 1514   GI Endoscopy   Problems Assessed (GI Endoscopy) all   Problems Present (GI Endoscopy) none

## 2018-01-30 LAB
LAB AP CASE REPORT: NORMAL
LAB AP DIAGNOSIS COMMENT: NORMAL
Lab: NORMAL
PATH REPORT.FINAL DX SPEC: NORMAL
PATH REPORT.GROSS SPEC: NORMAL

## 2018-02-12 ENCOUNTER — OFFICE VISIT (OUTPATIENT)
Dept: GASTROENTEROLOGY | Facility: CLINIC | Age: 63
End: 2018-02-12

## 2018-02-12 VITALS
HEIGHT: 63 IN | SYSTOLIC BLOOD PRESSURE: 118 MMHG | WEIGHT: 285.2 LBS | DIASTOLIC BLOOD PRESSURE: 56 MMHG | HEART RATE: 63 BPM | BODY MASS INDEX: 50.53 KG/M2

## 2018-02-12 DIAGNOSIS — K63.5 HYPERPLASTIC COLONIC POLYP, UNSPECIFIED PART OF COLON: ICD-10-CM

## 2018-02-12 DIAGNOSIS — K21.00 GASTROESOPHAGEAL REFLUX DISEASE WITH ESOPHAGITIS: ICD-10-CM

## 2018-02-12 DIAGNOSIS — R19.7 DIARRHEA, UNSPECIFIED TYPE: ICD-10-CM

## 2018-02-12 DIAGNOSIS — K29.70 HELICOBACTER PYLORI GASTRITIS: Primary | ICD-10-CM

## 2018-02-12 DIAGNOSIS — B96.81 HELICOBACTER PYLORI GASTRITIS: Primary | ICD-10-CM

## 2018-02-12 PROCEDURE — 99214 OFFICE O/P EST MOD 30 MIN: CPT | Performed by: PHYSICIAN ASSISTANT

## 2018-02-12 RX ORDER — METRONIDAZOLE 500 MG/1
500 TABLET ORAL 2 TIMES DAILY
Qty: 28 TABLET | Refills: 0 | Status: SHIPPED | OUTPATIENT
Start: 2018-02-12 | End: 2018-03-16

## 2018-02-12 RX ORDER — CLARITHROMYCIN 500 MG/1
500 TABLET, COATED ORAL 2 TIMES DAILY
Qty: 28 TABLET | Refills: 0 | Status: SHIPPED | OUTPATIENT
Start: 2018-02-12 | End: 2018-03-16

## 2018-02-12 RX ORDER — ATORVASTATIN CALCIUM 40 MG/1
40 TABLET, FILM COATED ORAL NIGHTLY
COMMUNITY
Start: 2018-01-30 | End: 2019-04-09 | Stop reason: ALTCHOICE

## 2018-02-12 RX ORDER — OMEPRAZOLE 20 MG/1
20 CAPSULE, DELAYED RELEASE ORAL 2 TIMES DAILY
Qty: 30 CAPSULE | Refills: 0 | Status: SHIPPED | OUTPATIENT
Start: 2018-02-12 | End: 2018-03-27

## 2018-03-16 RX ORDER — PRAVASTATIN SODIUM 40 MG
40 TABLET ORAL DAILY
COMMUNITY

## 2018-03-19 ENCOUNTER — ANESTHESIA EVENT (OUTPATIENT)
Dept: PERIOP | Facility: HOSPITAL | Age: 63
End: 2018-03-19

## 2018-03-20 ENCOUNTER — ANESTHESIA (OUTPATIENT)
Dept: PERIOP | Facility: HOSPITAL | Age: 63
End: 2018-03-20

## 2018-03-20 ENCOUNTER — HOSPITAL ENCOUNTER (OUTPATIENT)
Facility: HOSPITAL | Age: 63
Setting detail: HOSPITAL OUTPATIENT SURGERY
Discharge: HOME | End: 2018-03-20
Attending: OPHTHALMOLOGY | Admitting: OPHTHALMOLOGY

## 2018-03-20 VITALS
SYSTOLIC BLOOD PRESSURE: 125 MMHG | TEMPERATURE: 98 F | HEIGHT: 63 IN | RESPIRATION RATE: 18 BRPM | WEIGHT: 279.98 LBS | OXYGEN SATURATION: 97 % | BODY MASS INDEX: 49.61 KG/M2 | DIASTOLIC BLOOD PRESSURE: 59 MMHG | HEART RATE: 59 BPM

## 2018-03-20 LAB — GLUCOSE BLDC GLUCOMTR-MCNC: 201 MG/DL (ref 70–130)

## 2018-03-20 PROCEDURE — 25010000002 FENTANYL CITRATE (PF) 100 MCG/2ML SOLUTION: Performed by: NURSE ANESTHETIST, CERTIFIED REGISTERED

## 2018-03-20 PROCEDURE — 25010000002 VANCOMYCIN PER 500 MG: Performed by: OPHTHALMOLOGY

## 2018-03-20 PROCEDURE — 82962 GLUCOSE BLOOD TEST: CPT

## 2018-03-20 PROCEDURE — V2632 POST CHMBR INTRAOCULAR LENS: HCPCS | Performed by: OPHTHALMOLOGY

## 2018-03-20 PROCEDURE — 25010000002 EPINEPHRINE PER 0.1 MG: Performed by: OPHTHALMOLOGY

## 2018-03-20 PROCEDURE — 25010000002 MIDAZOLAM PER 1 MG: Performed by: NURSE ANESTHETIST, CERTIFIED REGISTERED

## 2018-03-20 DEVICE — LENS ACRYSOF IQ 6X13MM SN60WF 22.0: Type: IMPLANTABLE DEVICE | Site: EYE | Status: FUNCTIONAL

## 2018-03-20 RX ORDER — MIDAZOLAM HYDROCHLORIDE 1 MG/ML
INJECTION INTRAMUSCULAR; INTRAVENOUS AS NEEDED
Status: DISCONTINUED | OUTPATIENT
Start: 2018-03-20 | End: 2018-03-20 | Stop reason: SURG

## 2018-03-20 RX ORDER — AMITRIPTYLINE HYDROCHLORIDE 50 MG/1
50 TABLET, FILM COATED ORAL
COMMUNITY
Start: 2018-02-13

## 2018-03-20 RX ORDER — PREDNISOLONE ACETATE 10 MG/ML
SUSPENSION/ DROPS OPHTHALMIC AS NEEDED
Status: DISCONTINUED | OUTPATIENT
Start: 2018-03-20 | End: 2018-03-20 | Stop reason: HOSPADM

## 2018-03-20 RX ORDER — CYCLOPENTOLATE HYDROCHLORIDE 10 MG/ML
1 SOLUTION/ DROPS OPHTHALMIC
Status: COMPLETED | OUTPATIENT
Start: 2018-03-20 | End: 2018-03-20

## 2018-03-20 RX ORDER — TETRACAINE HYDROCHLORIDE 5 MG/ML
1 SOLUTION OPHTHALMIC AS NEEDED
Status: COMPLETED | OUTPATIENT
Start: 2018-03-20 | End: 2018-03-20

## 2018-03-20 RX ORDER — PHENYLEPHRINE HCL 2.5 %
1 DROPS OPHTHALMIC (EYE)
Status: COMPLETED | OUTPATIENT
Start: 2018-03-20 | End: 2018-03-20

## 2018-03-20 RX ORDER — BRIMONIDINE TARTRATE 0.15 %
DROPS OPHTHALMIC (EYE) AS NEEDED
Status: DISCONTINUED | OUTPATIENT
Start: 2018-03-20 | End: 2018-03-20 | Stop reason: HOSPADM

## 2018-03-20 RX ORDER — SODIUM CHLORIDE 0.9 % (FLUSH) 0.9 %
10 SYRINGE (ML) INJECTION AS NEEDED
Status: DISCONTINUED | OUTPATIENT
Start: 2018-03-20 | End: 2018-03-20 | Stop reason: HOSPADM

## 2018-03-20 RX ORDER — FENTANYL CITRATE 50 UG/ML
INJECTION, SOLUTION INTRAMUSCULAR; INTRAVENOUS AS NEEDED
Status: DISCONTINUED | OUTPATIENT
Start: 2018-03-20 | End: 2018-03-20 | Stop reason: SURG

## 2018-03-20 RX ORDER — MOXIFLOXACIN 5 MG/ML
SOLUTION/ DROPS OPHTHALMIC AS NEEDED
Status: DISCONTINUED | OUTPATIENT
Start: 2018-03-20 | End: 2018-03-20 | Stop reason: HOSPADM

## 2018-03-20 RX ORDER — TETRACAINE HYDROCHLORIDE 5 MG/ML
SOLUTION OPHTHALMIC AS NEEDED
Status: DISCONTINUED | OUTPATIENT
Start: 2018-03-20 | End: 2018-03-20 | Stop reason: HOSPADM

## 2018-03-20 RX ORDER — BALANCED SALT SOLUTION ENRICHED WITH BICARBONATE, DEXTROSE, AND GLUTATHIONE
KIT INTRAOCULAR AS NEEDED
Status: DISCONTINUED | OUTPATIENT
Start: 2018-03-20 | End: 2018-03-20 | Stop reason: HOSPADM

## 2018-03-20 RX ADMIN — PHENYLEPHRINE HYDROCHLORIDE 1 DROP: 25 SOLUTION/ DROPS OPHTHALMIC at 07:04

## 2018-03-20 RX ADMIN — TETRACAINE HYDROCHLORIDE 1 DROP: 5 SOLUTION OPHTHALMIC at 06:44

## 2018-03-20 RX ADMIN — CYCLOPENTOLATE HYDROCHLORIDE 1 DROP: 10 SOLUTION/ DROPS OPHTHALMIC at 06:54

## 2018-03-20 RX ADMIN — CYCLOPENTOLATE HYDROCHLORIDE 1 DROP: 10 SOLUTION/ DROPS OPHTHALMIC at 06:44

## 2018-03-20 RX ADMIN — MIDAZOLAM 1 MG: 1 INJECTION INTRAMUSCULAR; INTRAVENOUS at 08:24

## 2018-03-20 RX ADMIN — SODIUM CHLORIDE, PRESERVATIVE FREE 10 ML: 5 INJECTION INTRAVENOUS at 07:02

## 2018-03-20 RX ADMIN — PHENYLEPHRINE HYDROCHLORIDE 1 DROP: 25 SOLUTION/ DROPS OPHTHALMIC at 06:44

## 2018-03-20 RX ADMIN — CYCLOPENTOLATE HYDROCHLORIDE 1 DROP: 10 SOLUTION/ DROPS OPHTHALMIC at 07:04

## 2018-03-20 RX ADMIN — FENTANYL CITRATE 50 MCG: 50 INJECTION, SOLUTION INTRAMUSCULAR; INTRAVENOUS at 08:24

## 2018-03-20 RX ADMIN — TETRACAINE HYDROCHLORIDE 1 DROP: 5 SOLUTION OPHTHALMIC at 07:04

## 2018-03-20 RX ADMIN — PHENYLEPHRINE HYDROCHLORIDE 1 DROP: 25 SOLUTION/ DROPS OPHTHALMIC at 06:54

## 2018-03-20 NOTE — ANESTHESIA PREPROCEDURE EVALUATION
Anesthesia Evaluation     Patient summary reviewed and Nursing notes reviewed   NPO Solid Status: > 8 hours  NPO Liquid Status: > 8 hours           Airway   Mallampati: II  TM distance: >3 FB  Neck ROM: full  possible difficult intubation  Dental    (+) edentulous    Pulmonary - normal exam    breath sounds clear to auscultation  (+) a smoker Former, COPD moderate, asthma, shortness of breath, recent URI resolved, sleep apnea,   Cardiovascular - normal exam    ECG reviewed  PT is on anticoagulation therapy  Patient on routine beta blocker and Beta blocker given within 24 hours of surgery  Rhythm: regular  Rate: normal    (+) hypertension, CHF, hyperlipidemia,   (-) murmur, carotid bruits    ROS comment: msSinus bradycardia with fusion   complexesLow voltage QRSBorderline ECGConfirmed by RIO EPPS, LUCY ([id number]),  KAILASH CHANEY ([id number]) on2/10/2017 7:35:27 PMReferred By:  AMARA           Confirmed By:LUCY PATE MD    Neuro/Psych  (+) TIA, CVA, psychiatric history Anxiety and Depression,     GI/Hepatic/Renal/Endo    (+) morbid obesity, GERD,  diabetes mellitus type 2 using insulin,     Musculoskeletal     (+) back pain, neck pain,   Abdominal   (+) obese,    Substance History - negative use     OB/GYN negative ob/gyn ROS         Other   (+) arthritis                     Anesthesia Plan    ASA 3     MAC     intravenous induction   Anesthetic plan and risks discussed with patient.

## 2018-03-20 NOTE — ANESTHESIA POSTPROCEDURE EVALUATION
Patient: Mila Fisher    Procedure Summary     Date:  03/20/18 Room / Location:  [facility] OR 06 / [facility] OR    Anesthesia Start:  0823 Anesthesia Stop:  0840    Procedure:  CATARACT PHACO EXTRACTION WITH INTRAOCULAR LENS IMPLANT (Right Eye) Diagnosis:       Age-related nuclear cataract of right eye      (Age-related nuclear cataract of right eye [H25.11])    Surgeon:  Mateus Palencia MD Provider:  Low De La Cruz MD    Anesthesia Type:  MAC ASA Status:  3          Anesthesia Type: MAC  Last vitals  BP   130/77 (03/20/18 0705)   Temp   97.4 °F (36.3 °C) (03/20/18 0705)   Pulse   65 (03/20/18 0705)   Resp   18 (03/20/18 0705)     SpO2         Post Anesthesia Care and Evaluation    Patient location during evaluation: PHASE II  Patient participation: complete - patient participated  Level of consciousness: awake and alert  Pain score: 0  Pain management: adequate  Airway patency: patent  Anesthetic complications: No anesthetic complications  PONV Status: none  Cardiovascular status: acceptable  Respiratory status: acceptable  Hydration status: acceptable

## 2018-03-27 ENCOUNTER — ANESTHESIA EVENT (OUTPATIENT)
Dept: PERIOP | Facility: HOSPITAL | Age: 63
End: 2018-03-27

## 2018-03-27 ENCOUNTER — ANESTHESIA (OUTPATIENT)
Dept: PERIOP | Facility: HOSPITAL | Age: 63
End: 2018-03-27

## 2018-03-27 ENCOUNTER — HOSPITAL ENCOUNTER (OUTPATIENT)
Facility: HOSPITAL | Age: 63
Setting detail: HOSPITAL OUTPATIENT SURGERY
Discharge: HOME | End: 2018-03-27
Attending: OPHTHALMOLOGY | Admitting: OPHTHALMOLOGY

## 2018-03-27 VITALS
RESPIRATION RATE: 18 BRPM | WEIGHT: 279.54 LBS | HEART RATE: 60 BPM | SYSTOLIC BLOOD PRESSURE: 156 MMHG | HEIGHT: 63 IN | BODY MASS INDEX: 49.53 KG/M2 | DIASTOLIC BLOOD PRESSURE: 73 MMHG | OXYGEN SATURATION: 95 % | TEMPERATURE: 97.5 F

## 2018-03-27 DIAGNOSIS — R11.0 NAUSEA: ICD-10-CM

## 2018-03-27 DIAGNOSIS — K21.00 GASTROESOPHAGEAL REFLUX DISEASE WITH ESOPHAGITIS: ICD-10-CM

## 2018-03-27 LAB — GLUCOSE BLDC GLUCOMTR-MCNC: 268 MG/DL (ref 70–130)

## 2018-03-27 PROCEDURE — 25010000002 FENTANYL CITRATE (PF) 100 MCG/2ML SOLUTION: Performed by: NURSE ANESTHETIST, CERTIFIED REGISTERED

## 2018-03-27 PROCEDURE — V2632 POST CHMBR INTRAOCULAR LENS: HCPCS | Performed by: OPHTHALMOLOGY

## 2018-03-27 PROCEDURE — 25010000002 EPINEPHRINE PER 0.1 MG: Performed by: OPHTHALMOLOGY

## 2018-03-27 PROCEDURE — 25010000002 MIDAZOLAM PER 1 MG: Performed by: NURSE ANESTHETIST, CERTIFIED REGISTERED

## 2018-03-27 PROCEDURE — 82962 GLUCOSE BLOOD TEST: CPT

## 2018-03-27 PROCEDURE — 25010000002 VANCOMYCIN PER 500 MG: Performed by: OPHTHALMOLOGY

## 2018-03-27 DEVICE — LENS ACRYSOF IQ 6X13MM SN60WF 22.5: Type: IMPLANTABLE DEVICE | Site: EYE | Status: FUNCTIONAL

## 2018-03-27 RX ORDER — SODIUM CHLORIDE 0.9 % (FLUSH) 0.9 %
10 SYRINGE (ML) INJECTION AS NEEDED
Status: DISCONTINUED | OUTPATIENT
Start: 2018-03-27 | End: 2018-03-27 | Stop reason: HOSPADM

## 2018-03-27 RX ORDER — CYCLOPENTOLATE HYDROCHLORIDE 10 MG/ML
1 SOLUTION/ DROPS OPHTHALMIC
Status: COMPLETED | OUTPATIENT
Start: 2018-03-27 | End: 2018-03-27

## 2018-03-27 RX ORDER — MIDAZOLAM HYDROCHLORIDE 1 MG/ML
INJECTION INTRAMUSCULAR; INTRAVENOUS AS NEEDED
Status: DISCONTINUED | OUTPATIENT
Start: 2018-03-27 | End: 2018-03-27 | Stop reason: SURG

## 2018-03-27 RX ORDER — PHENYLEPHRINE HCL 2.5 %
1 DROPS OPHTHALMIC (EYE)
Status: COMPLETED | OUTPATIENT
Start: 2018-03-27 | End: 2018-03-27

## 2018-03-27 RX ORDER — TETRACAINE HYDROCHLORIDE 5 MG/ML
SOLUTION OPHTHALMIC AS NEEDED
Status: DISCONTINUED | OUTPATIENT
Start: 2018-03-27 | End: 2018-03-27 | Stop reason: HOSPADM

## 2018-03-27 RX ORDER — FENTANYL CITRATE 50 UG/ML
INJECTION, SOLUTION INTRAMUSCULAR; INTRAVENOUS AS NEEDED
Status: DISCONTINUED | OUTPATIENT
Start: 2018-03-27 | End: 2018-03-27 | Stop reason: SURG

## 2018-03-27 RX ORDER — MOXIFLOXACIN 5 MG/ML
SOLUTION/ DROPS OPHTHALMIC AS NEEDED
Status: DISCONTINUED | OUTPATIENT
Start: 2018-03-27 | End: 2018-03-27 | Stop reason: HOSPADM

## 2018-03-27 RX ORDER — PREDNISOLONE ACETATE 10 MG/ML
SUSPENSION/ DROPS OPHTHALMIC AS NEEDED
Status: DISCONTINUED | OUTPATIENT
Start: 2018-03-27 | End: 2018-03-27 | Stop reason: HOSPADM

## 2018-03-27 RX ORDER — PANTOPRAZOLE SODIUM 40 MG/1
40 TABLET, DELAYED RELEASE ORAL DAILY
Qty: 30 TABLET | Refills: 0 | Status: SHIPPED | OUTPATIENT
Start: 2018-03-27 | End: 2019-04-09 | Stop reason: ALTCHOICE

## 2018-03-27 RX ORDER — TETRACAINE HYDROCHLORIDE 5 MG/ML
1 SOLUTION OPHTHALMIC
Status: COMPLETED | OUTPATIENT
Start: 2018-03-27 | End: 2018-03-27

## 2018-03-27 RX ORDER — BRIMONIDINE TARTRATE 0.15 %
DROPS OPHTHALMIC (EYE) AS NEEDED
Status: DISCONTINUED | OUTPATIENT
Start: 2018-03-27 | End: 2018-03-27 | Stop reason: HOSPADM

## 2018-03-27 RX ADMIN — PHENYLEPHRINE HYDROCHLORIDE 1 DROP: 25 SOLUTION/ DROPS OPHTHALMIC at 05:48

## 2018-03-27 RX ADMIN — PHENYLEPHRINE HYDROCHLORIDE 1 DROP: 25 SOLUTION/ DROPS OPHTHALMIC at 05:38

## 2018-03-27 RX ADMIN — SODIUM CHLORIDE, PRESERVATIVE FREE 10 ML: 5 INJECTION INTRAVENOUS at 05:47

## 2018-03-27 RX ADMIN — MIDAZOLAM 1 MG: 1 INJECTION INTRAMUSCULAR; INTRAVENOUS at 07:15

## 2018-03-27 RX ADMIN — FENTANYL CITRATE 50 MCG: 50 INJECTION, SOLUTION INTRAMUSCULAR; INTRAVENOUS at 07:10

## 2018-03-27 RX ADMIN — TETRACAINE HYDROCHLORIDE 1 DROP: 5 SOLUTION OPHTHALMIC at 05:58

## 2018-03-27 RX ADMIN — FENTANYL CITRATE 50 MCG: 50 INJECTION, SOLUTION INTRAMUSCULAR; INTRAVENOUS at 07:15

## 2018-03-27 RX ADMIN — MIDAZOLAM 1 MG: 1 INJECTION INTRAMUSCULAR; INTRAVENOUS at 07:10

## 2018-03-27 RX ADMIN — CYCLOPENTOLATE HYDROCHLORIDE 1 DROP: 10 SOLUTION/ DROPS OPHTHALMIC at 05:58

## 2018-03-27 RX ADMIN — TETRACAINE HYDROCHLORIDE 1 DROP: 5 SOLUTION OPHTHALMIC at 05:38

## 2018-03-27 RX ADMIN — CYCLOPENTOLATE HYDROCHLORIDE 1 DROP: 10 SOLUTION/ DROPS OPHTHALMIC at 05:38

## 2018-03-27 RX ADMIN — PHENYLEPHRINE HYDROCHLORIDE 1 DROP: 25 SOLUTION/ DROPS OPHTHALMIC at 05:58

## 2018-03-27 RX ADMIN — CYCLOPENTOLATE HYDROCHLORIDE 1 DROP: 10 SOLUTION/ DROPS OPHTHALMIC at 05:48

## 2018-03-27 NOTE — ANESTHESIA POSTPROCEDURE EVALUATION
Patient: Mila Fisher    Procedure Summary     Date:  03/27/18 Room / Location:  [facility] OR 06 / [facility] OR    Anesthesia Start:  0713 Anesthesia Stop:  0728    Procedure:  CATARACT PHACO EXTRACTION WITH INTRAOCULAR LENS IMPLANT (Left Eye) Diagnosis:       Age-related nuclear cataract of left eye      (Age-related nuclear cataract of left eye [H25.12])    Surgeon:  Mateus Palencia MD Provider:  Low De La Cruz MD    Anesthesia Type:  MAC ASA Status:  3          Anesthesia Type: MAC  Last vitals  BP   161/72 (03/27/18 0543)   Temp   97.4 °F (36.3 °C) (03/27/18 0543)   Pulse   67 (03/27/18 0543)   Resp   18 (03/27/18 0543)     SpO2   96 % (03/27/18 0543)     Post Anesthesia Care and Evaluation    Patient location during evaluation: PACU  Patient participation: complete - patient participated  Level of consciousness: awake and alert  Pain score: 0  Pain management: adequate  Airway patency: patent  Anesthetic complications: No anesthetic complications  PONV Status: none  Cardiovascular status: acceptable  Respiratory status: acceptable  Hydration status: acceptable

## 2018-05-23 DIAGNOSIS — K21.00 GASTROESOPHAGEAL REFLUX DISEASE WITH ESOPHAGITIS: ICD-10-CM

## 2018-05-23 DIAGNOSIS — R11.0 NAUSEA: ICD-10-CM

## 2018-05-24 RX ORDER — PANTOPRAZOLE SODIUM 40 MG/1
40 TABLET, DELAYED RELEASE ORAL DAILY
Qty: 1 TABLET | Refills: 0 | OUTPATIENT
Start: 2018-05-24

## 2018-07-20 DIAGNOSIS — E78.00 PURE HYPERCHOLESTEROLEMIA: ICD-10-CM

## 2018-07-20 RX ORDER — ATORVASTATIN CALCIUM 40 MG/1
40 TABLET, FILM COATED ORAL DAILY
Qty: 30 TABLET | Refills: 10 | OUTPATIENT
Start: 2018-07-20

## 2018-10-15 ENCOUNTER — OFFICE VISIT (OUTPATIENT)
Dept: SLEEP MEDICINE | Facility: HOSPITAL | Age: 63
End: 2018-10-15

## 2018-10-15 VITALS
WEIGHT: 281 LBS | DIASTOLIC BLOOD PRESSURE: 80 MMHG | SYSTOLIC BLOOD PRESSURE: 152 MMHG | HEART RATE: 99 BPM | OXYGEN SATURATION: 98 % | HEIGHT: 63 IN | BODY MASS INDEX: 49.79 KG/M2

## 2018-10-15 DIAGNOSIS — R11.0 NAUSEA: ICD-10-CM

## 2018-10-15 DIAGNOSIS — K21.00 GASTROESOPHAGEAL REFLUX DISEASE WITH ESOPHAGITIS: ICD-10-CM

## 2018-10-15 DIAGNOSIS — G47.33 OBSTRUCTIVE SLEEP APNEA, ADULT: Primary | ICD-10-CM

## 2018-10-15 DIAGNOSIS — E78.00 PURE HYPERCHOLESTEROLEMIA: ICD-10-CM

## 2018-10-15 PROCEDURE — 99213 OFFICE O/P EST LOW 20 MIN: CPT | Performed by: INTERNAL MEDICINE

## 2018-10-15 RX ORDER — PANTOPRAZOLE SODIUM 40 MG/1
40 TABLET, DELAYED RELEASE ORAL DAILY
Qty: 1 TABLET | Refills: 0 | OUTPATIENT
Start: 2018-10-15

## 2018-10-15 RX ORDER — ATORVASTATIN CALCIUM 40 MG/1
40 TABLET, FILM COATED ORAL DAILY
Qty: 30 TABLET | Refills: 10 | OUTPATIENT
Start: 2018-10-15

## 2018-10-15 NOTE — PROGRESS NOTES
Sleep Clinic Follow Up    Date: 10/15/2018  Primary Care Physician: Casey López MD    CC: Follow up: LJ    Assessment and Plan:    1. Obstructive sleep apnea  1. Malfunctioning machine  2. Script for auto 12-20 cm H2O  3. RTC in 31-90 days  2. COPD  1. Not smoking  3. DM  4. Morbid obesity  5. CHF  6. Sleep onset insomnia secondary to family stress      Interim History (1-3/4):  Since the last visit on 12/19/2017, patient has:    1) LJ -  Mila Fisher has not remained compliant with CPAP. Her machine water level is not changing but she is waking with dry mouth. She is using a FFM. I will order a new machine. Data card was not available.    Mask type: Full face mask  DME: [facility]    Bed time: 2200  Sleep latency: 20-60 minutes - stressed about son with gastric cancer  Number of times awakens during the night: 2-3  Wake time: 0600  Estimated total sleep time at night: 3-5 hours  Caffeine intake: 16oz of coffee, 0oz of tea, and 0oz of soda  Alcohol intake: 0 drinks per week  Nap time: throughout the day   Sleepiness with Driving: some    [place] - 3    2) Patient denies RLS symptoms.     PMHx, FH, SH reviewed and pertinent changes are: eye surgery    REVIEW OF SYSTEMS:   Negative for chest pain, fever, cough, SOA, abdominal pain. Smoking:none     Exam (6-11/12):  Vitals:    10/15/18 1349   BP: 152/80   Pulse: 99   SpO2: 98%     Body mass index is 49.78 kg/m². Patient's Body mass index is 49.78 kg/m². BMI is above normal parameters. Recommendations include: referral to primary care.    Gen:  No acute distress, alert, oriented  Lungs:  CTA with normal effort   CV:  RRR, no M/R/G  GI:  soft, non-tender  Psych:  Appropriate affect        Past Medical History:   Diagnosis Date   • Acute bronchitis    • Anxiety     Anxiety state      • Arthropathy of lumbar facet joint    • Asthma    • Astigmatism    • At risk for adverse drug event     Adverse drug event resulting from treatment of disorder       • Back ache    • Benign essential hypertension    • CHF (congestive heart failure) (CMS/[facility])    • Cholecystitis     mild on ultrasound      • Chronic back pain    • Contact dermatitis    • COPD (chronic obstructive pulmonary disease) (CMS/[facility])    • Cough    • Depression     Depressive disorder, not elsewhere classified      • Diabetes mellitus (CMS/[facility])     no retinopathy      • Diabetes mellitus without complication (CMS/[facility])     type II or unspecified type, not stated as uncontrolled      • Diarrhea    • Drug therapy     Other long term (current) drug therapy      • Dyspnea    • Electrocardiogram abnormal    • Epigastric pain    • Episodic mood disorder (CMS/[facility])     Unspecified    • Esophageal reflux    • Esophagitis     grade II   • Foot pain     VS SMALL PHALANX AVULSION      • Generalized abdominal pain    • Generalized anxiety disorder    • History of colon polyps    • Hypermetropia    • Irritable bowel syndrome    • Knee pain 03/22/2016     being evaluated for knee replacements.       • Malaise and fatigue    • Myofascial pain    • Nausea and vomiting    • Neck pain    • Onychomycosis    • LJ (obstructive sleep apnea)    • Osteoarthritis    • Pain in wrist    • Polyp of intestine     large intestine   • Pure hypercholesterolemia    • Right upper quadrant pain    • Screening for hyperlipidemia    • Shoulder pain 09/02/2014    possible Rotator Cuff Tendinitis      • Spasm of back muscles    • Sprain of sacroiliac ligament    • Stroke (CMS/[facility])    • Transient cerebral ischemia    • Type 2 diabetes mellitus (CMS/[facility])    • Type II diabetes mellitus, uncontrolled (CMS/[facility])    • Upper respiratory infection    • Vitamin D deficiency        Current Outpatient Prescriptions:   •  albuterol (PROVENTIL HFA;VENTOLIN HFA) 108 (90 BASE) MCG/ACT inhaler, Inhale 2 puffs Every 4 (Four) Hours As Needed for wheezing., Disp: , Rfl:   •  albuterol (PROVENTIL) (2.5 MG/3ML) 0.083% nebulizer solution, Take 2.5 mg by nebulization  "Every 4 (Four) Hours As Needed for wheezing., Disp: , Rfl:   •  amitriptyline (ELAVIL) 50 MG tablet, Take 50 mg by mouth every night at bedtime., Disp: , Rfl:   •  atorvastatin (LIPITOR) 40 MG tablet, 40 mg Every Night., Disp: , Rfl:   •  buPROPion XL (WELLBUTRIN XL) 150 MG 24 hr tablet, Take 1 tablet by mouth Daily., Disp: 30 tablet, Rfl: 4  •  clopidogrel (PLAVIX) 75 MG tablet, Take 75 mg by mouth Daily., Disp: , Rfl:   •  furosemide (LASIX) 40 MG tablet, Take 40 mg by mouth Daily., Disp: , Rfl:   •  glucose blood (FREESTYLE LITE) test strip, 1 each by Other route As Needed. Use as instructed, Disp: , Rfl:   •  glucose blood test strip, Test tid dx code 250.00 on insulin, Disp: , Rfl:   •  HYDROcodone-acetaminophen (NORCO) 7.5-325 MG per tablet, Take 1 tablet by mouth Every 6 (Six) Hours As Needed., Disp: , Rfl:   •  insulin aspart prot-insulin aspart (novoLOG 70/30) (70-30) 100 UNIT/ML injection, Inject 20 Units under the skin 3 (Three) Times a Day With Meals., Disp: , Rfl:   •  Insulin Syringe-Needle U-100 (GNP INSULIN SYRINGE) 31G X 5/16\" 0.3 ML misc, , Disp: , Rfl:   •  isosorbide mononitrate (IMDUR) 30 MG 24 hr tablet, Take 30 mg by mouth Daily., Disp: , Rfl:   •  Lancets (ACCU-CHEK MULTICLIX) lancets, Use to test three times a day, Disp: , Rfl:   •  lisinopril (PRINIVIL,ZESTRIL) 5 MG tablet, Take 5 mg by mouth Daily., Disp: , Rfl:   •  magnesium oxide (MAGOX) 400 (241.3 MG) MG tablet tablet, Take 400 mg by mouth Daily., Disp: , Rfl:   •  meloxicam (MOBIC) 15 MG tablet, Take 15 mg by mouth Daily., Disp: , Rfl:   •  metFORMIN (GLUCOPHAGE) 1000 MG tablet, Take 1,000 mg by mouth 2 (Two) Times a Day With Meals., Disp: , Rfl:   •  metoprolol succinate XL (TOPROL-XL) 25 MG 24 hr tablet, Take 12.5 mg by mouth Daily., Disp: , Rfl:   •  pantoprazole (PROTONIX) 40 MG EC tablet, TAKE 1 TABLET BY MOUTH DAILY., Disp: 30 tablet, Rfl: 0  •  pravastatin (PRAVACHOL) 40 MG tablet, Take 40 mg by mouth Daily., Disp: , Rfl:   •  " "spironolactone (ALDACTONE) 25 MG tablet, Take 25 mg by mouth Daily., Disp: , Rfl: 11  •  traZODone (DESYREL) 50 MG tablet, Take 50 mg by mouth Every Night., Disp: , Rfl:   •  TRUEPLUS INSULIN SYRINGE 31G X 5/16\" 0.5 ML misc, , Disp: , Rfl:   •  vitamin D (ERGOCALCIFEROL) 89316 UNITS capsule capsule, Take 50,000 Units by mouth 1 (One) Time Per Week., Disp: , Rfl:     Total time 25 min, more than half spent in face to face counseling and coordination of care.    Patient to follow up in 31-90 days with compliance report     This document has been electronically signed by Aric Aranda MD on October 15, 2018         CC: Casey López MD          No ref. provider found  "

## 2018-12-18 ENCOUNTER — OFFICE VISIT (OUTPATIENT)
Dept: SLEEP MEDICINE | Facility: HOSPITAL | Age: 63
End: 2018-12-18

## 2018-12-18 VITALS
OXYGEN SATURATION: 97 % | HEIGHT: 63 IN | WEIGHT: 289.4 LBS | HEART RATE: 80 BPM | SYSTOLIC BLOOD PRESSURE: 151 MMHG | DIASTOLIC BLOOD PRESSURE: 60 MMHG | BODY MASS INDEX: 51.28 KG/M2

## 2018-12-18 DIAGNOSIS — F51.04 PSYCHOPHYSIOLOGICAL INSOMNIA: ICD-10-CM

## 2018-12-18 DIAGNOSIS — G47.33 OBSTRUCTIVE SLEEP APNEA, ADULT: Primary | ICD-10-CM

## 2018-12-18 PROCEDURE — 99212 OFFICE O/P EST SF 10 MIN: CPT | Performed by: INTERNAL MEDICINE

## 2018-12-18 NOTE — PROGRESS NOTES
Sleep Clinic Follow Up    Date: 12/18/2018  Primary Care Physician: Casey López MD    Last office visit: 10/15/2018 (I reviewed this note)    CC: Follow up: LJ    Assessment and Plan:    1. Obstructive sleep apnea Established, stable (1)  1. Did not receive CPAP machine - ? Insurance vs DME issue  2. Insomnia - psychophysiologic  1. Family diagnosed with Cancer  3. COPD  4. DM  5. Obesity  6. CHF      Interim History (1-3/4):  Since the last visit:    1) LJ -  Mila Fisher has not remained compliant with CPAP. She has machine issues.    Bed time: 7522-0861  Sleep latency: 5-45 minutes  Number of times awakens during the night: 2-3  Wake time: 0300  Estimated total sleep time at night: 4-5 hours  Caffeine intake: 16oz of coffee, 0oz of tea, and 2oz of soda  Alcohol intake: 0 drinks per week  Nap time: rare   Sleepiness with Driving: none    [place] - 4    2) Patient has stable mild RLS symptoms.     PMHx, FH, SH reviewed and pertinent changes are: did not receive CPAP      REVIEW OF SYSTEMS:   Negative for chest pain, fever, cough, SOA, abdominal pain. Smoking:none    Exam (6-11/12):  Vitals:    12/18/18 0811   BP: 151/60   Pulse: 80   SpO2: 97%     Body mass index is 51.28 kg/m². Patient's Body mass index is 51.28 kg/m². BMI is above normal parameters. Recommendations include: referral to primary care.      Gen:  No acute distress, alert, oriented  Lungs:  CTA with normal effort   CV:  RRR, no M/R/G  GI:  soft, non-tender  Psych:  Appropriate affect      Past Medical History:   Diagnosis Date   • Acute bronchitis    • Anxiety     Anxiety state      • Arthropathy of lumbar facet joint    • Asthma    • Astigmatism    • At risk for adverse drug event     Adverse drug event resulting from treatment of disorder      • Back ache    • Benign essential hypertension    • CHF (congestive heart failure) (CMS/[facility])    • Cholecystitis     mild on ultrasound      • Chronic back pain    • Contact  dermatitis    • COPD (chronic obstructive pulmonary disease) (CMS/[facility])    • Cough    • Depression     Depressive disorder, not elsewhere classified      • Diabetes mellitus (CMS/[facility])     no retinopathy      • Diabetes mellitus without complication (CMS/[facility])     type II or unspecified type, not stated as uncontrolled      • Diarrhea    • Drug therapy     Other long term (current) drug therapy      • Dyspnea    • Electrocardiogram abnormal    • Epigastric pain    • Episodic mood disorder (CMS/[facility])     Unspecified    • Esophageal reflux    • Esophagitis     grade II   • Foot pain     VS SMALL PHALANX AVULSION      • Generalized abdominal pain    • Generalized anxiety disorder    • History of colon polyps    • Hypermetropia    • Irritable bowel syndrome    • Knee pain 03/22/2016     being evaluated for knee replacements.       • Malaise and fatigue    • Myofascial pain    • Nausea and vomiting    • Neck pain    • Onychomycosis    • LJ (obstructive sleep apnea)    • Osteoarthritis    • Pain in wrist    • Polyp of intestine     large intestine   • Pure hypercholesterolemia    • Right upper quadrant pain    • Screening for hyperlipidemia    • Shoulder pain 09/02/2014    possible Rotator Cuff Tendinitis      • Spasm of back muscles    • Sprain of sacroiliac ligament    • Stroke (CMS/[facility])    • Transient cerebral ischemia    • Type 2 diabetes mellitus (CMS/[facility])    • Type II diabetes mellitus, uncontrolled (CMS/[facility])    • Upper respiratory infection    • Vitamin D deficiency        Current Outpatient Medications:   •  albuterol (PROVENTIL HFA;VENTOLIN HFA) 108 (90 BASE) MCG/ACT inhaler, Inhale 2 puffs Every 4 (Four) Hours As Needed for wheezing., Disp: , Rfl:   •  albuterol (PROVENTIL) (2.5 MG/3ML) 0.083% nebulizer solution, Take 2.5 mg by nebulization Every 4 (Four) Hours As Needed for wheezing., Disp: , Rfl:   •  amitriptyline (ELAVIL) 50 MG tablet, Take 50 mg by mouth every night at bedtime., Disp: , Rfl:   •  atorvastatin  "(LIPITOR) 40 MG tablet, 40 mg Every Night., Disp: , Rfl:   •  buPROPion XL (WELLBUTRIN XL) 150 MG 24 hr tablet, Take 1 tablet by mouth Daily., Disp: 30 tablet, Rfl: 4  •  clopidogrel (PLAVIX) 75 MG tablet, Take 75 mg by mouth Daily., Disp: , Rfl:   •  furosemide (LASIX) 40 MG tablet, Take 40 mg by mouth Daily., Disp: , Rfl:   •  glucose blood (FREESTYLE LITE) test strip, 1 each by Other route As Needed. Use as instructed, Disp: , Rfl:   •  glucose blood test strip, Test tid dx code 250.00 on insulin, Disp: , Rfl:   •  HYDROcodone-acetaminophen (NORCO) 7.5-325 MG per tablet, Take 1 tablet by mouth Every 6 (Six) Hours As Needed., Disp: , Rfl:   •  insulin aspart prot-insulin aspart (novoLOG 70/30) (70-30) 100 UNIT/ML injection, Inject 20 Units under the skin 3 (Three) Times a Day With Meals., Disp: , Rfl:   •  Insulin Syringe-Needle U-100 (GNP INSULIN SYRINGE) 31G X 5/16\" 0.3 ML misc, , Disp: , Rfl:   •  isosorbide mononitrate (IMDUR) 30 MG 24 hr tablet, Take 30 mg by mouth Daily., Disp: , Rfl:   •  Lancets (ACCU-CHEK MULTICLIX) lancets, Use to test three times a day, Disp: , Rfl:   •  lisinopril (PRINIVIL,ZESTRIL) 5 MG tablet, Take 5 mg by mouth Daily., Disp: , Rfl:   •  magnesium oxide (MAGOX) 400 (241.3 MG) MG tablet tablet, Take 400 mg by mouth Daily., Disp: , Rfl:   •  meloxicam (MOBIC) 15 MG tablet, Take 15 mg by mouth Daily., Disp: , Rfl:   •  metFORMIN (GLUCOPHAGE) 1000 MG tablet, Take 1,000 mg by mouth 2 (Two) Times a Day With Meals., Disp: , Rfl:   •  metoprolol succinate XL (TOPROL-XL) 25 MG 24 hr tablet, Take 12.5 mg by mouth Daily., Disp: , Rfl:   •  pantoprazole (PROTONIX) 40 MG EC tablet, TAKE 1 TABLET BY MOUTH DAILY., Disp: 30 tablet, Rfl: 0  •  traZODone (DESYREL) 50 MG tablet, Take 50 mg by mouth Every Night., Disp: , Rfl:   •  TRUEPLUS INSULIN SYRINGE 31G X 5/16\" 0.5 ML misc, , Disp: , Rfl:   •  vitamin D (ERGOCALCIFEROL) 47314 UNITS capsule capsule, Take 50,000 Units by mouth 1 (One) Time Per " Week., Disp: , Rfl:   •  pravastatin (PRAVACHOL) 40 MG tablet, Take 40 mg by mouth Daily., Disp: , Rfl:   •  spironolactone (ALDACTONE) 25 MG tablet, Take 25 mg by mouth Daily., Disp: , Rfl: 11    Total time 15 min, more than half spent in face to face counseling and coordination of care.    RTC in 3 months     This document has been electronically signed by Aric Aranda MD on December 18, 2018         CC: Casey López MD          No ref. provider found

## 2019-04-09 ENCOUNTER — OFFICE VISIT (OUTPATIENT)
Dept: SLEEP MEDICINE | Facility: HOSPITAL | Age: 64
End: 2019-04-09

## 2019-04-09 VITALS
SYSTOLIC BLOOD PRESSURE: 175 MMHG | HEART RATE: 103 BPM | HEIGHT: 63 IN | WEIGHT: 289 LBS | OXYGEN SATURATION: 96 % | BODY MASS INDEX: 51.21 KG/M2 | DIASTOLIC BLOOD PRESSURE: 73 MMHG

## 2019-04-09 DIAGNOSIS — G47.33 OBSTRUCTIVE SLEEP APNEA, ADULT: Primary | ICD-10-CM

## 2019-04-09 PROCEDURE — 99213 OFFICE O/P EST LOW 20 MIN: CPT | Performed by: INTERNAL MEDICINE

## 2019-04-09 NOTE — PROGRESS NOTES
Sleep Clinic Follow Up    Date: 2019  Primary Care Physician: Casey López MD    Last office visit: 2018 (I reviewed this note)    CC: Follow up: LJ    Sleep Testin. Currently on 12-20 cm H2O    Assessment and Plan:    1. Obstructive sleep apnea Established, stable (1)  1. Compliant and improved with PAP therapy  2. Continue PAP as prescribed.   3. Script for PAP supplies  2. Insomnia Established, not controlled (2)  3. COPD, DM, CHF, Obesity Established, stable (1)        Interim History:  Since the last visit:    1) LJ -  Mila Fisher has remained compliant with CPAP. She denies mask and machine issues, dry mouth, headaches, or pressures intolerance. She denies abnormal dreams, sleep paralysis, nasal congestion, URI sx.    PAP Data:    Time frame: 2019 - 2019   Compliance 97.5 %  Average use on days used: 6hrs 34 min  Percent of days with usage greater than or equal to 4 hours: 95%  PAP range : 12-20 cm H2O  Average 90% pressure: 12.2 cmH2O  Leak: 110 minutes  Average AHI 1.1 events/hr  Mask type: Full face mask  DME: [facility]    Bed time routine unchanged    2) Patient denies RLS symptoms.     PMHx, FH, SH reviewed and pertinent changes are: unchanged from last office visit on 2018      REVIEW OF SYSTEMS:   Negative for chest pain, fever, cough, SOA, abdominal pain.       Exam:  Vitals:    19 1050   BP: 175/73   Pulse: 103   SpO2: 96%           19  1050   Weight: 131 kg (289 lb)     Body mass index is 51.21 kg/m². Patient's Body mass index is 51.21 kg/m². BMI is above normal parameters. Recommendations include: referral to primary care.      Gen:  No acute distress, alert, oriented  Lungs:  CTA with normal effort   CV:  RRR, no M/R/G  GI:  soft, non-tender  Psych:  Appropriate affect    Past Medical History:   Diagnosis Date   • Acute bronchitis    • Anxiety     Anxiety state      • Arthropathy of lumbar facet joint    • Asthma    •  Astigmatism    • At risk for adverse drug event     Adverse drug event resulting from treatment of disorder      • Back ache    • Benign essential hypertension    • CHF (congestive heart failure) (CMS/[facility])    • Cholecystitis     mild on ultrasound      • Chronic back pain    • Contact dermatitis    • COPD (chronic obstructive pulmonary disease) (CMS/[facility])    • Cough    • Depression     Depressive disorder, not elsewhere classified      • Diabetes mellitus (CMS/[facility])     no retinopathy      • Diabetes mellitus without complication (CMS/[facility])     type II or unspecified type, not stated as uncontrolled      • Diarrhea    • Drug therapy     Other long term (current) drug therapy      • Dyspnea    • Electrocardiogram abnormal    • Epigastric pain    • Episodic mood disorder (CMS/[facility])     Unspecified    • Esophageal reflux    • Esophagitis     grade II   • Foot pain     VS SMALL PHALANX AVULSION      • Generalized abdominal pain    • Generalized anxiety disorder    • History of colon polyps    • Hypermetropia    • Irritable bowel syndrome    • Knee pain 03/22/2016     being evaluated for knee replacements.       • Malaise and fatigue    • Myofascial pain    • Nausea and vomiting    • Neck pain    • Onychomycosis    • LJ (obstructive sleep apnea)    • Osteoarthritis    • Pain in wrist    • Polyp of intestine     large intestine   • Pure hypercholesterolemia    • Right upper quadrant pain    • Screening for hyperlipidemia    • Shoulder pain 09/02/2014    possible Rotator Cuff Tendinitis      • Spasm of back muscles    • Sprain of sacroiliac ligament    • Stroke (CMS/[facility])    • Transient cerebral ischemia    • Type 2 diabetes mellitus (CMS/[facility])    • Type II diabetes mellitus, uncontrolled (CMS/[facility])    • Upper respiratory infection    • Vitamin D deficiency        Current Outpatient Medications:   •  albuterol (PROVENTIL HFA;VENTOLIN HFA) 108 (90 BASE) MCG/ACT inhaler, Inhale 2 puffs Every 4 (Four) Hours As Needed for wheezing.,  "Disp: , Rfl:   •  albuterol (PROVENTIL) (2.5 MG/3ML) 0.083% nebulizer solution, Take 2.5 mg by nebulization Every 4 (Four) Hours As Needed for wheezing., Disp: , Rfl:   •  amitriptyline (ELAVIL) 50 MG tablet, Take 50 mg by mouth every night at bedtime., Disp: , Rfl:   •  furosemide (LASIX) 40 MG tablet, Take 40 mg by mouth Daily., Disp: , Rfl:   •  glucose blood (FREESTYLE LITE) test strip, 1 each by Other route As Needed. Use as instructed, Disp: , Rfl:   •  glucose blood test strip, Test tid dx code 250.00 on insulin, Disp: , Rfl:   •  HYDROcodone-acetaminophen (NORCO) 7.5-325 MG per tablet, Take 1 tablet by mouth Every 6 (Six) Hours As Needed., Disp: , Rfl:   •  insulin aspart prot-insulin aspart (novoLOG 70/30) (70-30) 100 UNIT/ML injection, Inject 20 Units under the skin 3 (Three) Times a Day With Meals., Disp: , Rfl:   •  Insulin Syringe-Needle U-100 (GNP INSULIN SYRINGE) 31G X 5/16\" 0.3 ML misc, , Disp: , Rfl:   •  Lancets (ACCU-CHEK MULTICLIX) lancets, Use to test three times a day, Disp: , Rfl:   •  lisinopril (PRINIVIL,ZESTRIL) 5 MG tablet, Take 5 mg by mouth Daily., Disp: , Rfl:   •  metFORMIN (GLUCOPHAGE) 1000 MG tablet, Take 1,000 mg by mouth 2 (Two) Times a Day With Meals., Disp: , Rfl:   •  pravastatin (PRAVACHOL) 40 MG tablet, Take 40 mg by mouth Daily., Disp: , Rfl:   •  traZODone (DESYREL) 50 MG tablet, Take 50 mg by mouth Every Night., Disp: , Rfl:   •  TRUEPLUS INSULIN SYRINGE 31G X 5/16\" 0.5 ML misc, , Disp: , Rfl:   •  vitamin D (ERGOCALCIFEROL) 16955 UNITS capsule capsule, Take 50,000 Units by mouth 1 (One) Time Per Week., Disp: , Rfl:     Total time 15 min, more than half spent in face to face counseling and coordination of care.    RTC in 12 months     This document has been electronically signed by Aric Aranda MD on April 9, 2019         CC: Casey López MD          No ref. provider found  "

## 2020-06-17 ENCOUNTER — OFFICE VISIT (OUTPATIENT)
Dept: SLEEP MEDICINE | Facility: HOSPITAL | Age: 65
End: 2020-06-17

## 2020-06-17 ENCOUNTER — DOCUMENTATION (OUTPATIENT)
Dept: SLEEP MEDICINE | Facility: HOSPITAL | Age: 65
End: 2020-06-17

## 2020-06-17 DIAGNOSIS — F51.04 PSYCHOPHYSIOLOGICAL INSOMNIA: ICD-10-CM

## 2020-06-17 DIAGNOSIS — G47.33 OBSTRUCTIVE SLEEP APNEA, ADULT: Primary | ICD-10-CM

## 2020-06-17 PROCEDURE — 99441 PR PHYS/QHP TELEPHONE EVALUATION 5-10 MIN: CPT | Performed by: NURSE PRACTITIONER

## 2020-06-17 NOTE — PROGRESS NOTES
Sleep Clinic Follow Up      You have chosen to receive care through a telephone visit. Do you consent to use a telephone visit for your medical care today? Yes    Date: 2020  Primary Care Physician: Casey López MD    Last office visit: 2019 (I reviewed this note)    CC: Follow up: LJ on CPAP      Interim History:  Since the last visit:    1) LJ -  Mila Fisher has remained compliant with CPAP. She denies mask and machine issues, dry mouth, headaches, or pressures intolerance. She denies abnormal dreams, sleep paralysis, nasal congestion, URI sx.    Sleep Testin. PSG in ~, AHI of ?   2. Currently on 12-20 cm H2O    PAP Data:    Time frame: 2020-2020   Compliance 99 %  Average use on days used: 7 hrs 22 min  Percent of days with usage greater than or equal to 4 hours: 93.2%  PAP range : 12-20 cm H2O  Average 90% pressure: 12.4 cmH2O  Leak: 2 hours 59 minutes  Average AHI 1.3 events/hr  Mask type: Full face mask  DME: [facility]    Bed time: 4339-1957  Sleep latency: 5-45 minutes  Number of times awakens during the night: 2-3  Wake time: 5671-3018  Estimated total sleep time at night: 6-8 hours  Caffeine intake: 1-2 cups of coffee, 0 cups of tea, and 1 sodas per day  Alcohol intake: 0 drinks per week  Nap time: most days   Sleepiness with Driving: none       2) Patient denies RLS symptoms.    PMHx, FH, SH reviewed and pertinent changes are: unchanged from last office visit on 2019      REVIEW OF SYSTEMS:   Negative for chest pain, SOA, fever, chills, cough, N/V/D, abdominal pain.    Smoking:none      Exam:  Unable to perform physical exam due to conducting telephone visit    Past Medical History:   Diagnosis Date   • Acute bronchitis    • Anxiety     Anxiety state      • Arthropathy of lumbar facet joint    • Asthma    • Astigmatism    • At risk for adverse drug event     Adverse drug event resulting from treatment of disorder      • Back ache    • Benign  essential hypertension    • CHF (congestive heart failure) (CMS/[facility])    • Cholecystitis     mild on ultrasound      • Chronic back pain    • Contact dermatitis    • COPD (chronic obstructive pulmonary disease) (CMS/[facility])    • Cough    • Depression     Depressive disorder, not elsewhere classified      • Diabetes mellitus (CMS/[facility])     no retinopathy      • Diabetes mellitus without complication (CMS/[facility])     type II or unspecified type, not stated as uncontrolled      • Diarrhea    • Drug therapy     Other long term (current) drug therapy      • Dyspnea    • Electrocardiogram abnormal    • Epigastric pain    • Episodic mood disorder (CMS/[facility])     Unspecified    • Esophageal reflux    • Esophagitis     grade II   • Foot pain     VS SMALL PHALANX AVULSION      • Generalized abdominal pain    • Generalized anxiety disorder    • History of colon polyps    • Hypermetropia    • Irritable bowel syndrome    • Knee pain 03/22/2016     being evaluated for knee replacements.       • Malaise and fatigue    • Myofascial pain    • Nausea and vomiting    • Neck pain    • Onychomycosis    • LJ (obstructive sleep apnea)    • Osteoarthritis    • Pain in wrist    • Polyp of intestine     large intestine   • Pure hypercholesterolemia    • Right upper quadrant pain    • Screening for hyperlipidemia    • Shoulder pain 09/02/2014    possible Rotator Cuff Tendinitis      • Spasm of back muscles    • Sprain of sacroiliac ligament    • Stroke (CMS/[facility])    • Transient cerebral ischemia    • Type 2 diabetes mellitus (CMS/[facility])    • Type II diabetes mellitus, uncontrolled (CMS/[facility])    • Upper respiratory infection    • Vitamin D deficiency        Current Outpatient Medications:   •  albuterol (PROVENTIL HFA;VENTOLIN HFA) 108 (90 BASE) MCG/ACT inhaler, Inhale 2 puffs Every 4 (Four) Hours As Needed for wheezing., Disp: , Rfl:   •  albuterol (PROVENTIL) (2.5 MG/3ML) 0.083% nebulizer solution, Take 2.5 mg by nebulization Every 4 (Four) Hours As  "Needed for wheezing., Disp: , Rfl:   •  amitriptyline (ELAVIL) 50 MG tablet, Take 50 mg by mouth every night at bedtime., Disp: , Rfl:   •  furosemide (LASIX) 40 MG tablet, Take 40 mg by mouth Daily., Disp: , Rfl:   •  glucose blood (FREESTYLE LITE) test strip, 1 each by Other route As Needed. Use as instructed, Disp: , Rfl:   •  glucose blood test strip, Test tid dx code 250.00 on insulin, Disp: , Rfl:   •  HYDROcodone-acetaminophen (NORCO) 7.5-325 MG per tablet, Take 1 tablet by mouth Every 6 (Six) Hours As Needed., Disp: , Rfl:   •  insulin aspart prot-insulin aspart (novoLOG 70/30) (70-30) 100 UNIT/ML injection, Inject 20 Units under the skin 3 (Three) Times a Day With Meals., Disp: , Rfl:   •  Insulin Syringe-Needle U-100 (GNP INSULIN SYRINGE) 31G X 5/16\" 0.3 ML misc, , Disp: , Rfl:   •  Lancets (ACCU-CHEK MULTICLIX) lancets, Use to test three times a day, Disp: , Rfl:   •  lisinopril (PRINIVIL,ZESTRIL) 5 MG tablet, Take 5 mg by mouth Daily., Disp: , Rfl:   •  metFORMIN (GLUCOPHAGE) 1000 MG tablet, Take 1,000 mg by mouth 2 (Two) Times a Day With Meals., Disp: , Rfl:   •  pravastatin (PRAVACHOL) 40 MG tablet, Take 40 mg by mouth Daily., Disp: , Rfl:   •  traZODone (DESYREL) 50 MG tablet, Take 50 mg by mouth Every Night., Disp: , Rfl:   •  TRUEPLUS INSULIN SYRINGE 31G X 5/16\" 0.5 ML misc, , Disp: , Rfl:   •  vitamin D (ERGOCALCIFEROL) 33772 UNITS capsule capsule, Take 50,000 Units by mouth 1 (One) Time Per Week., Disp: , Rfl:       Assessment and Plan:    1. Obstructive sleep apnea Established, stable (1)  1. Compliant with PAP therapy  2. Continue PAP as prescribed  3. Script for PAP supplies  4. Return to clinic in 1 year with compliance report unless change in symptoms in interim period  2. Insomnia - sleep onset and or maintenance Established, stable (1)    This visit has been rescheduled as a phone visit to comply with patient safety concerns in accordance with CDC recommendations. Total time of discussion " was 6 minutes.    4 of 6 minutes spent telephone counseling patient extensively regarding:   PAP therapy, PAP compliance and PAP maintenance      RTC in 12 months. Patient agrees to return sooner if changes in symptoms.        This document has been electronically signed by NANO Davis on June 17, 2020 14:05          CC: Casey López MD          No ref. provider found

## 2021-03-11 ENCOUNTER — OFFICE VISIT (OUTPATIENT)
Dept: GASTROENTEROLOGY | Facility: CLINIC | Age: 66
End: 2021-03-11

## 2021-03-11 VITALS
WEIGHT: 278.4 LBS | HEIGHT: 63 IN | DIASTOLIC BLOOD PRESSURE: 67 MMHG | SYSTOLIC BLOOD PRESSURE: 134 MMHG | BODY MASS INDEX: 49.33 KG/M2 | HEART RATE: 77 BPM

## 2021-03-11 DIAGNOSIS — Z86.010 HISTORY OF COLON POLYPS: ICD-10-CM

## 2021-03-11 DIAGNOSIS — K21.9 GASTROESOPHAGEAL REFLUX DISEASE, UNSPECIFIED WHETHER ESOPHAGITIS PRESENT: ICD-10-CM

## 2021-03-11 DIAGNOSIS — R19.4 CHANGE IN BOWEL HABITS: ICD-10-CM

## 2021-03-11 DIAGNOSIS — D50.8 OTHER IRON DEFICIENCY ANEMIA: Primary | ICD-10-CM

## 2021-03-11 DIAGNOSIS — Z80.0 FAMILY HISTORY OF GI MALIGNANCY: ICD-10-CM

## 2021-03-11 PROBLEM — D64.9 ABSOLUTE ANEMIA: Status: ACTIVE | Noted: 2021-03-11

## 2021-03-11 PROCEDURE — 99204 OFFICE O/P NEW MOD 45 MIN: CPT | Performed by: PHYSICIAN ASSISTANT

## 2021-03-11 RX ORDER — CLOPIDOGREL BISULFATE 75 MG/1
75 TABLET ORAL DAILY
COMMUNITY
Start: 2021-03-08

## 2021-03-11 RX ORDER — ROSUVASTATIN CALCIUM 10 MG/1
10 TABLET, COATED ORAL EVERY EVENING
COMMUNITY
Start: 2021-03-08

## 2021-03-11 RX ORDER — PRAVASTATIN SODIUM 40 MG
TABLET ORAL
COMMUNITY
Start: 2020-10-12 | End: 2021-03-11 | Stop reason: SDUPTHER

## 2021-03-11 RX ORDER — INSULIN GLARGINE 100 [IU]/ML
80 INJECTION, SOLUTION SUBCUTANEOUS NIGHTLY
COMMUNITY

## 2021-03-11 RX ORDER — DEXTROSE AND SODIUM CHLORIDE 5; .45 G/100ML; G/100ML
30 INJECTION, SOLUTION INTRAVENOUS CONTINUOUS PRN
Status: CANCELLED | OUTPATIENT
Start: 2021-03-24

## 2021-03-11 RX ORDER — LISINOPRIL 20 MG/1
20 TABLET ORAL DAILY
COMMUNITY
Start: 2021-03-08

## 2021-03-11 RX ORDER — SPIRONOLACTONE 25 MG/1
25 TABLET ORAL EVERY OTHER DAY
COMMUNITY
Start: 2021-03-08 | End: 2022-06-27 | Stop reason: ALTCHOICE

## 2021-03-11 RX ORDER — ISOSORBIDE MONONITRATE 30 MG/1
30 TABLET, EXTENDED RELEASE ORAL DAILY
COMMUNITY
Start: 2021-03-08

## 2021-03-11 NOTE — PROGRESS NOTES
Chief Complaint   Patient presents with   • Screening Colonoscopy     Ref. Sandra RICHARDSON PROCEDURE ORDERED: EGD/COLON gerd, hiccups, hx polyp, abd pain    Subjective    Mila Fisher is a 66 y.o. female. she is here today for follow-up.    History of Present Illness    Patient was sent for evaluation for colonoscopy by NANO Medina, who saw the patient on 02/24/2021. Laboratory at that time, A1c was 11.8%, cholesterol 204, triglycerides 182, . CBC showed anemia with hemoglobin 12.2, hematocrit 36.5, MCV 83.5. CMP showed glucose 299, BUN 22, creatinine 1.3, otherwise normal. I had previously seen the patient on 02/12/2018 and was treated for H. pylori but did not return for a followup. Last EGD/colonoscopy on 01/26/2018, showed gastritis and a polyp. She had a previous polyp in 2015.     Patient currently denies significant abdominal pain but she states she is having severe heartburn all the time. She feels like she is burning. She has frequent hiccups. She denied dysphagia. She has been having a lot of flatus. She states recently she had a change in her diabetic medications and she has had increasing problems with hemorrhoids. Weight is down 7 pounds since last visit.     ASSESSMENT/PLAN:  Patient does have moderately severe epigastric pain with a ventral diastasis; history of colon polyps, increasing GERD symptoms, variable bowel habits with anemia likely multifactorial. Given her persistent complaints recommend EGD/colonoscopy. We will do biopsies as indicated. We will see her followup after the above; further pending clinical course and the results of the above.     Thank you very much, Sandra, for this consultation and for allowing us to participate in the care of your patient. We will keep you informed.      The following portions of the patient's history were reviewed and updated as appropriate:   Past Medical History:   Diagnosis Date   • Acute bronchitis    • Anxiety      Anxiety state      • Arthropathy of lumbar facet joint    • Asthma    • Astigmatism    • At risk for adverse drug event     Adverse drug event resulting from treatment of disorder      • Back ache    • Benign essential hypertension    • CHF (congestive heart failure) (CMS/Formerly [facility])    • Cholecystitis     mild on ultrasound      • Chronic back pain    • Contact dermatitis    • COPD (chronic obstructive pulmonary disease) (CMS/Formerly [facility])    • Cough    • Depression     Depressive disorder, not elsewhere classified      • Diabetes mellitus (CMS/Formerly [facility])     no retinopathy      • Diabetes mellitus without complication (CMS/Formerly [facility])     type II or unspecified type, not stated as uncontrolled      • Diarrhea    • Drug therapy     Other long term (current) drug therapy      • Dyspnea    • Electrocardiogram abnormal    • Epigastric pain    • Episodic mood disorder (CMS/Formerly [facility])     Unspecified    • Esophageal reflux    • Esophagitis     grade II   • Foot pain     VS SMALL PHALANX AVULSION      • Generalized abdominal pain    • Generalized anxiety disorder    • History of colon polyps    • Hypermetropia    • Irritable bowel syndrome    • Knee pain 03/22/2016     being evaluated for knee replacements.       • Malaise and fatigue    • Myofascial pain    • Nausea and vomiting    • Neck pain    • Onychomycosis    • LJ (obstructive sleep apnea)    • Osteoarthritis    • Pain in wrist    • Polyp of intestine     large intestine   • Pure hypercholesterolemia    • Right upper quadrant pain    • Screening for hyperlipidemia    • Shoulder pain 09/02/2014    possible Rotator Cuff Tendinitis      • Spasm of back muscles    • Sprain of sacroiliac ligament    • Stroke (CMS/Formerly [facility])    • Transient cerebral ischemia    • Type 2 diabetes mellitus (CMS/Formerly [facility])    • Type II diabetes mellitus, uncontrolled (CMS/Formerly [facility])    • Upper respiratory infection    • Vitamin D deficiency      Past Surgical History:   Procedure Laterality Date   • CARPAL TUNNEL RELEASE     • CATARACT  EXTRACTION W/ INTRAOCULAR LENS IMPLANT Right 3/20/2018    Procedure: CATARACT PHACO EXTRACTION WITH INTRAOCULAR LENS IMPLANT;  Surgeon: Mateus Palencia MD;  Location: [facility] OR;  Service: Ophthalmology   • CATARACT EXTRACTION W/ INTRAOCULAR LENS IMPLANT Left 3/27/2018    Procedure: CATARACT PHACO EXTRACTION WITH INTRAOCULAR LENS IMPLANT;  Surgeon: Mateus Palencia MD;  Location: [facility] OR;  Service: Ophthalmology   • COLONOSCOPY  06/10/2015   • COLONOSCOPY N/A 2018    Procedure: COLONOSCOPY--bx;  Surgeon: Rg Cr MD;  Location: [facility] ENDOSCOPY;  Service:    • COLONOSCOPY W/ POLYPECTOMY  06/10/2015    Colonoscopy remove polyps 27979 (1)      • ENDOSCOPY  06/10/2015    EGD w/ biopsy 01806 (1)      • ENDOSCOPY N/A 2018    Procedure: ESOPHAGOGASTRODUODENOSCOPY--urgent, bx;  Surgeon: Rg Cr MD;  Location: [facility] ENDOSCOPY;  Service:    • INJECTION OF MEDICATION  2014    Drain/Inject Intermed Joint  (1)      • TUBAL ABDOMINAL LIGATION      Tubal ligation (1)      • UPPER GASTROINTESTINAL ENDOSCOPY  2018     Family History   Problem Relation Age of Onset   • Heart disease Other    • Bone cancer Other    • Lung cancer Other    • Throat cancer Other    • Cancer Other         Other   • Colon cancer Other         Colorectal Cancer   • Diabetes Other    • Hypertension Other      OB History    No obstetric history on file.       No Known Allergies  Social History     Socioeconomic History   • Marital status: Single     Spouse name: Not on file   • Number of children: Not on file   • Years of education: Not on file   • Highest education level: Not on file   Tobacco Use   • Smoking status: Former Smoker     Quit date:      Years since quittin.2   • Smokeless tobacco: Never Used   • Tobacco comment: non smoker for years   Vaping Use   • Vaping Use: Never used   Substance and Sexual Activity   • Alcohol use: No   • Drug use: No   • Sexual activity: Defer     Current  Medications:  Prior to Admission medications    Medication Sig Start Date End Date Taking? Authorizing Provider   amitriptyline (ELAVIL) 50 MG tablet Take 50 mg by mouth every night at bedtime. 2/13/18  Yes Irma Coker MD   Cholecalciferol (Vitamin D3) 1.25 MG (73882 UT) tablet Take 1 tablet by mouth 1 (One) Time Per Week.   Yes Irma Coker MD   clopidogrel (PLAVIX) 75 MG tablet Take 75 mg by mouth Daily. 3/8/21  Yes Irma Coker MD   furosemide (LASIX) 40 MG tablet Take 40 mg by mouth Daily.   Yes Irma Coker MD   glucose blood (FREESTYLE LITE) test strip 1 each by Other route As Needed. Use as instructed   Yes Irma Coker MD   glucose blood test strip Test tid dx code 250.00 on insulin 4/12/16  Yes Irma Coker MD   isosorbide mononitrate (IMDUR) 30 MG 24 hr tablet Take 30 mg by mouth Daily. 3/8/21  Yes Irma Coker MD   lisinopril (PRINIVIL,ZESTRIL) 20 MG tablet Take 20 mg by mouth Daily. 3/8/21  Yes Irma Coker MD   magnesium oxide (MAGOX) 400 (241.3 Mg) MG tablet tablet Take 400 mg by mouth 1 (One) Time. 12/28/20  Yes Irma Coker MD   pravastatin (PRAVACHOL) 40 MG tablet Take 40 mg by mouth Daily.   Yes Irma Coker MD   pravastatin (PRAVACHOL) 40 MG tablet TAKE ONE TABLET BY MOUTH EACH MORNING 10/12/20  Yes Irma Coker MD   rosuvastatin (CRESTOR) 10 MG tablet Take 10 mg by mouth Every Evening. 3/8/21  Yes Irma Coker MD   spironolactone (ALDACTONE) 25 MG tablet Take 25 mg by mouth Every Other Day. 3/8/21  Yes Irma Coker MD   albuterol (PROVENTIL HFA;VENTOLIN HFA) 108 (90 BASE) MCG/ACT inhaler Inhale 2 puffs Every 4 (Four) Hours As Needed for wheezing.    Irma Coker MD   albuterol (PROVENTIL) (2.5 MG/3ML) 0.083% nebulizer solution Take 2.5 mg by nebulization Every 4 (Four) Hours As Needed for wheezing.    Provider, Historical, MD   HYDROcodone-acetaminophen (NORCO) 7.5-325 MG  "per tablet Take 1 tablet by mouth Every 6 (Six) Hours As Needed. 4/11/17   Irma Coker MD   insulin aspart prot-insulin aspart (novoLOG 70/30) (70-30) 100 UNIT/ML injection Inject 20 Units under the skin 3 (Three) Times a Day With Meals.    Irma Coker MD   Insulin Syringe-Needle U-100 (GNP INSULIN SYRINGE) 31G X 5/16\" 0.3 ML misc     Irma Coker MD   Lancets (ACCU-CHEK MULTICLIX) lancets Use to test three times a day 6/16/15   Irma Coker MD   lisinopril (PRINIVIL,ZESTRIL) 5 MG tablet Take 5 mg by mouth Daily.    Irma Coker MD   metFORMIN (GLUCOPHAGE) 1000 MG tablet Take 1,000 mg by mouth 2 (Two) Times a Day With Meals.    Irma Coker MD   traZODone (DESYREL) 50 MG tablet Take 50 mg by mouth Every Night. 1/2/18   Irma Coker MD   TRUEPLUS INSULIN SYRINGE 31G X 5/16\" 0.5 ML misc  4/11/17   Irma Coker MD   vitamin D (ERGOCALCIFEROL) 59024 UNITS capsule capsule Take 50,000 Units by mouth 1 (One) Time Per Week.    Irma Coker MD     Review of Systems  Review of Systems   Constitutional: Negative for unexpected weight change.   HENT: Negative for trouble swallowing.    Gastrointestinal: Positive for abdominal pain, blood in stool, constipation, diarrhea, nausea and vomiting. Negative for abdominal distention, anal bleeding and rectal pain.          Objective    /67   Pulse 77   Ht 160 cm (63\")   Wt 126 kg (278 lb 6.4 oz)   BMI 49.32 kg/m²   Physical Exam  Vitals and nursing note reviewed.   Constitutional:       General: She is not in acute distress.     Appearance: She is well-developed.   HENT:      Head: Normocephalic and atraumatic.   Eyes:      Pupils: Pupils are equal, round, and reactive to light.   Cardiovascular:      Rate and Rhythm: Normal rate and regular rhythm.      Heart sounds: Normal heart sounds.   Pulmonary:      Effort: Pulmonary effort is normal.      Breath sounds: Normal breath sounds. "   Abdominal:      General: Bowel sounds are normal. There is distension. There is no abdominal bruit.      Palpations: Abdomen is soft. Abdomen is not rigid. There is no shifting dullness or mass.      Tenderness: There is abdominal tenderness. There is no guarding or rebound.      Hernia: No hernia is present. There is no hernia in the ventral area.      Comments: Mild diffuse   Musculoskeletal:         General: Normal range of motion.      Cervical back: Normal range of motion.   Skin:     General: Skin is warm and dry.   Neurological:      Mental Status: She is alert and oriented to person, place, and time.   Psychiatric:         Behavior: Behavior normal.         Thought Content: Thought content normal.         Judgment: Judgment normal.       Assessment/Plan      1. Other iron deficiency anemia    2. Gastroesophageal reflux disease, unspecified whether esophagitis present    3. Change in bowel habits    4. History of colon polyps    5. Family history of GI malignancy    .   Diagnoses and all orders for this visit:    1. Other iron deficiency anemia (Primary)  -     Case Request; Standing  -     dextrose 5 % and sodium chloride 0.45 % infusion  -     Case Request    2. Gastroesophageal reflux disease, unspecified whether esophagitis present  -     Case Request; Standing  -     dextrose 5 % and sodium chloride 0.45 % infusion  -     Case Request    3. Change in bowel habits  -     Case Request; Standing  -     dextrose 5 % and sodium chloride 0.45 % infusion  -     Case Request    4. History of colon polyps  -     Case Request; Standing  -     dextrose 5 % and sodium chloride 0.45 % infusion  -     Case Request    5. Family history of GI malignancy  -     Case Request; Standing  -     dextrose 5 % and sodium chloride 0.45 % infusion  -     Case Request    Other orders  -     Follow Anesthesia Guidelines / Standing Orders; Future  -     Obtain Informed Consent; Future  -     Obtain Informed Consent; Standing  -      POC Glucose Once; Standing        Orders placed during this encounter include:  Orders Placed This Encounter   Procedures   • Follow Anesthesia Guidelines / Standing Orders     Standing Status:   Future   • Obtain Informed Consent     Standing Status:   Future     Order Specific Question:   Informed Consent Given For     Answer:   ESOPHAGOGASTRODUODENOSCOPY and colonoscopy       Medications prescribed:  No orders of the defined types were placed in this encounter.      Requested Prescriptions      No prescriptions requested or ordered in this encounter       Review and/or summary of lab tests, radiology, procedures, medications. Review and summary of old records and obtaining of history. The risks and benefits of my recommendations, as well as other treatment options were discussed with the patient today. Questions were answered.    Follow-up: Return in about 1 month (around 4/11/2021), or if symptoms worsen or fail to improve.     ESOPHAGOGASTRODUODENOSCOPY (N/A), COLONOSCOPY (N/A)      This document has been electronically signed by Jarett Mcdonough PA-C on March 22, 2021 20:22 CDT      Results for orders placed or performed in visit on 03/21/21   COVID-19,APTIMA PANTHER,[facility]RI IN-HOUSE, NP/OP SWAB IN UTM/VTM/SALINE TRANSPORT MEDIA,24 HR TAT - Swab, Nasopharynx    Specimen: Nasopharynx; Swab   Result Value Ref Range    COVID19 Not Detected Not Detected - Ref. Range   Results for orders placed or performed during the hospital encounter of 03/27/18   POC Glucose Once    Specimen: Blood   Result Value Ref Range    Glucose 268 (H) 70 - 130 mg/dL   Results for orders placed or performed during the hospital encounter of 03/20/18   POC Glucose Once    Specimen: Blood   Result Value Ref Range    Glucose 201 (H) 70 - 130 mg/dL   Results for orders placed or performed during the hospital encounter of 01/26/18   Tissue Pathology Exam - Tissue, Gastric, Antrum    Specimen: A: Gastric, Antrum; Tissue    B: Small Intestine,  Duodenum; Tissue    C: Large Intestine; Tissue    D: Large Intestine, Sigmoid Colon; Tissue   Result Value Ref Range    Case Report       Surgical Pathology Report                         Case: [id number]                                  Authorizing Provider:  Rg Cr MD         Collected:           01/26/2018 03:03 PM          Ordering Location:     [facility]             Received:            01/27/2018 09:59 AM                                 [facility]                                                     Pathologist:           Elier Chris MD                                                          Specimens:   1) - Gastric, Antrum, antrum                                                                        2) - Small Intestine, Duodenum, small bowel                                                         3) - Large Intestine, colonic mucosa                                                                4) - Large Intestine, Sigmoid Colon, sigmoid poly cold snare                               Final Diagnosis       1.  MUCOSA, ANTRUM OF STOMACH:  ACTIVE CHRONIC GASTRITIS.  POSITIVE FOR HELICOBACTER PYLORI (HP IMMUNOSTAIN).    2.  MUCOSA, DUODENUM:  NO SIGNIFICANT HISTOLOGIC ABNORMALITY.    3.  MUCOSA, COLON:  NO SIGNIFICANT HISTOLOGIC ABNORMALITY.    4.  POLYP, SIGMOID COLON:  HYPERPLASTIC POLYP.      Comment       Helicobacter pylori (HP) immunostain is performed because an appropriate inflammatory milieu is present and organisms are not seen on H & E stained slides.    HP immunostain was developed and its performance characteristics determined by [facility].  It has not been cleared or approved by the U.S. Food and Drug Administration.  The FDA has determined that such clearance or approval is not necessary.  This test is used for clinical purposes.  It should not be regarded as investigational or for research.  This laboratory is certified  under the Clinical Laboratory Improvement Amendments of 1988 (CLIA-88) as qualified to perform high complexity clinical laboratory testing.          Gross Description       Received for examination are 4 containers, each of which have nodular bits of white soft tissue measuring 0.3-0.5 cc in aggregate.  All specimens are embedded as labeled.  1A antrum of stomach; 2A duodenum; 3A mucosa of colon; 4A polyp, sigmoid colon (cold snare).      Embedded Images     POC Glucose Once    Specimen: Blood   Result Value Ref Range    Glucose 179 (H) 70 - 130 mg/dL   Results for orders placed or performed in visit on 10/31/17   ToxASSURE Select 13 (MW) - Urine, Clean Catch    Specimen: Urine, Clean Catch   Result Value Ref Range    Report Summary FINAL    Results for orders placed or performed in visit on 09/11/17   Hepatitis panel, acute    Specimen: Blood   Result Value Ref Range    Hepatitis C Ab Negative Negative    Hep A IgM Negative Negative    Hep B C IgM Negative Negative    Hepatitis B Surface Ag Negative Negative   Results for orders placed or performed in visit on 03/07/17   ToxASSURE Select 13 (MW)    Specimen: Urine, Clean Catch   Result Value Ref Range    Report Summary FINAL     PDF Image .    Results for orders placed or performed during the hospital encounter of 02/08/17   Gold Top - SST   Result Value Ref Range    Extra Tube Hold for add-ons.    Green Top (Gel)   Result Value Ref Range    Extra Tube Hold for add-ons.    CK-MB    Specimen: Blood   Result Value Ref Range    CKMB 0.68 0.00 - 5.00 ng/mL   CBC Auto Differential    Specimen: Blood   Result Value Ref Range    WBC 4.83 3.20 - 9.80 10*3/mm3    RBC 4.41 3.77 - 5.16 10*6/mm3    Hemoglobin 12.1 12.0 - 15.5 g/dL    Hematocrit 36.7 35.0 - 45.0 %    MCV 83.2 80.0 - 98.0 fL    MCH 27.4 26.5 - 34.0 pg    MCHC 33.0 31.4 - 36.0 g/dL    RDW 13.3 11.5 - 14.5 %    RDW-SD 40.0 36.4 - 46.3 fl    MPV 10.8 8.0 - 12.0 fL    Platelets 126 (L) 150 - 450 10*3/mm3     Neutrophil % 49.3 37.0 - 80.0 %    Lymphocyte % 42.2 10.0 - 50.0 %    Monocyte % 5.8 0.0 - 12.0 %    Eosinophil % 2.1 0.0 - 7.0 %    Basophil % 0.4 0.0 - 2.0 %    Immature Grans % 0.2 0.0 - 0.5 %    Neutrophils, Absolute 2.38 2.00 - 8.60 10*3/mm3    Lymphocytes, Absolute 2.04 0.60 - 4.20 10*3/mm3    Monocytes, Absolute 0.28 0.00 - 0.90 10*3/mm3    Eosinophils, Absolute 0.10 0.00 - 0.70 10*3/mm3    Basophils, Absolute 0.02 0.00 - 0.20 10*3/mm3    Immature Grans, Absolute 0.01 0.00 - 0.02 10*3/mm3   CBC Auto Differential    Specimen: Blood   Result Value Ref Range    WBC 6.83 3.20 - 9.80 10*3/mm3    RBC 3.77 3.77 - 5.16 10*6/mm3    Hemoglobin 10.6 (L) 12.0 - 15.5 g/dL    Hematocrit 30.9 (L) 35.0 - 45.0 %    MCV 82.0 80.0 - 98.0 fL    MCH 28.1 26.5 - 34.0 pg    MCHC 34.3 31.4 - 36.0 g/dL    RDW 14.3 11.5 - 14.5 %    RDW-SD 42.8 36.4 - 46.3 fl    MPV 11.2 8.0 - 12.0 fL    Platelets 166 150 - 450 10*3/mm3    Neutrophil % 50.1 37.0 - 80.0 %    Lymphocyte % 40.7 10.0 - 50.0 %    Monocyte % 6.3 0.0 - 12.0 %    Eosinophil % 2.3 0.0 - 7.0 %    Basophil % 0.3 0.0 - 2.0 %    Immature Grans % 0.3 0.0 - 0.5 %    Neutrophils, Absolute 3.42 2.00 - 8.60 10*3/mm3    Lymphocytes, Absolute 2.78 0.60 - 4.20 10*3/mm3    Monocytes, Absolute 0.43 0.00 - 0.90 10*3/mm3    Eosinophils, Absolute 0.16 0.00 - 0.70 10*3/mm3    Basophils, Absolute 0.02 0.00 - 0.20 10*3/mm3    Immature Grans, Absolute 0.02 0.00 - 0.02 10*3/mm3   Lavender Top   Result Value Ref Range    Extra Tube hold for add-on    Light Blue Top   Result Value Ref Range    Extra Tube hold for add-on    Troponin    Specimen: Blood   Result Value Ref Range    Troponin I <0.012 <=0.034 ng/mL   Troponin    Specimen: Blood   Result Value Ref Range    Troponin I <0.012 <=0.034 ng/mL   D-dimer, Quantitative    Specimen: Blood   Result Value Ref Range    D-Dimer, Quantitative <270 0 - 470 ng/mL (FEU)   D-dimer, Quantitative    Specimen: Blood   Result Value Ref Range    D-Dimer,  Quantitative <270 0 - 470 ng/mL (FEU)   POC Glucose Fingerstick    Specimen: Blood   Result Value Ref Range    Glucose 275 (H) 70 - 130 mg/dL   POC Glucose Fingerstick    Specimen: Blood   Result Value Ref Range    Glucose 192 (H) 70 - 130 mg/dL   POC Glucose Fingerstick    Specimen: Blood   Result Value Ref Range    Glucose 153 (H) 70 - 130 mg/dL   BNP    Specimen: Blood   Result Value Ref Range    proBNP 284.0 0.0 - 900.0 pg/mL   CK    Specimen: Blood   Result Value Ref Range    Creatine Kinase 75 30 - 135 U/L   Comprehensive Metabolic Panel    Specimen: Blood   Result Value Ref Range    Glucose 181 (H) 60 - 100 mg/dL    BUN 23 (H) 7 - 21 mg/dL    Creatinine 1.02 (H) 0.50 - 1.00 mg/dL    Sodium 137 137 - 145 mmol/L    Potassium 3.9 3.5 - 5.1 mmol/L    Chloride 103 95 - 110 mmol/L    CO2 27.0 22.0 - 31.0 mmol/L    Calcium 9.1 8.4 - 10.2 mg/dL    Total Protein 6.2 (L) 6.3 - 8.6 g/dL    Albumin 3.60 3.40 - 4.80 g/dL    ALT (SGPT) 16 9 - 52 U/L    AST (SGOT) 15 14 - 36 U/L    Alkaline Phosphatase 48 38 - 126 U/L    Total Bilirubin 0.3 0.2 - 1.3 mg/dL    eGFR Non  Amer 55 45 - 104 mL/min/1.73    Globulin 2.6 2.3 - 3.5 gm/dL    A/G Ratio 1.4 1.1 - 1.8 g/dL    BUN/Creatinine Ratio 22.5 7.0 - 25.0    Anion Gap 7.0 5.0 - 15.0 mmol/L   Comprehensive Metabolic Panel    Specimen: Blood   Result Value Ref Range    Glucose 192 (H) 60 - 100 mg/dL    BUN 23 (H) 7 - 21 mg/dL    Creatinine 1.06 (H) 0.50 - 1.00 mg/dL    Sodium 140 137 - 145 mmol/L    Potassium 3.9 3.5 - 5.1 mmol/L    Chloride 103 95 - 110 mmol/L    CO2 28.0 22.0 - 31.0 mmol/L    Calcium 9.4 8.4 - 10.2 mg/dL    Total Protein 6.6 6.3 - 8.6 g/dL    Albumin 3.90 3.40 - 4.80 g/dL    ALT (SGPT) 26 9 - 52 U/L    AST (SGOT) 27 14 - 36 U/L    Alkaline Phosphatase 50 38 - 126 U/L    Total Bilirubin 0.3 0.2 - 1.3 mg/dL    eGFR Non  Amer 52 45 - 104 mL/min/1.73    Globulin 2.7 2.3 - 3.5 gm/dL    A/G Ratio 1.4 1.1 - 1.8 g/dL    BUN/Creatinine Ratio 21.7 7.0 - 25.0     Anion Gap 9.0 5.0 - 15.0 mmol/L     *Note: Due to a large number of results and/or encounters for the requested time period, some results have not been displayed. A complete set of results can be found in Results Review.       Some portions of this note have been dictated using voice recognition software and may contain errors and/or omissions.

## 2021-03-11 NOTE — PATIENT INSTRUCTIONS
MyPlate from USDA    MyPlate is an outline of a general healthy diet based on the 2010 Dietary Guidelines for Americans, from the [place] Department of Agriculture (USDA). It sets guidelines for how much food you should eat from each food group based on your age, sex, and level of physical activity.  What are tips for following MyPlate?  To follow MyPlate recommendations:  · Eat a wide variety of fruits and vegetables, grains, and protein foods.  · Serve smaller portions and eat less food throughout the day.  · Limit portion sizes to avoid overeating.  · Enjoy your food.  · Get at least 150 minutes of exercise every week. This is about 30 minutes each day, 5 or more days per week.  It can be difficult to have every meal look like MyPlate. Think about MyPlate as eating guidelines for an entire day, rather than each individual meal.  Fruits and vegetables  · Make half of your plate fruits and vegetables.  · Eat many different colors of fruits and vegetables each day.  · For a 2,000 calorie daily food plan, eat:  ? 2½ cups of vegetables every day.  ? 2 cups of fruit every day.  · 1 cup is equal to:  ? 1 cup raw or cooked vegetables.  ? 1 cup raw fruit.  ? 1 medium-sized orange, apple, or banana.  ? 1 cup 100% fruit or vegetable juice.  ? 2 cups raw leafy greens, such as lettuce, spinach, or kale.  ? ½ cup dried fruit.  Grains  · One fourth of your plate should be grains.  · Make at least half of the grains you eat each day whole grains.  · For a 2,000 calorie daily food plan, eat 6 oz of grains every day.  · 1 oz is equal to:  ? 1 slice bread.  ? 1 cup cereal.  ? ½ cup cooked rice, cereal, or pasta.  Protein  · One fourth of your plate should be protein.  · Eat a wide variety of protein foods, including meat, poultry, fish, eggs, beans, nuts, and tofu.  · For a 2,000 calorie daily food plan, eat 5½ oz of protein every day.  · 1 oz is equal to:  ? 1 oz meat, poultry, or fish.  ? ¼ cup cooked beans.  ? 1 egg.  ? ½ oz nuts  or seeds.  ? 1 Tbsp peanut butter.  Dairy  · Drink fat-free or low-fat (1%) milk.  · Eat or drink dairy as a side to meals.  · For a 2,000 calorie daily food plan, eat or drink 3 cups of dairy every day.  · 1 cup is equal to:  ? 1 cup milk, yogurt, cottage cheese, or soy milk (soy beverage).  ? 2 oz processed cheese.  ? 1½ oz natural cheese.  Fats, oils, salt, and sugars  · Only small amounts of oils are recommended.  · Avoid foods that are high in calories and low in nutritional value (empty calories), like foods high in fat or added sugars.  · Choose foods that are low in salt (sodium). Choose foods that have less than 140 milligrams (mg) of sodium per serving.  · Drink water instead of sugary drinks. Drink enough water each day to keep your urine pale yellow.  Where to find support  · Work with your health care provider or a nutrition specialist (dietitian) to develop a customized eating plan that is right for you.  · Download an guille (mobile application) to help you track your daily food intake.  Where to find more information  · Go to ChooseMyPlate.gov for more information.  Summary  · MyPlate is a general guideline for healthy eating from the USDA. It is based on the 2010 Dietary Guidelines for Americans.  · In general, fruits and vegetables should take up ½ of your plate, grains should take up ¼ of your plate, and protein should take up ¼ of your plate.  This information is not intended to replace advice given to you by your health care provider. Make sure you discuss any questions you have with your health care provider.  Document Revised: 05/21/2020 Document Reviewed: 03/19/2018  [employer] Patient Education © 2020 [employer]  BMI for Adults  What is BMI?  Body mass index (BMI) is a number that is calculated from a person's weight and height. BMI can help estimate how much of a person's weight is composed of fat. BMI does not measure body fat directly. Rather, it is an alternative to procedures that  "directly measure body fat, which can be difficult and expensive.  BMI can help identify people who may be at higher risk for certain medical problems.  What are BMI measurements used for?  BMI is used as a screening tool to identify possible weight problems. It helps determine whether a person is obese, overweight, a healthy weight, or underweight.  BMI is useful for:  · Identifying a weight problem that may be related to a medical condition or may increase the risk for medical problems.  · Promoting changes, such as changes in diet and exercise, to help reach a healthy weight. BMI screening can be repeated to see if these changes are working.  How is BMI calculated?  BMI involves measuring your weight in relation to your height. Both height and weight are measured, and the BMI is calculated from those numbers. This can be done either in English ([place] or metric measurements. Note that charts and online BMI calculators are available to help you find your BMI quickly and easily without having to do these calculations yourself.  To calculate your BMI in English ([place] measurements:    1. Measure your weight in pounds (lb).  2. Multiply the number of pounds by 703.  ? For example, for a person who weighs 180 lb, multiply that number by 703, which equals 126,540.  3. Measure your height in inches. Then multiply that number by itself to get a measurement called \"inches squared.\"  ? For example, for a person who is 70 inches tall, the \"inches squared\" measurement is 70 inches x 70 inches, which equals 4,900 inches squared.  4. Divide the total from step 2 (number of lb x 703) by the total from step 3 (inches squared): 126,540 ÷ 4,900 = 25.8. This is your BMI.  To calculate your BMI in metric measurements:  1. Measure your weight in kilograms (kg).  2. Measure your height in meters (m). Then multiply that number by itself to get a measurement called \"meters squared.\"  ? For example, for a person who is 1.75 m tall, the " "\"meters squared\" measurement is 1.75 m x 1.75 m, which is equal to 3.1 meters squared.  3. Divide the number of kilograms (your weight) by the meters squared number. In this example: 70 ÷ 3.1 = 22.6. This is your BMI.  What do the results mean?  BMI charts are used to identify whether you are underweight, normal weight, overweight, or obese. The following guidelines will be used:  · Underweight: BMI less than 18.5.  · Normal weight: BMI between 18.5 and 24.9.  · Overweight: BMI between 25 and 29.9.  · Obese: BMI of 30 or above.  Keep these notes in mind:  · Weight includes both fat and muscle, so someone with a muscular build, such as an athlete, may have a BMI that is higher than 24.9. In cases like these, BMI is not an accurate measure of body fat.  · To determine if excess body fat is the cause of a BMI of 25 or higher, further assessments may need to be done by a health care provider.  · BMI is usually interpreted in the same way for men and women.  Where to find more information  For more information about BMI, including tools to quickly calculate your BMI, go to these websites:  · Centers for Disease Control and Prevention: www.cdc.gov  · American Heart Association: www.heart.org  · National Heart, Lung, and Blood [place]: www.nhlbi.nih.gov  Summary  · Body mass index (BMI) is a number that is calculated from a person's weight and height.  · BMI may help estimate how much of a person's weight is composed of fat. BMI can help identify those who may be at higher risk for certain medical problems.  · BMI can be measured using English measurements or metric measurements.  · BMI charts are used to identify whether you are underweight, normal weight, overweight, or obese.  This information is not intended to replace advice given to you by your health care provider. Make sure you discuss any questions you have with your health care provider.  Document Revised: 09/09/2020 Document Reviewed: 07/17/2020  [employer] " Patient Education © 2020 [employer]

## 2021-03-11 NOTE — H&P (VIEW-ONLY)
Chief Complaint   Patient presents with   • Screening Colonoscopy     Ref. Sandra RICHARDSON PROCEDURE ORDERED: EGD/COLON gerd, hiccups, hx polyp, abd pain    Subjective    Mila Fisher is a 66 y.o. female. she is here today for follow-up.    History of Present Illness    Patient was sent for evaluation for colonoscopy by NANO Medina, who saw the patient on 02/24/2021. Laboratory at that time, A1c was 11.8%, cholesterol 204, triglycerides 182, . CBC showed anemia with hemoglobin 12.2, hematocrit 36.5, MCV 83.5. CMP showed glucose 299, BUN 22, creatinine 1.3, otherwise normal. I had previously seen the patient on 02/12/2018 and was treated for H. pylori but did not return for a followup. Last EGD/colonoscopy on 01/26/2018, showed gastritis and a polyp. She had a previous polyp in 2015.     Patient currently denies significant abdominal pain but she states she is having severe heartburn all the time. She feels like she is burning. She has frequent hiccups. She denied dysphagia. She has been having a lot of flatus. She states recently she had a change in her diabetic medications and she has had increasing problems with hemorrhoids. Weight is down 7 pounds since last visit.     ASSESSMENT/PLAN:  Patient does have moderately severe epigastric pain with a ventral diastasis; history of colon polyps, increasing GERD symptoms, variable bowel habits with anemia likely multifactorial. Given her persistent complaints recommend EGD/colonoscopy. We will do biopsies as indicated. We will see her followup after the above; further pending clinical course and the results of the above.     Thank you very much, Sandra, for this consultation and for allowing us to participate in the care of your patient. We will keep you informed.      The following portions of the patient's history were reviewed and updated as appropriate:   Past Medical History:   Diagnosis Date   • Acute bronchitis    • Anxiety      Anxiety state      • Arthropathy of lumbar facet joint    • Asthma    • Astigmatism    • At risk for adverse drug event     Adverse drug event resulting from treatment of disorder      • Back ache    • Benign essential hypertension    • CHF (congestive heart failure) (CMS/[facility])    • Cholecystitis     mild on ultrasound      • Chronic back pain    • Contact dermatitis    • COPD (chronic obstructive pulmonary disease) (CMS/[facility])    • Cough    • Depression     Depressive disorder, not elsewhere classified      • Diabetes mellitus (CMS/[facility])     no retinopathy      • Diabetes mellitus without complication (CMS/[facility])     type II or unspecified type, not stated as uncontrolled      • Diarrhea    • Drug therapy     Other long term (current) drug therapy      • Dyspnea    • Electrocardiogram abnormal    • Epigastric pain    • Episodic mood disorder (CMS/[facility])     Unspecified    • Esophageal reflux    • Esophagitis     grade II   • Foot pain     VS SMALL PHALANX AVULSION      • Generalized abdominal pain    • Generalized anxiety disorder    • History of colon polyps    • Hypermetropia    • Irritable bowel syndrome    • Knee pain 03/22/2016     being evaluated for knee replacements.       • Malaise and fatigue    • Myofascial pain    • Nausea and vomiting    • Neck pain    • Onychomycosis    • LJ (obstructive sleep apnea)    • Osteoarthritis    • Pain in wrist    • Polyp of intestine     large intestine   • Pure hypercholesterolemia    • Right upper quadrant pain    • Screening for hyperlipidemia    • Shoulder pain 09/02/2014    possible Rotator Cuff Tendinitis      • Spasm of back muscles    • Sprain of sacroiliac ligament    • Stroke (CMS/[facility])    • Transient cerebral ischemia    • Type 2 diabetes mellitus (CMS/[facility])    • Type II diabetes mellitus, uncontrolled (CMS/[facility])    • Upper respiratory infection    • Vitamin D deficiency      Past Surgical History:   Procedure Laterality Date   • CARPAL TUNNEL RELEASE     • CATARACT  EXTRACTION W/ INTRAOCULAR LENS IMPLANT Right 3/20/2018    Procedure: CATARACT PHACO EXTRACTION WITH INTRAOCULAR LENS IMPLANT;  Surgeon: Mateus Palencia MD;  Location: [facility] OR;  Service: Ophthalmology   • CATARACT EXTRACTION W/ INTRAOCULAR LENS IMPLANT Left 3/27/2018    Procedure: CATARACT PHACO EXTRACTION WITH INTRAOCULAR LENS IMPLANT;  Surgeon: Mateus Palencia MD;  Location: [facility] OR;  Service: Ophthalmology   • COLONOSCOPY  06/10/2015   • COLONOSCOPY N/A 2018    Procedure: COLONOSCOPY--bx;  Surgeon: Rg Cr MD;  Location: [facility] ENDOSCOPY;  Service:    • COLONOSCOPY W/ POLYPECTOMY  06/10/2015    Colonoscopy remove polyps 45253 (1)      • ENDOSCOPY  06/10/2015    EGD w/ biopsy 96604 (1)      • ENDOSCOPY N/A 2018    Procedure: ESOPHAGOGASTRODUODENOSCOPY--urgent, bx;  Surgeon: Rg Cr MD;  Location: [facility] ENDOSCOPY;  Service:    • INJECTION OF MEDICATION  2014    Drain/Inject Intermed Joint  (1)      • TUBAL ABDOMINAL LIGATION      Tubal ligation (1)      • UPPER GASTROINTESTINAL ENDOSCOPY  2018     Family History   Problem Relation Age of Onset   • Heart disease Other    • Bone cancer Other    • Lung cancer Other    • Throat cancer Other    • Cancer Other         Other   • Colon cancer Other         Colorectal Cancer   • Diabetes Other    • Hypertension Other      OB History    No obstetric history on file.       No Known Allergies  Social History     Socioeconomic History   • Marital status: Single     Spouse name: Not on file   • Number of children: Not on file   • Years of education: Not on file   • Highest education level: Not on file   Tobacco Use   • Smoking status: Former Smoker     Quit date:      Years since quittin.2   • Smokeless tobacco: Never Used   • Tobacco comment: non smoker for years   Vaping Use   • Vaping Use: Never used   Substance and Sexual Activity   • Alcohol use: No   • Drug use: No   • Sexual activity: Defer     Current  Medications:  Prior to Admission medications    Medication Sig Start Date End Date Taking? Authorizing Provider   amitriptyline (ELAVIL) 50 MG tablet Take 50 mg by mouth every night at bedtime. 2/13/18  Yes Irma Coker MD   Cholecalciferol (Vitamin D3) 1.25 MG (34202 UT) tablet Take 1 tablet by mouth 1 (One) Time Per Week.   Yes Irma Coker MD   clopidogrel (PLAVIX) 75 MG tablet Take 75 mg by mouth Daily. 3/8/21  Yes Irma Coker MD   furosemide (LASIX) 40 MG tablet Take 40 mg by mouth Daily.   Yes Irma Coker MD   glucose blood (FREESTYLE LITE) test strip 1 each by Other route As Needed. Use as instructed   Yes Irma Coker MD   glucose blood test strip Test tid dx code 250.00 on insulin 4/12/16  Yes Irma Coker MD   isosorbide mononitrate (IMDUR) 30 MG 24 hr tablet Take 30 mg by mouth Daily. 3/8/21  Yes Irma Coker MD   lisinopril (PRINIVIL,ZESTRIL) 20 MG tablet Take 20 mg by mouth Daily. 3/8/21  Yes Irma Coker MD   magnesium oxide (MAGOX) 400 (241.3 Mg) MG tablet tablet Take 400 mg by mouth 1 (One) Time. 12/28/20  Yes Irma Coker MD   pravastatin (PRAVACHOL) 40 MG tablet Take 40 mg by mouth Daily.   Yes Irma Coker MD   pravastatin (PRAVACHOL) 40 MG tablet TAKE ONE TABLET BY MOUTH EACH MORNING 10/12/20  Yes Irma Coker MD   rosuvastatin (CRESTOR) 10 MG tablet Take 10 mg by mouth Every Evening. 3/8/21  Yes Irma Coker MD   spironolactone (ALDACTONE) 25 MG tablet Take 25 mg by mouth Every Other Day. 3/8/21  Yes Irma Coker MD   albuterol (PROVENTIL HFA;VENTOLIN HFA) 108 (90 BASE) MCG/ACT inhaler Inhale 2 puffs Every 4 (Four) Hours As Needed for wheezing.    Irma Coker MD   albuterol (PROVENTIL) (2.5 MG/3ML) 0.083% nebulizer solution Take 2.5 mg by nebulization Every 4 (Four) Hours As Needed for wheezing.    Provider, Historical, MD   HYDROcodone-acetaminophen (NORCO) 7.5-325 MG  "per tablet Take 1 tablet by mouth Every 6 (Six) Hours As Needed. 4/11/17   Irma Coker MD   insulin aspart prot-insulin aspart (novoLOG 70/30) (70-30) 100 UNIT/ML injection Inject 20 Units under the skin 3 (Three) Times a Day With Meals.    Irma Coker MD   Insulin Syringe-Needle U-100 (GNP INSULIN SYRINGE) 31G X 5/16\" 0.3 ML misc     Irma Coker MD   Lancets (ACCU-CHEK MULTICLIX) lancets Use to test three times a day 6/16/15   Irma Coker MD   lisinopril (PRINIVIL,ZESTRIL) 5 MG tablet Take 5 mg by mouth Daily.    Irma Coker MD   metFORMIN (GLUCOPHAGE) 1000 MG tablet Take 1,000 mg by mouth 2 (Two) Times a Day With Meals.    Irma Coker MD   traZODone (DESYREL) 50 MG tablet Take 50 mg by mouth Every Night. 1/2/18   Irma Coker MD   TRUEPLUS INSULIN SYRINGE 31G X 5/16\" 0.5 ML misc  4/11/17   Irma Coker MD   vitamin D (ERGOCALCIFEROL) 40105 UNITS capsule capsule Take 50,000 Units by mouth 1 (One) Time Per Week.    Irma Coker MD     Review of Systems  Review of Systems   Constitutional: Negative for unexpected weight change.   HENT: Negative for trouble swallowing.    Gastrointestinal: Positive for abdominal pain, blood in stool, constipation, diarrhea, nausea and vomiting. Negative for abdominal distention, anal bleeding and rectal pain.          Objective    /67   Pulse 77   Ht 160 cm (63\")   Wt 126 kg (278 lb 6.4 oz)   BMI 49.32 kg/m²   Physical Exam  Vitals and nursing note reviewed.   Constitutional:       General: She is not in acute distress.     Appearance: She is well-developed.   HENT:      Head: Normocephalic and atraumatic.   Eyes:      Pupils: Pupils are equal, round, and reactive to light.   Cardiovascular:      Rate and Rhythm: Normal rate and regular rhythm.      Heart sounds: Normal heart sounds.   Pulmonary:      Effort: Pulmonary effort is normal.      Breath sounds: Normal breath sounds. "   Abdominal:      General: Bowel sounds are normal. There is distension. There is no abdominal bruit.      Palpations: Abdomen is soft. Abdomen is not rigid. There is no shifting dullness or mass.      Tenderness: There is abdominal tenderness. There is no guarding or rebound.      Hernia: No hernia is present. There is no hernia in the ventral area.      Comments: Mild diffuse   Musculoskeletal:         General: Normal range of motion.      Cervical back: Normal range of motion.   Skin:     General: Skin is warm and dry.   Neurological:      Mental Status: She is alert and oriented to person, place, and time.   Psychiatric:         Behavior: Behavior normal.         Thought Content: Thought content normal.         Judgment: Judgment normal.       Assessment/Plan      1. Other iron deficiency anemia    2. Gastroesophageal reflux disease, unspecified whether esophagitis present    3. Change in bowel habits    4. History of colon polyps    5. Family history of GI malignancy    .   Diagnoses and all orders for this visit:    1. Other iron deficiency anemia (Primary)  -     Case Request; Standing  -     dextrose 5 % and sodium chloride 0.45 % infusion  -     Case Request    2. Gastroesophageal reflux disease, unspecified whether esophagitis present  -     Case Request; Standing  -     dextrose 5 % and sodium chloride 0.45 % infusion  -     Case Request    3. Change in bowel habits  -     Case Request; Standing  -     dextrose 5 % and sodium chloride 0.45 % infusion  -     Case Request    4. History of colon polyps  -     Case Request; Standing  -     dextrose 5 % and sodium chloride 0.45 % infusion  -     Case Request    5. Family history of GI malignancy  -     Case Request; Standing  -     dextrose 5 % and sodium chloride 0.45 % infusion  -     Case Request    Other orders  -     Follow Anesthesia Guidelines / Standing Orders; Future  -     Obtain Informed Consent; Future  -     Obtain Informed Consent; Standing  -      POC Glucose Once; Standing        Orders placed during this encounter include:  Orders Placed This Encounter   Procedures   • Follow Anesthesia Guidelines / Standing Orders     Standing Status:   Future   • Obtain Informed Consent     Standing Status:   Future     Order Specific Question:   Informed Consent Given For     Answer:   ESOPHAGOGASTRODUODENOSCOPY and colonoscopy       Medications prescribed:  No orders of the defined types were placed in this encounter.      Requested Prescriptions      No prescriptions requested or ordered in this encounter       Review and/or summary of lab tests, radiology, procedures, medications. Review and summary of old records and obtaining of history. The risks and benefits of my recommendations, as well as other treatment options were discussed with the patient today. Questions were answered.    Follow-up: Return in about 1 month (around 4/11/2021), or if symptoms worsen or fail to improve.     ESOPHAGOGASTRODUODENOSCOPY (N/A), COLONOSCOPY (N/A)      This document has been electronically signed by Jarett Mcdonough PA-C on March 22, 2021 20:22 CDT      Results for orders placed or performed in visit on 03/21/21   COVID-19,APTIMA PANTHER,DMITRI IN-HOUSE, NP/OP SWAB IN UTM/VTM/SALINE TRANSPORT MEDIA,24 HR TAT - Swab, Nasopharynx    Specimen: Nasopharynx; Swab   Result Value Ref Range    COVID19 Not Detected Not Detected - Ref. Range   Results for orders placed or performed during the hospital encounter of 03/27/18   POC Glucose Once    Specimen: Blood   Result Value Ref Range    Glucose 268 (H) 70 - 130 mg/dL   Results for orders placed or performed during the hospital encounter of 03/20/18   POC Glucose Once    Specimen: Blood   Result Value Ref Range    Glucose 201 (H) 70 - 130 mg/dL   Results for orders placed or performed during the hospital encounter of 01/26/18   Tissue Pathology Exam - Tissue, Gastric, Antrum    Specimen: A: Gastric, Antrum; Tissue    B: Small Intestine,  Duodenum; Tissue    C: Large Intestine; Tissue    D: Large Intestine, Sigmoid Colon; Tissue   Result Value Ref Range    Case Report       Surgical Pathology Report                         Case: [id number]                                  Authorizing Provider:  Rg Cr MD         Collected:           01/26/2018 03:03 PM          Ordering Location:     [facility]             Received:            01/27/2018 09:59 AM                                 [place] [facility]                                                     Pathologist:           Elier Chris MD                                                          Specimens:   1) - Gastric, Antrum, antrum                                                                        2) - Small Intestine, Duodenum, small bowel                                                         3) - Large Intestine, colonic mucosa                                                                4) - Large Intestine, Sigmoid Colon, sigmoid poly cold snare                               Final Diagnosis       1.  MUCOSA, ANTRUM OF STOMACH:  ACTIVE CHRONIC GASTRITIS.  POSITIVE FOR HELICOBACTER PYLORI (HP IMMUNOSTAIN).    2.  MUCOSA, DUODENUM:  NO SIGNIFICANT HISTOLOGIC ABNORMALITY.    3.  MUCOSA, COLON:  NO SIGNIFICANT HISTOLOGIC ABNORMALITY.    4.  POLYP, SIGMOID COLON:  HYPERPLASTIC POLYP.      Comment       Helicobacter pylori (HP) immunostain is performed because an appropriate inflammatory milieu is present and organisms are not seen on H & E stained slides.    HP immunostain was developed and its performance characteristics determined by [facility].  It has not been cleared or approved by the U.S. Food and Drug Administration.  The FDA has determined that such clearance or approval is not necessary.  This test is used for clinical purposes.  It should not be regarded as investigational or for research.  This laboratory is certified  under the Clinical Laboratory Improvement Amendments of 1988 (CLIA-88) as qualified to perform high complexity clinical laboratory testing.          Gross Description       Received for examination are 4 containers, each of which have nodular bits of white soft tissue measuring 0.3-0.5 cc in aggregate.  All specimens are embedded as labeled.  1A antrum of stomach; 2A duodenum; 3A mucosa of colon; 4A polyp, sigmoid colon (cold snare).      Embedded Images     POC Glucose Once    Specimen: Blood   Result Value Ref Range    Glucose 179 (H) 70 - 130 mg/dL   Results for orders placed or performed in visit on 10/31/17   ToxASSURE Select 13 (MW) - Urine, Clean Catch    Specimen: Urine, Clean Catch   Result Value Ref Range    Report Summary FINAL    Results for orders placed or performed in visit on 09/11/17   Hepatitis panel, acute    Specimen: Blood   Result Value Ref Range    Hepatitis C Ab Negative Negative    Hep A IgM Negative Negative    Hep B C IgM Negative Negative    Hepatitis B Surface Ag Negative Negative   Results for orders placed or performed in visit on 03/07/17   ToxASSURE Select 13 (MW)    Specimen: Urine, Clean Catch   Result Value Ref Range    Report Summary FINAL     PDF Image .    Results for orders placed or performed during the hospital encounter of 02/08/17   Gold Top - SST   Result Value Ref Range    Extra Tube Hold for add-ons.    Green Top (Gel)   Result Value Ref Range    Extra Tube Hold for add-ons.    CK-MB    Specimen: Blood   Result Value Ref Range    CKMB 0.68 0.00 - 5.00 ng/mL   CBC Auto Differential    Specimen: Blood   Result Value Ref Range    WBC 4.83 3.20 - 9.80 10*3/mm3    RBC 4.41 3.77 - 5.16 10*6/mm3    Hemoglobin 12.1 12.0 - 15.5 g/dL    Hematocrit 36.7 35.0 - 45.0 %    MCV 83.2 80.0 - 98.0 fL    MCH 27.4 26.5 - 34.0 pg    MCHC 33.0 31.4 - 36.0 g/dL    RDW 13.3 11.5 - 14.5 %    RDW-SD 40.0 36.4 - 46.3 fl    MPV 10.8 8.0 - 12.0 fL    Platelets 126 (L) 150 - 450 10*3/mm3     Neutrophil % 49.3 37.0 - 80.0 %    Lymphocyte % 42.2 10.0 - 50.0 %    Monocyte % 5.8 0.0 - 12.0 %    Eosinophil % 2.1 0.0 - 7.0 %    Basophil % 0.4 0.0 - 2.0 %    Immature Grans % 0.2 0.0 - 0.5 %    Neutrophils, Absolute 2.38 2.00 - 8.60 10*3/mm3    Lymphocytes, Absolute 2.04 0.60 - 4.20 10*3/mm3    Monocytes, Absolute 0.28 0.00 - 0.90 10*3/mm3    Eosinophils, Absolute 0.10 0.00 - 0.70 10*3/mm3    Basophils, Absolute 0.02 0.00 - 0.20 10*3/mm3    Immature Grans, Absolute 0.01 0.00 - 0.02 10*3/mm3   CBC Auto Differential    Specimen: Blood   Result Value Ref Range    WBC 6.83 3.20 - 9.80 10*3/mm3    RBC 3.77 3.77 - 5.16 10*6/mm3    Hemoglobin 10.6 (L) 12.0 - 15.5 g/dL    Hematocrit 30.9 (L) 35.0 - 45.0 %    MCV 82.0 80.0 - 98.0 fL    MCH 28.1 26.5 - 34.0 pg    MCHC 34.3 31.4 - 36.0 g/dL    RDW 14.3 11.5 - 14.5 %    RDW-SD 42.8 36.4 - 46.3 fl    MPV 11.2 8.0 - 12.0 fL    Platelets 166 150 - 450 10*3/mm3    Neutrophil % 50.1 37.0 - 80.0 %    Lymphocyte % 40.7 10.0 - 50.0 %    Monocyte % 6.3 0.0 - 12.0 %    Eosinophil % 2.3 0.0 - 7.0 %    Basophil % 0.3 0.0 - 2.0 %    Immature Grans % 0.3 0.0 - 0.5 %    Neutrophils, Absolute 3.42 2.00 - 8.60 10*3/mm3    Lymphocytes, Absolute 2.78 0.60 - 4.20 10*3/mm3    Monocytes, Absolute 0.43 0.00 - 0.90 10*3/mm3    Eosinophils, Absolute 0.16 0.00 - 0.70 10*3/mm3    Basophils, Absolute 0.02 0.00 - 0.20 10*3/mm3    Immature Grans, Absolute 0.02 0.00 - 0.02 10*3/mm3   Lavender Top   Result Value Ref Range    Extra Tube hold for add-on    Light Blue Top   Result Value Ref Range    Extra Tube hold for add-on    Troponin    Specimen: Blood   Result Value Ref Range    Troponin I <0.012 <=0.034 ng/mL   Troponin    Specimen: Blood   Result Value Ref Range    Troponin I <0.012 <=0.034 ng/mL   D-dimer, Quantitative    Specimen: Blood   Result Value Ref Range    D-Dimer, Quantitative <270 0 - 470 ng/mL (FEU)   D-dimer, Quantitative    Specimen: Blood   Result Value Ref Range    D-Dimer,  Quantitative <270 0 - 470 ng/mL (FEU)   POC Glucose Fingerstick    Specimen: Blood   Result Value Ref Range    Glucose 275 (H) 70 - 130 mg/dL   POC Glucose Fingerstick    Specimen: Blood   Result Value Ref Range    Glucose 192 (H) 70 - 130 mg/dL   POC Glucose Fingerstick    Specimen: Blood   Result Value Ref Range    Glucose 153 (H) 70 - 130 mg/dL   BNP    Specimen: Blood   Result Value Ref Range    proBNP 284.0 0.0 - 900.0 pg/mL   CK    Specimen: Blood   Result Value Ref Range    Creatine Kinase 75 30 - 135 U/L   Comprehensive Metabolic Panel    Specimen: Blood   Result Value Ref Range    Glucose 181 (H) 60 - 100 mg/dL    BUN 23 (H) 7 - 21 mg/dL    Creatinine 1.02 (H) 0.50 - 1.00 mg/dL    Sodium 137 137 - 145 mmol/L    Potassium 3.9 3.5 - 5.1 mmol/L    Chloride 103 95 - 110 mmol/L    CO2 27.0 22.0 - 31.0 mmol/L    Calcium 9.1 8.4 - 10.2 mg/dL    Total Protein 6.2 (L) 6.3 - 8.6 g/dL    Albumin 3.60 3.40 - 4.80 g/dL    ALT (SGPT) 16 9 - 52 U/L    AST (SGOT) 15 14 - 36 U/L    Alkaline Phosphatase 48 38 - 126 U/L    Total Bilirubin 0.3 0.2 - 1.3 mg/dL    eGFR Non  Amer 55 45 - 104 mL/min/1.73    Globulin 2.6 2.3 - 3.5 gm/dL    A/G Ratio 1.4 1.1 - 1.8 g/dL    BUN/Creatinine Ratio 22.5 7.0 - 25.0    Anion Gap 7.0 5.0 - 15.0 mmol/L   Comprehensive Metabolic Panel    Specimen: Blood   Result Value Ref Range    Glucose 192 (H) 60 - 100 mg/dL    BUN 23 (H) 7 - 21 mg/dL    Creatinine 1.06 (H) 0.50 - 1.00 mg/dL    Sodium 140 137 - 145 mmol/L    Potassium 3.9 3.5 - 5.1 mmol/L    Chloride 103 95 - 110 mmol/L    CO2 28.0 22.0 - 31.0 mmol/L    Calcium 9.4 8.4 - 10.2 mg/dL    Total Protein 6.6 6.3 - 8.6 g/dL    Albumin 3.90 3.40 - 4.80 g/dL    ALT (SGPT) 26 9 - 52 U/L    AST (SGOT) 27 14 - 36 U/L    Alkaline Phosphatase 50 38 - 126 U/L    Total Bilirubin 0.3 0.2 - 1.3 mg/dL    eGFR Non  Amer 52 45 - 104 mL/min/1.73    Globulin 2.7 2.3 - 3.5 gm/dL    A/G Ratio 1.4 1.1 - 1.8 g/dL    BUN/Creatinine Ratio 21.7 7.0 - 25.0     Anion Gap 9.0 5.0 - 15.0 mmol/L     *Note: Due to a large number of results and/or encounters for the requested time period, some results have not been displayed. A complete set of results can be found in Results Review.       Some portions of this note have been dictated using voice recognition software and may contain errors and/or omissions.

## 2021-03-21 ENCOUNTER — LAB (OUTPATIENT)
Dept: LAB | Facility: HOSPITAL | Age: 66
End: 2021-03-21

## 2021-03-21 DIAGNOSIS — Z01.818 PREOP TESTING: Primary | ICD-10-CM

## 2021-03-21 PROCEDURE — C9803 HOPD COVID-19 SPEC COLLECT: HCPCS

## 2021-03-21 PROCEDURE — U0004 COV-19 TEST NON-CDC HGH THRU: HCPCS

## 2021-03-21 PROCEDURE — U0005 INFEC AGEN DETEC AMPLI PROBE: HCPCS

## 2021-03-22 LAB — SARS-COV-2 ORF1AB RESP QL NAA+PROBE: NOT DETECTED

## 2021-03-24 ENCOUNTER — HOSPITAL ENCOUNTER (OUTPATIENT)
Facility: HOSPITAL | Age: 66
Setting detail: HOSPITAL OUTPATIENT SURGERY
Discharge: HOME | End: 2021-03-24
Attending: INTERNAL MEDICINE | Admitting: INTERNAL MEDICINE

## 2021-03-24 ENCOUNTER — ANESTHESIA (OUTPATIENT)
Dept: GASTROENTEROLOGY | Facility: HOSPITAL | Age: 66
End: 2021-03-24

## 2021-03-24 ENCOUNTER — ANESTHESIA EVENT (OUTPATIENT)
Dept: GASTROENTEROLOGY | Facility: HOSPITAL | Age: 66
End: 2021-03-24

## 2021-03-24 VITALS
WEIGHT: 277.4 LBS | RESPIRATION RATE: 18 BRPM | BODY MASS INDEX: 49.15 KG/M2 | HEIGHT: 63 IN | TEMPERATURE: 96.6 F | HEART RATE: 67 BPM | OXYGEN SATURATION: 95 % | DIASTOLIC BLOOD PRESSURE: 60 MMHG | SYSTOLIC BLOOD PRESSURE: 120 MMHG

## 2021-03-24 DIAGNOSIS — K21.9 GASTROESOPHAGEAL REFLUX DISEASE, UNSPECIFIED WHETHER ESOPHAGITIS PRESENT: ICD-10-CM

## 2021-03-24 DIAGNOSIS — R19.4 CHANGE IN BOWEL HABITS: ICD-10-CM

## 2021-03-24 DIAGNOSIS — D50.8 OTHER IRON DEFICIENCY ANEMIA: ICD-10-CM

## 2021-03-24 DIAGNOSIS — Z80.0 FAMILY HISTORY OF GI MALIGNANCY: ICD-10-CM

## 2021-03-24 DIAGNOSIS — Z86.010 HISTORY OF COLON POLYPS: ICD-10-CM

## 2021-03-24 LAB — GLUCOSE BLDC GLUCOMTR-MCNC: 195 MG/DL (ref 70–130)

## 2021-03-24 PROCEDURE — 45380 COLONOSCOPY AND BIOPSY: CPT | Performed by: INTERNAL MEDICINE

## 2021-03-24 PROCEDURE — 88342 IMHCHEM/IMCYTCHM 1ST ANTB: CPT

## 2021-03-24 PROCEDURE — 88305 TISSUE EXAM BY PATHOLOGIST: CPT

## 2021-03-24 PROCEDURE — 43239 EGD BIOPSY SINGLE/MULTIPLE: CPT | Performed by: INTERNAL MEDICINE

## 2021-03-24 PROCEDURE — 82962 GLUCOSE BLOOD TEST: CPT

## 2021-03-24 PROCEDURE — 25010000002 PROPOFOL 10 MG/ML EMULSION: Performed by: NURSE ANESTHETIST, CERTIFIED REGISTERED

## 2021-03-24 RX ORDER — LIDOCAINE HYDROCHLORIDE 20 MG/ML
INJECTION, SOLUTION INTRAVENOUS AS NEEDED
Status: DISCONTINUED | OUTPATIENT
Start: 2021-03-24 | End: 2021-03-24 | Stop reason: SURG

## 2021-03-24 RX ORDER — PROPOFOL 10 MG/ML
VIAL (ML) INTRAVENOUS AS NEEDED
Status: DISCONTINUED | OUTPATIENT
Start: 2021-03-24 | End: 2021-03-24 | Stop reason: SURG

## 2021-03-24 RX ORDER — DEXTROSE AND SODIUM CHLORIDE 5; .45 G/100ML; G/100ML
30 INJECTION, SOLUTION INTRAVENOUS CONTINUOUS PRN
Status: DISCONTINUED | OUTPATIENT
Start: 2021-03-24 | End: 2021-03-24 | Stop reason: HOSPADM

## 2021-03-24 RX ADMIN — PROPOFOL 40 MG: 10 INJECTION, EMULSION INTRAVENOUS at 10:24

## 2021-03-24 RX ADMIN — PROPOFOL 40 MG: 10 INJECTION, EMULSION INTRAVENOUS at 10:32

## 2021-03-24 RX ADMIN — PROPOFOL 40 MG: 10 INJECTION, EMULSION INTRAVENOUS at 10:29

## 2021-03-24 RX ADMIN — DEXTROSE AND SODIUM CHLORIDE 30 ML/HR: 5; 450 INJECTION, SOLUTION INTRAVENOUS at 09:15

## 2021-03-24 RX ADMIN — PROPOFOL 120 MG: 10 INJECTION, EMULSION INTRAVENOUS at 10:21

## 2021-03-24 RX ADMIN — PROPOFOL 40 MG: 10 INJECTION, EMULSION INTRAVENOUS at 10:27

## 2021-03-24 RX ADMIN — LIDOCAINE HYDROCHLORIDE 100 MG: 20 INJECTION, SOLUTION INTRAVENOUS at 10:21

## 2021-03-24 NOTE — ANESTHESIA PREPROCEDURE EVALUATION
Anesthesia Evaluation     Patient summary reviewed and Nursing notes reviewed   NPO Solid Status: > 8 hours  NPO Liquid Status: > 2 hours           Airway   Mallampati: III  TM distance: >3 FB  Neck ROM: full  No difficulty expected  Dental    (+) edentulous    Pulmonary - normal exam   (+) COPD moderate, asthma,shortness of breath, recent URI resolved, sleep apnea on CPAP,   Cardiovascular - normal exam    PT is on anticoagulation therapy  Patient on routine beta blocker    (+) hypertension well controlled 2 medications or greater, CHF Systolic <55%, hyperlipidemia,       Neuro/Psych  (+) CVA, psychiatric history Anxiety and Depression,     GI/Hepatic/Renal/Endo    (+)  GERD poorly controlled,  diabetes mellitus type 2 poorly controlled using insulin,     Musculoskeletal     (+) neck pain,   Abdominal  - normal exam   Substance History - negative use     OB/GYN negative ob/gyn ROS         Other   arthritis,                      Anesthesia Plan    ASA 4     MAC     intravenous induction     Anesthetic plan, all risks, benefits, and alternatives have been provided, discussed and informed consent has been obtained with: patient.

## 2021-03-24 NOTE — ANESTHESIA POSTPROCEDURE EVALUATION
Patient: Mila Fisher    Procedure Summary     Date: 03/24/21 Room / Location: [facility] ENDOSCOPY 3 / [facility] ENDOSCOPY    Anesthesia Start: 1014 Anesthesia Stop: 1039    Procedures:       ESOPHAGOGASTRODUODENOSCOPY (N/A )      COLONOSCOPY (N/A ) Diagnosis:       Other iron deficiency anemia      Gastroesophageal reflux disease, unspecified whether esophagitis present      Change in bowel habits      History of colon polyps      Family history of GI malignancy      (Other iron deficiency anemia [D50.8])      (Gastroesophageal reflux disease, unspecified whether esophagitis present [K21.9])      (Change in bowel habits [R19.4])      (History of colon polyps [Z86.010])      (Family history of GI malignancy [Z80.0])    Surgeons: Rg Cr MD Provider: Trina Dover CRNA    Anesthesia Type: MAC ASA Status: 4          Anesthesia Type: MAC    Vitals  No vitals data found for the desired time range.          Post Anesthesia Care and Evaluation    Patient location during evaluation: bedside  Patient participation: waiting for patient participation  Level of consciousness: sleepy but conscious  Pain score: 0  Pain management: adequate  Airway patency: patent  Anesthetic complications: No anesthetic complications  PONV Status: none  Cardiovascular status: acceptable  Respiratory status: acceptable  Hydration status: acceptable

## 2021-03-26 LAB
LAB AP CASE REPORT: NORMAL
PATH REPORT.FINAL DX SPEC: NORMAL

## 2021-03-31 ENCOUNTER — OFFICE VISIT (OUTPATIENT)
Dept: GASTROENTEROLOGY | Facility: CLINIC | Age: 66
End: 2021-03-31

## 2021-03-31 VITALS
WEIGHT: 279.6 LBS | SYSTOLIC BLOOD PRESSURE: 112 MMHG | BODY MASS INDEX: 49.54 KG/M2 | HEIGHT: 63 IN | DIASTOLIC BLOOD PRESSURE: 64 MMHG | HEART RATE: 74 BPM

## 2021-03-31 DIAGNOSIS — R10.10 PAIN OF UPPER ABDOMEN: ICD-10-CM

## 2021-03-31 DIAGNOSIS — A04.8 HELICOBACTER PYLORI INFECTION: Primary | ICD-10-CM

## 2021-03-31 DIAGNOSIS — K63.5 POLYP OF SIGMOID COLON, UNSPECIFIED TYPE: ICD-10-CM

## 2021-03-31 PROCEDURE — 99214 OFFICE O/P EST MOD 30 MIN: CPT | Performed by: PHYSICIAN ASSISTANT

## 2021-03-31 RX ORDER — METRONIDAZOLE 500 MG/1
500 TABLET ORAL 2 TIMES DAILY
Qty: 28 TABLET | Refills: 0 | Status: SHIPPED | OUTPATIENT
Start: 2021-03-31

## 2021-03-31 RX ORDER — HYDROCODONE BITARTRATE AND ACETAMINOPHEN 7.5; 325 MG/1; MG/1
TABLET ORAL
COMMUNITY
Start: 2021-03-16

## 2021-03-31 RX ORDER — OMEPRAZOLE 20 MG/1
20 CAPSULE, DELAYED RELEASE ORAL DAILY
Qty: 30 CAPSULE | Refills: 0 | Status: SHIPPED | OUTPATIENT
Start: 2021-03-31 | End: 2022-06-27 | Stop reason: ALTCHOICE

## 2021-03-31 RX ORDER — CLARITHROMYCIN 500 MG/1
500 TABLET, COATED ORAL 2 TIMES DAILY
Qty: 28 TABLET | Refills: 0 | Status: SHIPPED | OUTPATIENT
Start: 2021-03-31 | End: 2022-06-27 | Stop reason: ALTCHOICE

## 2021-06-21 ENCOUNTER — OFFICE VISIT (OUTPATIENT)
Dept: SLEEP MEDICINE | Facility: HOSPITAL | Age: 66
End: 2021-06-21

## 2021-06-21 VITALS
WEIGHT: 278 LBS | OXYGEN SATURATION: 97 % | HEART RATE: 89 BPM | DIASTOLIC BLOOD PRESSURE: 66 MMHG | HEIGHT: 63 IN | SYSTOLIC BLOOD PRESSURE: 132 MMHG | BODY MASS INDEX: 49.26 KG/M2

## 2021-06-21 DIAGNOSIS — E66.01 MORBID OBESITY (HCC): ICD-10-CM

## 2021-06-21 DIAGNOSIS — G47.33 OBSTRUCTIVE SLEEP APNEA, ADULT: Primary | ICD-10-CM

## 2021-06-21 PROCEDURE — 99213 OFFICE O/P EST LOW 20 MIN: CPT | Performed by: NURSE PRACTITIONER

## 2021-06-21 NOTE — PROGRESS NOTES
Sleep Clinic Follow Up    Date: 2021  Primary Care Provider: Sandra Alonzo APRN    Last office visit: 2020 (telephone visit) (I reviewed this note)    CC: Follow up: LJ on CPAP      Interim History:  Since the last visit:    1) LJ -  Mila Fisher has remained compliant with CPAP. She denies mask and machine issues, dry mouth, headaches, or pressures intolerance. She denies abnormal dreams, sleep paralysis, nasal congestion, URI sx.    2) Patient denies RLS symptoms.     Sleep Testin. PSG ~, AHI of ?  2. Currently on 12-20 cm H2O    PAP Data:    Time frame: 2020-06/15/2021   Compliance: 98.9%  Average use on days used: 7 hrs 43 min  Percent of days with usage greater than or equal to 4 hours: 95.6%  PAP range: 12-20 cm H2O  Average 90% pressure: 13 cmH2O  Leak: 3 hours 38 minutes  Average AHI: 1.6 events/hr  Mask type: Full face mask  DME: [facility]    Bed time: no set time  Sleep latency: <5 minutes  Number of times awakens during the night: 1-2  Wake time: no set time, but usually ~0632-6368  Estimated total sleep time at night: 4-12 hours  Caffeine intake: 1 cups of coffee, 0 cups of tea, and 0-1 sodas per day  Alcohol intake: 0 drinks per week  Nap time: occasional, more lately because of nervousness   Sleepiness with Driving: none     [place] - 4    PMHx, FH, SH reviewed and pertinent changes are: Had cataract surgery.      REVIEW OF SYSTEMS:   Negative for chest pain, SOA, fever, chills, cough, N/V/D, abdominal pain.    Smoking:none    Mila Fisher  reports that she quit smoking about 25 years ago. She has never used smokeless tobacco.      Exam:  Vitals:    21 0935   BP: 132/66   Pulse: 89   SpO2: 97%           21  0935   Weight: 126 kg (278 lb)     Body mass index is 49.25 kg/m². Patient's Body mass index is 49.25 kg/m². indicating that she is morbidly obese (BMI > 40 or > 35 with obesity - related health condition). Obesity-related health  conditions include the following: obstructive sleep apnea, hypertension, diabetes mellitus, dyslipidemias and GERD. Obesity is unchanged. BMI is is above average; BMI management plan is completed. I recommend portion control and increasing exercise.      Gen:                No distress, conversant, pleasant, appears stated age, alert, oriented  Eyes:               Anicteric sclera, moist conjunctiva, no lid lag                           PERRL, EOMI   Heent:             NC/AT                          Oropharynx clear                          Normal hearing  Lungs:             Normal effort, non-labored breathing                          Clear to auscultation bilaterally          CV:                  Normal S1/S2, no murmur                          No lower extremity edema  ABD:               Soft, rounded, non-distended                          Normal bowel sounds                    Psych:             Appropriate affect  Neuro:             CN 2-12 appear intact    Past Medical History:   Diagnosis Date   • Acute bronchitis    • Anxiety     Anxiety state      • Arthropathy of lumbar facet joint    • Asthma    • Astigmatism    • At risk for adverse drug event     Adverse drug event resulting from treatment of disorder      • Back ache    • Benign essential hypertension    • CHF (congestive heart failure) (CMS/[facility])    • Cholecystitis     mild on ultrasound      • Chronic back pain    • Contact dermatitis    • COPD (chronic obstructive pulmonary disease) (CMS/[facility])    • Cough    • Depression     Depressive disorder, not elsewhere classified      • Diabetes mellitus (CMS/HCC)     no retinopathy      • Diabetes mellitus without complication (CMS/HCC)     type II or unspecified type, not stated as uncontrolled      • Diarrhea    • Drug therapy     Other long term (current) drug therapy      • Dyspnea    • Electrocardiogram abnormal    • Epigastric pain    • Episodic mood disorder (CMS/HCC)     Unspecified    • Esophageal  reflux    • Esophagitis     grade II   • Foot pain     VS SMALL PHALANX AVULSION      • Generalized abdominal pain    • Generalized anxiety disorder    • History of colon polyps    • Hypermetropia    • Irritable bowel syndrome    • Knee pain 03/22/2016     being evaluated for knee replacements.       • Malaise and fatigue    • Myofascial pain    • Nausea and vomiting    • Neck pain    • Onychomycosis    • LJ (obstructive sleep apnea)    • Osteoarthritis    • Pain in wrist    • Polyp of intestine     large intestine   • Pure hypercholesterolemia    • Right upper quadrant pain    • Screening for hyperlipidemia    • Shoulder pain 09/02/2014    possible Rotator Cuff Tendinitis      • Spasm of back muscles    • Sprain of sacroiliac ligament    • Stroke (CMS/HCC)    • Transient cerebral ischemia    • Type 2 diabetes mellitus (CMS/HCC)    • Type II diabetes mellitus, uncontrolled (CMS/HCC)    • Upper respiratory infection    • Vitamin D deficiency        Current Outpatient Medications:   •  amitriptyline (ELAVIL) 50 MG tablet, Take 50 mg by mouth every night at bedtime., Disp: , Rfl:   •  Cholecalciferol (Vitamin D3) 1.25 MG (93859 UT) tablet, Take 1 tablet by mouth 1 (One) Time Per Week., Disp: , Rfl:   •  clarithromycin (BIAXIN) 500 MG tablet, Take 1 tablet by mouth 2 (Two) Times a Day., Disp: 28 tablet, Rfl: 0  •  clopidogrel (PLAVIX) 75 MG tablet, Take 75 mg by mouth Daily., Disp: , Rfl:   •  furosemide (LASIX) 40 MG tablet, Take 40 mg by mouth Daily., Disp: , Rfl:   •  glucose blood (FREESTYLE LITE) test strip, 1 each by Other route As Needed. Use as instructed, Disp: , Rfl:   •  glucose blood test strip, Test tid dx code 250.00 on insulin, Disp: , Rfl:   •  HYDROcodone-acetaminophen (NORCO) 7.5-325 MG per tablet, TAKE 1 TABLET FOUR TIMES DAILY AS NEEDED FOR PAIN (EFFECTIVE 3/16/21), Disp: , Rfl:   •  Insulin Glargine (Lantus SoloStar) 100 UNIT/ML injection pen, Inject 80 Units under the skin into the appropriate  "area as directed Every Night., Disp: , Rfl:   •  Insulin Syringe-Needle U-100 (GNP INSULIN SYRINGE) 31G X 5/16\" 0.3 ML misc, , Disp: , Rfl:   •  isosorbide mononitrate (IMDUR) 30 MG 24 hr tablet, Take 30 mg by mouth Daily., Disp: , Rfl:   •  Lancets (ACCU-CHEK MULTICLIX) lancets, Use to test three times a day, Disp: , Rfl:   •  lisinopril (PRINIVIL,ZESTRIL) 20 MG tablet, Take 20 mg by mouth Daily., Disp: , Rfl:   •  magnesium oxide (MAGOX) 400 (241.3 Mg) MG tablet tablet, Take 400 mg by mouth 1 (One) Time., Disp: , Rfl:   •  metroNIDAZOLE (FLAGYL) 500 MG tablet, Take 1 tablet by mouth 2 (Two) Times a Day., Disp: 28 tablet, Rfl: 0  •  omeprazole (PrilOSEC) 20 MG capsule, Take 1 capsule by mouth Daily., Disp: 30 capsule, Rfl: 0  •  pravastatin (PRAVACHOL) 40 MG tablet, Take 40 mg by mouth Daily., Disp: , Rfl:   •  rosuvastatin (CRESTOR) 10 MG tablet, Take 10 mg by mouth Every Evening., Disp: , Rfl:   •  spironolactone (ALDACTONE) 25 MG tablet, Take 25 mg by mouth Every Other Day., Disp: , Rfl:   •  vitamin D (ERGOCALCIFEROL) 28565 UNITS capsule capsule, Take 50,000 Units by mouth 1 (One) Time Per Week., Disp: , Rfl:     WBC   Date Value Ref Range Status   02/09/2017 4.83 3.20 - 9.80 10*3/mm3 Final     RBC   Date Value Ref Range Status   02/09/2017 4.41 3.77 - 5.16 10*6/mm3 Final     Hemoglobin   Date Value Ref Range Status   02/09/2017 12.1 12.0 - 15.5 g/dL Final     Hematocrit   Date Value Ref Range Status   02/09/2017 36.7 35.0 - 45.0 % Final     MCV   Date Value Ref Range Status   02/09/2017 83.2 80.0 - 98.0 fL Final     MCH   Date Value Ref Range Status   02/09/2017 27.4 26.5 - 34.0 pg Final     MCHC   Date Value Ref Range Status   02/09/2017 33.0 31.4 - 36.0 g/dL Final     RDW   Date Value Ref Range Status   02/09/2017 13.3 11.5 - 14.5 % Final     RDW-SD   Date Value Ref Range Status   02/09/2017 40.0 36.4 - 46.3 fl Final     MPV   Date Value Ref Range Status   02/09/2017 10.8 8.0 - 12.0 fL Final     Platelets   "   Date Value Ref Range Status   02/09/2017 126 (L) 150 - 450 10*3/mm3 Final     Neutrophil %   Date Value Ref Range Status   02/09/2017 49.3 37.0 - 80.0 % Final     Lymphocyte %   Date Value Ref Range Status   02/09/2017 42.2 10.0 - 50.0 % Final     Monocyte %   Date Value Ref Range Status   02/09/2017 5.8 0.0 - 12.0 % Final     Eosinophil %   Date Value Ref Range Status   02/09/2017 2.1 0.0 - 7.0 % Final     Basophil %   Date Value Ref Range Status   02/09/2017 0.4 0.0 - 2.0 % Final     Immature Grans %   Date Value Ref Range Status   02/09/2017 0.2 0.0 - 0.5 % Final     Neutrophils, Absolute   Date Value Ref Range Status   02/09/2017 2.38 2.00 - 8.60 10*3/mm3 Final     Lymphocytes, Absolute   Date Value Ref Range Status   02/09/2017 2.04 0.60 - 4.20 10*3/mm3 Final     Monocytes, Absolute   Date Value Ref Range Status   02/09/2017 0.28 0.00 - 0.90 10*3/mm3 Final     Eosinophils, Absolute   Date Value Ref Range Status   02/09/2017 0.10 0.00 - 0.70 10*3/mm3 Final     Basophils, Absolute   Date Value Ref Range Status   02/09/2017 0.02 0.00 - 0.20 10*3/mm3 Final     Immature Grans, Absolute   Date Value Ref Range Status   02/09/2017 0.01 0.00 - 0.02 10*3/mm3 Final       Lab Results   Component Value Date    GLUCOSE 181 (H) 02/09/2017    BUN 19 (H) 02/13/2018    CREATININE 1.1 02/13/2018    EGFRIFNONA 55 02/09/2017    BCR 22.5 02/09/2017    K 4.7 02/13/2018    CO2 27.0 02/09/2017    CALCIUM 8.9 02/13/2018    ALBUMIN 3.6 02/13/2018    AST 19 02/13/2018    ALT 31 02/13/2018       Assessment and Plan:    1. Obstructive sleep apnea - Established, stable (1)  1. Compliant with PAP therapy  2. Script for PAP supplies  3. Advised patient of new safety recall of [employer] CPAP/BiPAP/AVAPS machines. We discussed the risk versus benefit of continuing usage of PAP therapy. The company has recommended that patients stop usage of their current machines. Instructed patient to call [employer] ([phone]) to check  if the machine is under warranty for noise-cancelling foam replacement. Follow up with DME company regarding any further questions, for consideration for new machine once eligible. Advised to call us if any further questions or problems  4. Return to clinic in 1 year with compliance report unless change in symptoms in interim period  2. Morbid obesity - BMI 49.2 - stable chronic illness      I spent 20 minutes caring for Mila on this date of service. This time includes time spent by me in the following activities: preparing for the visit, reviewing tests, obtaining and/or reviewing a separately obtained history, performing a medically appropriate examination and/or evaluation , counseling and educating the patient/family/caregiver and documenting information in the medical record; discussing PAP therapy, PAP compliance and PAP maintenance    RTC in 12 months. Patient agrees to return sooner if changes in symptoms.        This document has been electronically signed by NANO Davis on June 21, 2021 09:38 CDT          CC: Sandra Alonzo APRN          No ref. provider found

## 2021-06-21 NOTE — PATIENT INSTRUCTIONS
-Call [employer] to register machine's serial number: [id number]  -Website: [email or web address]

## 2022-01-31 ENCOUNTER — TRANSCRIBE ORDERS (OUTPATIENT)
Dept: ORTHOPEDIC SURGERY | Facility: CLINIC | Age: 67
End: 2022-01-31

## 2022-01-31 DIAGNOSIS — M25.512 ACUTE PAIN OF LEFT SHOULDER: Primary | ICD-10-CM

## 2022-02-11 DIAGNOSIS — M25.512 ACUTE PAIN OF LEFT SHOULDER: Primary | ICD-10-CM

## 2022-06-27 ENCOUNTER — OFFICE VISIT (OUTPATIENT)
Dept: SLEEP MEDICINE | Facility: HOSPITAL | Age: 67
End: 2022-06-27

## 2022-06-27 VITALS
OXYGEN SATURATION: 96 % | HEIGHT: 63 IN | SYSTOLIC BLOOD PRESSURE: 101 MMHG | BODY MASS INDEX: 49.26 KG/M2 | DIASTOLIC BLOOD PRESSURE: 54 MMHG | HEART RATE: 73 BPM | WEIGHT: 278 LBS

## 2022-06-27 DIAGNOSIS — G47.33 OBSTRUCTIVE SLEEP APNEA, ADULT: Primary | ICD-10-CM

## 2022-06-27 DIAGNOSIS — E66.01 MORBID OBESITY: ICD-10-CM

## 2022-06-27 PROCEDURE — 99213 OFFICE O/P EST LOW 20 MIN: CPT | Performed by: NURSE PRACTITIONER

## 2022-06-27 RX ORDER — SEMAGLUTIDE 1.34 MG/ML
INJECTION, SOLUTION SUBCUTANEOUS
COMMUNITY
Start: 2022-05-18

## 2022-06-27 RX ORDER — TRIAMCINOLONE ACETONIDE 5 MG/G
OINTMENT TOPICAL
COMMUNITY
Start: 2022-05-26 | End: 2023-05-27

## 2022-06-27 RX ORDER — SODIUM CHLORIDE FOR INHALATION 3 %
4 VIAL, NEBULIZER (ML) INHALATION
COMMUNITY
Start: 2022-01-27 | End: 2023-01-28

## 2022-06-27 RX ORDER — ALBUTEROL SULFATE 90 UG/1
AEROSOL, METERED RESPIRATORY (INHALATION)
COMMUNITY
Start: 2022-06-27

## 2022-06-27 RX ORDER — INSULIN LISPRO 100 [IU]/ML
INJECTION, SOLUTION INTRAVENOUS; SUBCUTANEOUS
COMMUNITY
Start: 2022-04-11

## 2022-06-27 RX ORDER — LISINOPRIL 10 MG/1
10 TABLET ORAL DAILY
COMMUNITY
Start: 2022-05-20

## 2022-06-27 RX ORDER — PANTOPRAZOLE SODIUM 40 MG/1
1 TABLET, DELAYED RELEASE ORAL
COMMUNITY
Start: 2022-05-18 | End: 2022-06-27 | Stop reason: ALTCHOICE

## 2022-06-27 NOTE — PROGRESS NOTES
Sleep Clinic Follow Up    Date: 2022  Primary Care Provider: Sandra Alonzo APRN    Last office visit: 2021 (I reviewed this note)    CC: Follow up: LJ on CPAP      Interim History:  Since the last visit:    1) LJ -  Mila Fisher has remained compliant with CPAP. She denies mask and machine issues, dry mouth, headaches, or pressures intolerance. She denies abnormal dreams, sleep paralysis, nasal congestion, URI sx. She has received her replacement machine.    2) Patient denies RLS symptoms.     Sleep Testin. PSG ~, AHI of ?   2. Currently on 12-20 cm H2O    PAP Data:    Time frame: 02/10/2022-2022   Compliance: 97.7%  Average use on days used: 6 hrs 46 min  Percent of days with usage greater than or equal to 4 hours: 85.9%  PAP range: 12-20 cm H2O  Average 90% pressure: 12.5 cmH2O  Leak: 2 hours 31 minutes  Average AHI: 1.6 events/hr  Mask type: Full face mask  DME: [facility]    Bed time: no set time  Sleep latency: 5-30 minutes  Number of times awakens during the night: 0-2  Wake time: no set time  Estimated total sleep time at night: 4-12 hours  Caffeine intake: 1 cups of coffee, 0 cups of tea, and 0-1 sodas per day  Alcohol intake: 0 drinks per week  Nap time: rare   Sleepiness with Driving: none     [place] - 13      PMHx, FH, SH reviewed and pertinent changes are: Reportedly unchanged from last office visit with us.      REVIEW OF SYSTEMS:   Negative for chest pain, SOA, fever, chills, cough, N/V/D, abdominal pain.    Smoking:none    Mila Fisher  reports that she quit smoking about 26 years ago. She has never used smokeless tobacco.    Exam:  Vitals:    22 1305   BP: 101/54   Pulse: 73   SpO2: 96%           22  1305   Weight: 126 kg (278 lb)     Body mass index is 49.26 kg/m². Class 3 Severe Obesity (BMI >=40). Obesity-related health conditions include the following: obstructive sleep apnea, hypertension, diabetes mellitus, dyslipidemias and GERD.  Obesity is unchanged. BMI is is above average; BMI management plan is completed. I recommend portion control and increasing exercise.    Gen:                No distress, conversant, pleasant, appears stated age, alert, oriented  Eyes:               Anicteric sclera, moist conjunctiva, no lid lag                           PERRL, EOMI   Heent:             NC/AT                          Oropharynx clear                          Normal hearing  Lungs:             Normal effort, non-labored breathing                          Clear to auscultation bilaterally          CV:                  Normal S1/S2, no murmur                          No lower extremity edema  ABD:               Soft, rounded, non-distended                          Normal bowel sounds                    Psych:             Appropriate affect  Neuro:             CN 2-12 appear intact    Past Medical History:   Diagnosis Date   • Acute bronchitis    • Anxiety     Anxiety state      • Arthropathy of lumbar facet joint    • Asthma    • Astigmatism    • At risk for adverse drug event     Adverse drug event resulting from treatment of disorder      • Back ache    • Benign essential hypertension    • CHF (congestive heart failure) ([facility])    • Cholecystitis     mild on ultrasound      • Chronic back pain    • Contact dermatitis    • COPD (chronic obstructive pulmonary disease) ([facility])    • Cough    • Depression     Depressive disorder, not elsewhere classified      • Diabetes mellitus ([facility])     no retinopathy      • Diabetes mellitus without complication ([facility])     type II or unspecified type, not stated as uncontrolled      • Diarrhea    • Drug therapy     Other long term (current) drug therapy      • Dyspnea    • Electrocardiogram abnormal    • Epigastric pain    • Episodic mood disorder ([facility])     Unspecified    • Esophageal reflux    • Esophagitis     grade II   • Foot pain     VS SMALL PHALANX AVULSION      • Generalized abdominal pain    • Generalized anxiety  disorder    • History of colon polyps    • Hypermetropia    • Irritable bowel syndrome    • Knee pain 03/22/2016     being evaluated for knee replacements.       • Malaise and fatigue    • Myofascial pain    • Nausea and vomiting    • Neck pain    • Onychomycosis    • LJ (obstructive sleep apnea)    • Osteoarthritis    • Pain in wrist    • Polyp of intestine     large intestine   • Pure hypercholesterolemia    • Right upper quadrant pain    • Screening for hyperlipidemia    • Shoulder pain 09/02/2014    possible Rotator Cuff Tendinitis      • Spasm of back muscles    • Sprain of sacroiliac ligament    • Stroke (HCC)    • Transient cerebral ischemia    • Type 2 diabetes mellitus (HCC)    • Type II diabetes mellitus, uncontrolled    • Upper respiratory infection    • Vitamin D deficiency        Current Outpatient Medications:   •  albuterol sulfate  (90 Base) MCG/ACT inhaler, INHALE 2 PUFFS EVERY SIX HOURS AS NEEDED, Disp: , Rfl:   •  amitriptyline (ELAVIL) 50 MG tablet, Take 50 mg by mouth every night at bedtime., Disp: , Rfl:   •  Cholecalciferol (Vitamin D3) 1.25 MG (06042 UT) tablet, Take 1 tablet by mouth 1 (One) Time Per Week., Disp: , Rfl:   •  clopidogrel (PLAVIX) 75 MG tablet, Take 75 mg by mouth Daily., Disp: , Rfl:   •  furosemide (LASIX) 40 MG tablet, Take 40 mg by mouth Daily., Disp: , Rfl:   •  glucose blood test strip, 1 each by Other route As Needed. Use as instructed, Disp: , Rfl:   •  glucose blood test strip, Test tid dx code 250.00 on insulin, Disp: , Rfl:   •  HYDROcodone-acetaminophen (NORCO) 7.5-325 MG per tablet, TAKE 1 TABLET FOUR TIMES DAILY AS NEEDED FOR PAIN (EFFECTIVE 3/16/21), Disp: , Rfl:   •  Insulin Glargine (Lantus SoloStar) 100 UNIT/ML injection pen, Inject 80 Units under the skin into the appropriate area as directed Every Night., Disp: , Rfl:   •  Insulin Lispro, 1 Unit Dial, (HUMALOG) 100 UNIT/ML solution pen-injector, INJECT 5 UNITS SUBQ THREE TIMES DAILY BEFORE MEALS,  "Disp: , Rfl:   •  Insulin Syringe-Needle U-100 31G X 5/16\" 0.3 ML misc, , Disp: , Rfl:   •  isosorbide mononitrate (IMDUR) 30 MG 24 hr tablet, Take 30 mg by mouth Daily., Disp: , Rfl:   •  Lancets (ACCU-CHEK MULTICLIX) lancets, Use to test three times a day, Disp: , Rfl:   •  lisinopril (PRINIVIL,ZESTRIL) 10 MG tablet, Take 10 mg by mouth Daily., Disp: , Rfl:   •  lisinopril (PRINIVIL,ZESTRIL) 20 MG tablet, Take 20 mg by mouth Daily., Disp: , Rfl:   •  magnesium oxide (MAGOX) 400 (241.3 Mg) MG tablet tablet, Take 400 mg by mouth 1 (One) Time., Disp: , Rfl:   •  metoprolol tartrate (LOPRESSOR) 25 MG tablet, Take 12.5 mg by mouth Daily., Disp: , Rfl:   •  metroNIDAZOLE (FLAGYL) 500 MG tablet, Take 1 tablet by mouth 2 (Two) Times a Day., Disp: 28 tablet, Rfl: 0  •  pravastatin (PRAVACHOL) 40 MG tablet, Take 40 mg by mouth Daily., Disp: , Rfl:   •  rosuvastatin (CRESTOR) 10 MG tablet, Take 10 mg by mouth Every Evening., Disp: , Rfl:   •  Semaglutide,0.25 or 0.5MG/DOS, (Ozempic, 0.25 or 0.5 MG/DOSE,) 2 MG/1.5ML solution pen-injector, INJECT 0.5MG SUBCUTANEOUSLY EVERY WEEK, Disp: , Rfl:   •  sertraline (ZOLOFT) 50 MG tablet, Take 1 tablet by mouth Daily., Disp: , Rfl:   •  sodium chloride 3 % nebulizer solution, Inhale 4 mL., Disp: , Rfl:   •  triamcinolone (KENALOG) 0.5 % ointment, Apply  topically to the appropriate area as directed., Disp: , Rfl:   •  vitamin D (ERGOCALCIFEROL) 80806 UNITS capsule capsule, Take 50,000 Units by mouth 1 (One) Time Per Week., Disp: , Rfl:     WBC   Date Value Ref Range Status   02/09/2017 4.83 3.20 - 9.80 10*3/mm3 Final     RBC   Date Value Ref Range Status   02/09/2017 4.41 3.77 - 5.16 10*6/mm3 Final     Hemoglobin   Date Value Ref Range Status   02/09/2017 12.1 12.0 - 15.5 g/dL Final     Hematocrit   Date Value Ref Range Status   02/09/2017 36.7 35.0 - 45.0 % Final     MCV   Date Value Ref Range Status   02/09/2017 83.2 80.0 - 98.0 fL Final     MCH   Date Value Ref Range Status "   02/09/2017 27.4 26.5 - 34.0 pg Final     MCHC   Date Value Ref Range Status   02/09/2017 33.0 31.4 - 36.0 g/dL Final     RDW   Date Value Ref Range Status   02/09/2017 13.3 11.5 - 14.5 % Final     RDW-SD   Date Value Ref Range Status   02/09/2017 40.0 36.4 - 46.3 fl Final     MPV   Date Value Ref Range Status   02/09/2017 10.8 8.0 - 12.0 fL Final     Platelets   Date Value Ref Range Status   02/09/2017 126 (L) 150 - 450 10*3/mm3 Final     Neutrophil %   Date Value Ref Range Status   02/09/2017 49.3 37.0 - 80.0 % Final     Lymphocyte %   Date Value Ref Range Status   02/09/2017 42.2 10.0 - 50.0 % Final     Monocyte %   Date Value Ref Range Status   02/09/2017 5.8 0.0 - 12.0 % Final     Eosinophil %   Date Value Ref Range Status   02/09/2017 2.1 0.0 - 7.0 % Final     Basophil %   Date Value Ref Range Status   02/09/2017 0.4 0.0 - 2.0 % Final     Immature Grans %   Date Value Ref Range Status   02/09/2017 0.2 0.0 - 0.5 % Final     Neutrophils, Absolute   Date Value Ref Range Status   02/09/2017 2.38 2.00 - 8.60 10*3/mm3 Final     Lymphocytes, Absolute   Date Value Ref Range Status   02/09/2017 2.04 0.60 - 4.20 10*3/mm3 Final     Monocytes, Absolute   Date Value Ref Range Status   02/09/2017 0.28 0.00 - 0.90 10*3/mm3 Final     Eosinophils, Absolute   Date Value Ref Range Status   02/09/2017 0.10 0.00 - 0.70 10*3/mm3 Final     Basophils, Absolute   Date Value Ref Range Status   02/09/2017 0.02 0.00 - 0.20 10*3/mm3 Final     Immature Grans, Absolute   Date Value Ref Range Status   02/09/2017 0.01 0.00 - 0.02 10*3/mm3 Final       Lab Results   Component Value Date    GLUCOSE 181 (H) 02/09/2017    BUN 24 11/11/2021    CREATININE 1.3 11/11/2021    EGFRIFNONA 55 02/09/2017    EGFRIFAFRI 60 06/03/2021    BCR 22.5 02/09/2017    K 4.6 11/11/2021    CO2 31 11/11/2021    CALCIUM 9.5 11/11/2021    ALBUMIN 4.3 11/11/2021    AST 14 11/11/2021    ALT 16 11/11/2021       Assessment and Plan:    1. Obstructive sleep apnea -  Established, stable (1)  1. Compliant with PAP therapy  2. Continue PAP as prescribed  3. Script for PAP supplies - discussed mask options, patient possibly wants to try [employer]  4. Return to clinic in 1 year with compliance report unless change in symptoms in interim period  2. Morbid obesity - BMI 49.3 - stable chronic illness      I spent 20 minutes caring for Mila on this date of service. This time includes time spent by me in the following activities: preparing for the visit, reviewing tests, obtaining and/or reviewing a separately obtained history, performing a medically appropriate examination and/or evaluation , counseling and educating the patient/family/caregiver, documenting information in the medical record and care coordination; discussing PAP therapy, PAP compliance and PAP maintenance    RTC in 12 months. Patient agrees to return sooner if changes in symptoms.        This document has been electronically signed by NANO Crowder on June 27, 2022 13:18 CDT          CC: Sandra Alonzo APRN          No ref. provider found

## 2023-07-27 ENCOUNTER — OFFICE VISIT (OUTPATIENT)
Dept: FAMILY MEDICINE CLINIC | Facility: CLINIC | Age: 68
End: 2023-07-27
Payer: MEDICARE

## 2023-07-27 VITALS
OXYGEN SATURATION: 96 % | HEIGHT: 63 IN | HEART RATE: 72 BPM | BODY MASS INDEX: 49.79 KG/M2 | DIASTOLIC BLOOD PRESSURE: 64 MMHG | WEIGHT: 281 LBS | SYSTOLIC BLOOD PRESSURE: 116 MMHG

## 2023-07-27 DIAGNOSIS — E78.00 PURE HYPERCHOLESTEROLEMIA: ICD-10-CM

## 2023-07-27 DIAGNOSIS — E11.65 TYPE 2 DIABETES MELLITUS WITH HYPERGLYCEMIA, WITH LONG-TERM CURRENT USE OF INSULIN: Primary | ICD-10-CM

## 2023-07-27 DIAGNOSIS — J44.9 CHRONIC OBSTRUCTIVE PULMONARY DISEASE, UNSPECIFIED COPD TYPE: ICD-10-CM

## 2023-07-27 DIAGNOSIS — E11.40 TYPE 2 DIABETES MELLITUS WITH DIABETIC NEUROPATHY, WITH LONG-TERM CURRENT USE OF INSULIN: ICD-10-CM

## 2023-07-27 DIAGNOSIS — K21.00 GASTROESOPHAGEAL REFLUX DISEASE WITH ESOPHAGITIS, UNSPECIFIED WHETHER HEMORRHAGE: ICD-10-CM

## 2023-07-27 DIAGNOSIS — N18.32 STAGE 3B CHRONIC KIDNEY DISEASE: ICD-10-CM

## 2023-07-27 DIAGNOSIS — Z79.4 TYPE 2 DIABETES MELLITUS WITH DIABETIC NEUROPATHY, WITH LONG-TERM CURRENT USE OF INSULIN: ICD-10-CM

## 2023-07-27 DIAGNOSIS — Z79.4 TYPE 2 DIABETES MELLITUS WITH HYPERGLYCEMIA, WITH LONG-TERM CURRENT USE OF INSULIN: Primary | ICD-10-CM

## 2023-07-27 DIAGNOSIS — E11.9 COMPREHENSIVE DIABETIC FOOT EXAMINATION, TYPE 2 DM, ENCOUNTER FOR: ICD-10-CM

## 2023-07-27 RX ORDER — DICLOFENAC SODIUM 75 MG/1
TABLET, DELAYED RELEASE ORAL
COMMUNITY
Start: 2023-07-21

## 2023-07-27 RX ORDER — ALBUTEROL SULFATE 90 UG/1
2 AEROSOL, METERED RESPIRATORY (INHALATION) EVERY 6 HOURS PRN
Qty: 18 G | Refills: 11 | Status: SHIPPED | OUTPATIENT
Start: 2023-07-27

## 2023-07-27 RX ORDER — FLURBIPROFEN SODIUM 0.3 MG/ML
SOLUTION/ DROPS OPHTHALMIC
COMMUNITY
Start: 2023-07-11

## 2023-08-14 ENCOUNTER — OFFICE VISIT (OUTPATIENT)
Dept: GASTROENTEROLOGY | Facility: CLINIC | Age: 68
End: 2023-08-14
Payer: MEDICARE

## 2023-08-14 VITALS
HEIGHT: 63 IN | SYSTOLIC BLOOD PRESSURE: 130 MMHG | DIASTOLIC BLOOD PRESSURE: 89 MMHG | HEART RATE: 69 BPM | WEIGHT: 283.4 LBS | BODY MASS INDEX: 50.21 KG/M2

## 2023-08-14 DIAGNOSIS — K21.9 GASTROESOPHAGEAL REFLUX DISEASE, UNSPECIFIED WHETHER ESOPHAGITIS PRESENT: ICD-10-CM

## 2023-08-14 DIAGNOSIS — R10.13 EPIGASTRIC PAIN: ICD-10-CM

## 2023-08-14 DIAGNOSIS — R13.10 DYSPHAGIA, UNSPECIFIED TYPE: Primary | ICD-10-CM

## 2023-08-14 DIAGNOSIS — D64.9 MILD ANEMIA: ICD-10-CM

## 2023-08-14 PROCEDURE — 1160F RVW MEDS BY RX/DR IN RCRD: CPT | Performed by: PHYSICIAN ASSISTANT

## 2023-08-14 PROCEDURE — 99214 OFFICE O/P EST MOD 30 MIN: CPT | Performed by: PHYSICIAN ASSISTANT

## 2023-08-14 PROCEDURE — 1159F MED LIST DOCD IN RCRD: CPT | Performed by: PHYSICIAN ASSISTANT

## 2023-08-14 PROCEDURE — 3079F DIAST BP 80-89 MM HG: CPT | Performed by: PHYSICIAN ASSISTANT

## 2023-08-14 PROCEDURE — 3075F SYST BP GE 130 - 139MM HG: CPT | Performed by: PHYSICIAN ASSISTANT

## 2023-08-14 RX ORDER — DEXTROSE AND SODIUM CHLORIDE 5; .45 G/100ML; G/100ML
30 INJECTION, SOLUTION INTRAVENOUS CONTINUOUS PRN
OUTPATIENT
Start: 2023-08-14

## 2023-08-14 NOTE — PROGRESS NOTES
Chief Complaint   Patient presents with    Difficulty Swallowing       ENDO PROCEDURE ORDERED: EGDw/dilation, dyphagia, gerd    Subjective    Mila Fisher is a 68 y.o. female. she is here today for follow-up.    History of Present Illness    Patient presents with complaints of dysphagia.  Recently saw NANO Oliveira, saw the patient with numbness, tingling, diabetes, hypertension 07/27/2023.  I previously saw her 03/31/2021, was treated for an H. Pylori.  She had EGD/colonoscopy 03/24/2021 showed esophagitis, gastritis with positive H. Pylori. She had multiple polyps.  She will be due for surveillance colonoscopy next year.  Recently she has been having difficulty with dysphagia.  She recently got choked on some cornbread.  She is coughing after she eats.  She states she has had her teeth pulled.  She has noticed a knot under her chin and throat.  Her father had similar issues.  Bowels are moving without blood or mucus.  Weight is up 3-1/2 pounds since last visit.      Laboratory 07/05/2023 CMP showed a glucose 59, BUN 27, creatinine 1.43, GFR 39.5, otherwise normal.  CBC showed a hemoglobin 11.5, cholesterol panel showed an LDL of 123, A1c 6.2, B12 low at 223, normal vitamin D, TSH.      A/P:  Patient with dysphagia, suspect peptic stricture, consider dysmotility.  Recommend EGD with dilatation.  She is not currently on medication.  Does have mild anemia, likely multifactorial, low normal B12.  Will do biopsies as indicated. Will plan follow-up after the above, further pending clinical course and the results of the above.           The following portions of the patient's history were reviewed and updated as appropriate:   Past Medical History:   Diagnosis Date    Acute bronchitis     Anxiety     Anxiety state       Arthropathy of lumbar facet joint     Asthma     Astigmatism     At risk for adverse drug event     Adverse drug event resulting from treatment of disorder       Back ache     Benign  essential hypertension     CHF (congestive heart failure)     Cholecystitis     mild on ultrasound       Chronic back pain     Contact dermatitis     COPD (chronic obstructive pulmonary disease)     Cough     Depression     Depressive disorder, not elsewhere classified       Diabetes mellitus     no retinopathy       Diabetes mellitus without complication     type II or unspecified type, not stated as uncontrolled       Diarrhea     Drug therapy     Other long term (current) drug therapy       Dyspnea     Electrocardiogram abnormal     Epigastric pain     Episodic mood disorder     Unspecified     Esophageal reflux     Esophagitis     grade II    Foot pain     VS SMALL PHALANX AVULSION       Generalized abdominal pain     Generalized anxiety disorder     History of colon polyps     Hypermetropia     Irritable bowel syndrome     Knee pain 03/22/2016     being evaluated for knee replacements.        Malaise and fatigue     Myofascial pain     Nausea and vomiting     Neck pain     Onychomycosis     LJ (obstructive sleep apnea)     Osteoarthritis     Pain in wrist     Polyp of intestine     large intestine    Pure hypercholesterolemia     Right upper quadrant pain     Screening for hyperlipidemia     Shoulder pain 09/02/2014    possible Rotator Cuff Tendinitis       Spasm of back muscles     Sprain of sacroiliac ligament     Stroke     Transient cerebral ischemia     Type 2 diabetes mellitus     Type II diabetes mellitus, uncontrolled     Upper respiratory infection     Vitamin D deficiency      Past Surgical History:   Procedure Laterality Date    CARPAL TUNNEL RELEASE      CATARACT EXTRACTION W/ INTRAOCULAR LENS IMPLANT Right 3/20/2018    Procedure: CATARACT PHACO EXTRACTION WITH INTRAOCULAR LENS IMPLANT;  Surgeon: Mateus Palencia MD;  Location: [facility];  Service: Ophthalmology    CATARACT EXTRACTION W/ INTRAOCULAR LENS IMPLANT Left 3/27/2018    Procedure: CATARACT PHACO EXTRACTION WITH INTRAOCULAR LENS  IMPLANT;  Surgeon: Mateus Palencia MD;  Location: [facility] OR;  Service: Ophthalmology    COLONOSCOPY  06/10/2015    COLONOSCOPY N/A 2018    Procedure: COLONOSCOPY--bx;  Surgeon: Rg Cr MD;  Location: [facility] ENDOSCOPY;  Service:     COLONOSCOPY N/A 3/24/2021    Procedure: COLONOSCOPY;  Surgeon: Rg Cr MD;  Location: [facility] ENDOSCOPY;  Service: Gastroenterology;  Laterality: N/A;    COLONOSCOPY W/ POLYPECTOMY  06/10/2015    Colonoscopy remove polyps 32520 (1)       ENDOSCOPY  06/10/2015    EGD w/ biopsy 17442 (1)       ENDOSCOPY N/A 2018    Procedure: ESOPHAGOGASTRODUODENOSCOPY--urgent, bx;  Surgeon: Rg Cr MD;  Location: [facility] ENDOSCOPY;  Service:     ENDOSCOPY N/A 3/24/2021    Procedure: ESOPHAGOGASTRODUODENOSCOPY;  Surgeon: Rg Cr MD;  Location: [facility] ENDOSCOPY;  Service: Gastroenterology;  Laterality: N/A;    INJECTION OF MEDICATION  2014    Drain/Inject Intermed Joint  (1)       TUBAL ABDOMINAL LIGATION      Tubal ligation (1)       UPPER GASTROINTESTINAL ENDOSCOPY  2018     Family History   Problem Relation Age of Onset    Heart disease Other     Bone cancer Other     Lung cancer Other     Throat cancer Other     Cancer Other         Other    Colon cancer Other         Colorectal Cancer    Diabetes Other     Hypertension Other      OB History    No obstetric history on file.       No Known Allergies  Social History     Socioeconomic History    Marital status: Single   Tobacco Use    Smoking status: Former     Types: Cigarettes     Quit date:      Years since quittin.6    Smokeless tobacco: Never    Tobacco comments:     non smoker for years   Vaping Use    Vaping Use: Never used   Substance and Sexual Activity    Alcohol use: No    Drug use: No    Sexual activity: Defer     Current Medications:  Prior to Admission medications    Medication Sig Start Date End Date Taking? Authorizing Provider   albuterol sulfate  (90 Base)  "MCG/ACT inhaler Inhale 2 puffs Every 6 (Six) Hours As Needed for Wheezing or Shortness of Air. 7/27/23  Yes Charleen Saavedra APRN   amitriptyline (ELAVIL) 50 MG tablet Take 1 tablet by mouth every night at bedtime. 2/13/18  Yes Irma Coker MD   atorvastatin (LIPITOR) 20 MG tablet Take 1 tablet by mouth Daily. Take 1 tablet by mouth every evening   Yes Irma Coker MD   B-D UF III MINI PEN NEEDLES 31G X 5 MM misc  7/11/23  Yes Irma Coker MD   Cholecalciferol (Vitamin D3) 1.25 MG (03311 UT) tablet Take 1 tablet by mouth 1 (One) Time Per Week.   Yes Irma Coker MD   clopidogrel (PLAVIX) 75 MG tablet Take 1 tablet by mouth Daily. 3/8/21  Yes Irma Coker MD   furosemide (LASIX) 40 MG tablet Take 1 tablet by mouth Daily.   Yes Irma Coker MD   glucose blood test strip 1 each by Other route As Needed. Use as instructed   Yes Irma Coker MD   HYDROcodone-acetaminophen (NORCO) 7.5-325 MG per tablet TAKE 1 TABLET FOUR TIMES DAILY AS NEEDED FOR PAIN (EFFECTIVE 3/16/21) 3/16/21  Yes Irma Coker MD   Insulin Glargine (Lantus SoloStar) 100 UNIT/ML injection pen Inject 80 Units under the skin into the appropriate area as directed Every Night.   Yes Irma Coker MD   Insulin Lispro, 1 Unit Dial, (HUMALOG) 100 UNIT/ML solution pen-injector INJECT 5 UNITS SUBQ THREE TIMES DAILY BEFORE MEALS 4/11/22  Yes Irma Coker MD   Insulin Syringe-Needle U-100 31G X 5/16\" 0.3 ML misc    Yes Irma Coker MD   isosorbide mononitrate (IMDUR) 30 MG 24 hr tablet Take 1 tablet by mouth Daily. 3/8/21  Yes Irma Coker MD   Lancets (ACCU-CHEK MULTICLIX) lancets Use to test three times a day 6/16/15  Yes Irma Coker MD   lisinopril (PRINIVIL,ZESTRIL) 10 MG tablet Take 1 tablet by mouth Daily. 5/20/22  Yes Irma Coker MD   magnesium oxide (MAGOX) 400 (241.3 Mg) MG tablet tablet Take 1 tablet by mouth 1 (One) Time. 12/28/20 " " Yes Irma Coker MD   metoprolol tartrate (LOPRESSOR) 25 MG tablet Take 0.5 tablets by mouth Daily. 5/20/22  Yes Irma Ckoer MD   Semaglutide,0.25 or 0.5MG/DOS, (Ozempic, 0.25 or 0.5 MG/DOSE,) 2 MG/1.5ML solution pen-injector INJECT 0.5MG SUBCUTANEOUSLY EVERY WEEK 5/18/22  Yes Irma Coker MD   sertraline (ZOLOFT) 50 MG tablet Take 2 tablets by mouth Daily. Take 1 tablet by mouth nightly 5/18/22  Yes Irma Coker MD   vitamin D (ERGOCALCIFEROL) 16690 UNITS capsule capsule Take 1 capsule by mouth 1 (One) Time Per Week.   Yes Irma Coker MD   diclofenac (VOLTAREN) 75 MG EC tablet  7/21/23   ProviderIrma MD     Review of Systems  Review of Systems   Constitutional:  Negative for unexpected weight change.   HENT:  Positive for trouble swallowing.    Respiratory:  Positive for cough.    Gastrointestinal:  Positive for abdominal pain.   Hematological:  Positive for adenopathy.        Objective    /89   Pulse 69   Ht 160 cm (62.99\")   Wt 129 kg (283 lb 6.4 oz)   BMI 50.22 kg/mý   Physical Exam  Vitals and nursing note reviewed.   Constitutional:       General: She is not in acute distress.     Appearance: She is well-developed. She is obese. She is ill-appearing.   HENT:      Head: Normocephalic and atraumatic.   Eyes:      Pupils: Pupils are equal, round, and reactive to light.   Cardiovascular:      Rate and Rhythm: Normal rate and regular rhythm.      Heart sounds: Normal heart sounds.   Pulmonary:      Effort: Pulmonary effort is normal.      Breath sounds: Normal breath sounds.   Abdominal:      General: Bowel sounds are normal. There is no distension or abdominal bruit.      Palpations: Abdomen is soft. Abdomen is not rigid. There is no shifting dullness or mass.      Tenderness: There is abdominal tenderness. There is no guarding or rebound.      Hernia: No hernia is present. There is no hernia in the ventral area.   Musculoskeletal:         General: " Normal range of motion.      Cervical back: Normal range of motion.   Skin:     General: Skin is warm and dry.   Neurological:      Mental Status: She is alert and oriented to person, place, and time.   Psychiatric:         Behavior: Behavior normal.         Thought Content: Thought content normal.         Judgment: Judgment normal.     Assessment & Plan      1. Dysphagia, unspecified type    2. Epigastric pain    3. Gastroesophageal reflux disease, unspecified whether esophagitis present    4. Mild anemia    .   Diagnoses and all orders for this visit:    1. Dysphagia, unspecified type (Primary)  -     Case Request; Standing  -     dextrose 5 % and sodium chloride 0.45 % infusion  -     Case Request    2. Epigastric pain  -     Case Request; Standing  -     dextrose 5 % and sodium chloride 0.45 % infusion  -     Case Request    3. Gastroesophageal reflux disease, unspecified whether esophagitis present  -     Case Request; Standing  -     dextrose 5 % and sodium chloride 0.45 % infusion  -     Case Request    4. Mild anemia  -     Case Request; Standing  -     dextrose 5 % and sodium chloride 0.45 % infusion  -     Case Request    Other orders  -     Obtain Informed Consent; Standing  -     Follow Anesthesia Guidelines / Protocol; Future  -     Obtain Informed Consent; Future  -     POC Glucose Once; Standing        Orders placed during this encounter include:  Orders Placed This Encounter   Procedures    Obtain Informed Consent     Standing Status:   Future     Order Specific Question:   Informed Consent Given For     Answer:   ESOPHAGOGASTRODUODENOSCOPY WITH DILATATION       Medications prescribed:  No orders of the defined types were placed in this encounter.      Requested Prescriptions      No prescriptions requested or ordered in this encounter       Review and/or summary of lab tests, radiology, procedures, medications. Review and summary of old records and obtaining of history. The risks and benefits of my  recommendations, as well as other treatment options were discussed with the patient today. Questions were answered.    Follow-up: Return in about 5 weeks (around 9/18/2023), or if symptoms worsen or fail to improve.     ESOPHAGOGASTRODUODENOSCOPY WITH DILATATION (N/A)      This document has been electronically signed by Jarett Mcdonough PA-C on August 28, 2023 19:23 CDT      Results for orders placed or performed in visit on 07/05/23   CBC Auto Differential    Specimen: Blood   Result Value Ref Range    WBC 7.58 3.40 - 10.80 10*3/mm3    RBC 4.12 3.77 - 5.28 10*6/mm3    Hemoglobin 11.5 (L) 12.0 - 15.9 g/dL    Hematocrit 34.1 34.0 - 46.6 %    MCV 82.8 79.0 - 97.0 fL    MCH 27.9 26.6 - 33.0 pg    MCHC 33.7 31.5 - 35.7 g/dL    RDW 13.8 12.3 - 15.4 %    RDW-SD 40.9 37.0 - 54.0 fl    MPV 11.4 6.0 - 12.0 fL    Platelets 226 140 - 450 10*3/mm3    Neutrophil % 54.2 42.7 - 76.0 %    Lymphocyte % 34.7 19.6 - 45.3 %    Monocyte % 7.4 5.0 - 12.0 %    Eosinophil % 2.8 0.3 - 6.2 %    Basophil % 0.5 0.0 - 1.5 %    Immature Grans % 0.4 0.0 - 0.5 %    Neutrophils, Absolute 4.11 1.70 - 7.00 10*3/mm3    Lymphocytes, Absolute 2.63 0.70 - 3.10 10*3/mm3    Monocytes, Absolute 0.56 0.10 - 0.90 10*3/mm3    Eosinophils, Absolute 0.21 0.00 - 0.40 10*3/mm3    Basophils, Absolute 0.04 0.00 - 0.20 10*3/mm3    Immature Grans, Absolute 0.03 0.00 - 0.05 10*3/mm3    nRBC 0.0 0.0 - 0.2 /100 WBC   Microalbumin / Creatinine Urine Ratio - Urine, Clean Catch    Specimen: Urine, Clean Catch   Result Value Ref Range    Microalbumin/Creatinine Ratio      Creatinine, Urine 80.3 mg/dL    Microalbumin, Urine <1.2 mg/dL   Vitamin D,25-Hydroxy    Specimen: Blood   Result Value Ref Range    25 Hydroxy, Vitamin D 71.5 30.0 - 100.0 ng/ml   TSH    Specimen: Blood   Result Value Ref Range    TSH 2.540 0.270 - 4.200 uIU/mL   Hemoglobin A1c    Specimen: Blood   Result Value Ref Range    Hemoglobin A1C 6.20 (H) 4.80 - 5.60 %   Vitamin B12    Specimen: Blood   Result  Value Ref Range    Vitamin B-12 223 211 - 946 pg/mL   Lipid Panel    Specimen: Blood   Result Value Ref Range    Total Cholesterol 189 0 - 200 mg/dL    Triglycerides 105 0 - 150 mg/dL    HDL Cholesterol 47 40 - 60 mg/dL    LDL Cholesterol  123 (H) 0 - 100 mg/dL    VLDL Cholesterol 19 5 - 40 mg/dL    LDL/HDL Ratio 2.57    Comprehensive Metabolic Panel    Specimen: Blood   Result Value Ref Range    Glucose 59 (L) 65 - 99 mg/dL    BUN 27 (H) 8 - 23 mg/dL    Creatinine 1.43 (H) 0.57 - 1.00 mg/dL    Sodium 142 136 - 145 mmol/L    Potassium 4.7 3.5 - 5.2 mmol/L    Chloride 105 98 - 107 mmol/L    CO2 26.5 22.0 - 29.0 mmol/L    Calcium 9.9 8.6 - 10.5 mg/dL    Total Protein 7.1 6.0 - 8.5 g/dL    Albumin 4.5 3.5 - 5.2 g/dL    ALT (SGPT) 14 1 - 33 U/L    AST (SGOT) 15 1 - 32 U/L    Alkaline Phosphatase 59 39 - 117 U/L    Total Bilirubin 0.3 0.0 - 1.2 mg/dL    Globulin 2.6 gm/dL    A/G Ratio 1.7 g/dL    BUN/Creatinine Ratio 18.9 7.0 - 25.0    Anion Gap 10.5 5.0 - 15.0 mmol/L    eGFR 39.5 (L) >60.0 mL/min/1.73   Results for orders placed or performed during the hospital encounter of 03/24/21   Tissue Pathology Exam    Specimen: A: Gastric, Antrum; Tissue    B: Esophagus, Distal; Tissue    C: Large Intestine, Sigmoid Colon; Tissue   Result Value Ref Range    Case Report       Surgical Pathology Report                         Case: [id number]                                  Authorizing Provider:  Rg Cr MD        Collected:           03/24/2021 10:26 AM          Ordering Location:     [facility]             Received:            03/24/2021 01:38 PM                                 [place] [facility]                                                     Pathologist:           Ranjan Saunders MD                                                           Specimens:   1) - Gastric, Antrum                                                                                2) - Esophagus, Distal                                                                               3) - Large Intestine, Sigmoid Colon, polyps                                                Final Diagnosis       SEE SCANNED REPORT       POC Glucose Once    Specimen: Blood   Result Value Ref Range    Glucose 195 (H) 70 - 130 mg/dL   Results for orders placed or performed in visit on 03/21/21   COVID-19,[facility] IN-HOUSE, NP/OP SWAB IN UTM/VTM/[facility] TRANSPORT MEDIA,24 HR TAT - Swab, Nasopharynx    Specimen: Nasopharynx; Swab   Result Value Ref Range    COVID19 Not Detected Not Detected - Ref. Range   Results for orders placed or performed during the hospital encounter of 03/27/18   POC Glucose Once    Specimen: Blood   Result Value Ref Range    Glucose 268 (H) 70 - 130 mg/dL   Results for orders placed or performed during the hospital encounter of 03/20/18   POC Glucose Once    Specimen: Blood   Result Value Ref Range    Glucose 201 (H) 70 - 130 mg/dL   Results for orders placed or performed during the hospital encounter of 01/26/18   Tissue Pathology Exam - Tissue, Gastric, Antrum    Specimen: A: Gastric, Antrum; Tissue    B: Small Intestine, Duodenum; Tissue    C: Large Intestine; Tissue    D: Large Intestine, Sigmoid Colon; Tissue   Result Value Ref Range    Case Report       Surgical Pathology Report                         Case: [id number]                                  Authorizing Provider:  Rg Cr MD         Collected:           01/26/2018 03:03 PM          Ordering Location:     [facility]             Received:            01/27/2018 09:59 AM                                 [place] [facility]                                                     Pathologist:           Elier Chris MD                                                          Specimens:   1) - Gastric, Antrum, antrum                                                                        2) - Small Intestine, Duodenum, small bowel                                                          3) - Large Intestine, colonic mucosa                                                                4) - Large Intestine, Sigmoid Colon, sigmoid poly cold snare                               Final Diagnosis       1.  MUCOSA, ANTRUM OF STOMACH:  ACTIVE CHRONIC GASTRITIS.  POSITIVE FOR HELICOBACTER PYLORI (HP IMMUNOSTAIN).    2.  MUCOSA, DUODENUM:  NO SIGNIFICANT HISTOLOGIC ABNORMALITY.    3.  MUCOSA, COLON:  NO SIGNIFICANT HISTOLOGIC ABNORMALITY.    4.  POLYP, SIGMOID COLON:  HYPERPLASTIC POLYP.      Comment       Helicobacter pylori (HP) immunostain is performed because an appropriate inflammatory milieu is present and organisms are not seen on H & E stained slides.    HP immunostain was developed and its performance characteristics determined by [facility].  It has not been cleared or approved by the U.S. Food and Drug Administration.  The FDA has determined that such clearance or approval is not necessary.  This test is used for clinical purposes.  It should not be regarded as investigational or for research.  This laboratory is certified under the Clinical Laboratory Improvement Amendments of 1988 (CLIA-88) as qualified to perform high complexity clinical laboratory testing.          Gross Description       Received for examination are 4 containers, each of which have nodular bits of white soft tissue measuring 0.3-0.5 cc in aggregate.  All specimens are embedded as labeled.  1A antrum of stomach; 2A duodenum; 3A mucosa of colon; 4A polyp, sigmoid colon (cold snare).      Embedded Images     POC Glucose Once    Specimen: Blood   Result Value Ref Range    Glucose 179 (H) 70 - 130 mg/dL   Results for orders placed or performed in visit on 10/31/17   ToxASSURE Select 13 (MW) - Urine, Clean Catch    Specimen: Urine, Clean Catch   Result Value Ref Range    Report Summary FINAL    Results for orders placed or performed in visit on 09/11/17   Hepatitis panel, acute     Specimen: Blood   Result Value Ref Range    Hepatitis C Ab Negative Negative    Hep A IgM Negative Negative    Hep B C IgM Negative Negative    Hepatitis B Surface Ag Negative Negative   Results for orders placed or performed in visit on 03/07/17   ToxASSURE Select 13 (MW)    Specimen: Urine, Clean Catch   Result Value Ref Range    Report Summary FINAL     PDF Image .    Results for orders placed or performed during the hospital encounter of 02/08/17   Gold Top - SST   Result Value Ref Range    Extra Tube Hold for add-ons.    Green Top (Gel)   Result Value Ref Range    Extra Tube Hold for add-ons.    CK-MB    Specimen: Blood   Result Value Ref Range    CKMB 0.68 0.00 - 5.00 ng/mL   CBC Auto Differential    Specimen: Blood   Result Value Ref Range    WBC 4.83 3.20 - 9.80 10*3/mm3    RBC 4.41 3.77 - 5.16 10*6/mm3    Hemoglobin 12.1 12.0 - 15.5 g/dL    Hematocrit 36.7 35.0 - 45.0 %    MCV 83.2 80.0 - 98.0 fL    MCH 27.4 26.5 - 34.0 pg    MCHC 33.0 31.4 - 36.0 g/dL    RDW 13.3 11.5 - 14.5 %    RDW-SD 40.0 36.4 - 46.3 fl    MPV 10.8 8.0 - 12.0 fL    Platelets 126 (L) 150 - 450 10*3/mm3    Neutrophil % 49.3 37.0 - 80.0 %    Lymphocyte % 42.2 10.0 - 50.0 %    Monocyte % 5.8 0.0 - 12.0 %    Eosinophil % 2.1 0.0 - 7.0 %    Basophil % 0.4 0.0 - 2.0 %    Immature Grans % 0.2 0.0 - 0.5 %    Neutrophils, Absolute 2.38 2.00 - 8.60 10*3/mm3    Lymphocytes, Absolute 2.04 0.60 - 4.20 10*3/mm3    Monocytes, Absolute 0.28 0.00 - 0.90 10*3/mm3    Eosinophils, Absolute 0.10 0.00 - 0.70 10*3/mm3    Basophils, Absolute 0.02 0.00 - 0.20 10*3/mm3    Immature Grans, Absolute 0.01 0.00 - 0.02 10*3/mm3     *Note: Due to a large number of results and/or encounters for the requested time period, some results have not been displayed. A complete set of results can be found in Results Review.

## 2023-09-06 RX ORDER — AMITRIPTYLINE HYDROCHLORIDE 50 MG/1
TABLET, FILM COATED ORAL
Qty: 30 TABLET | Refills: 0 | Status: SHIPPED | OUTPATIENT
Start: 2023-09-06

## 2023-09-06 RX ORDER — LANOLIN ALCOHOL/MO/W.PET/CERES
CREAM (GRAM) TOPICAL
Qty: 180 TABLET | Refills: 0 | Status: SHIPPED | OUTPATIENT
Start: 2023-09-06

## 2023-09-06 RX ORDER — CHOLECALCIFEROL (VITAMIN D3) 1250 MCG
CAPSULE ORAL
Qty: 4 CAPSULE | Refills: 0 | Status: SHIPPED | OUTPATIENT
Start: 2023-09-06

## 2023-09-06 RX ORDER — FUROSEMIDE 40 MG/1
40 TABLET ORAL DAILY
Qty: 90 TABLET | Refills: 0 | Status: SHIPPED | OUTPATIENT
Start: 2023-09-06

## 2023-09-20 ENCOUNTER — ANESTHESIA (OUTPATIENT)
Dept: GASTROENTEROLOGY | Facility: HOSPITAL | Age: 68
End: 2023-09-20
Payer: MEDICARE

## 2023-09-20 ENCOUNTER — ANESTHESIA EVENT (OUTPATIENT)
Dept: GASTROENTEROLOGY | Facility: HOSPITAL | Age: 68
End: 2023-09-20
Payer: MEDICARE

## 2023-09-20 ENCOUNTER — HOSPITAL ENCOUNTER (OUTPATIENT)
Facility: HOSPITAL | Age: 68
Setting detail: HOSPITAL OUTPATIENT SURGERY
Discharge: HOME | End: 2023-09-20
Attending: INTERNAL MEDICINE | Admitting: INTERNAL MEDICINE

## 2023-09-20 VITALS
SYSTOLIC BLOOD PRESSURE: 143 MMHG | WEIGHT: 274 LBS | HEIGHT: 63 IN | TEMPERATURE: 96.5 F | DIASTOLIC BLOOD PRESSURE: 68 MMHG | HEART RATE: 72 BPM | OXYGEN SATURATION: 96 % | BODY MASS INDEX: 48.55 KG/M2 | RESPIRATION RATE: 18 BRPM

## 2023-09-20 DIAGNOSIS — R13.10 DYSPHAGIA, UNSPECIFIED TYPE: ICD-10-CM

## 2023-09-20 DIAGNOSIS — R10.13 EPIGASTRIC PAIN: ICD-10-CM

## 2023-09-20 DIAGNOSIS — D64.9 MILD ANEMIA: ICD-10-CM

## 2023-09-20 DIAGNOSIS — K21.9 GASTROESOPHAGEAL REFLUX DISEASE, UNSPECIFIED WHETHER ESOPHAGITIS PRESENT: ICD-10-CM

## 2023-09-20 LAB — GLUCOSE BLDC GLUCOMTR-MCNC: 155 MG/DL (ref 70–130)

## 2023-09-20 PROCEDURE — 43248 EGD GUIDE WIRE INSERTION: CPT | Performed by: INTERNAL MEDICINE

## 2023-09-20 PROCEDURE — 88305 TISSUE EXAM BY PATHOLOGIST: CPT

## 2023-09-20 PROCEDURE — 82948 REAGENT STRIP/BLOOD GLUCOSE: CPT

## 2023-09-20 PROCEDURE — C1769 GUIDE WIRE: HCPCS | Performed by: INTERNAL MEDICINE

## 2023-09-20 PROCEDURE — 25010000002 PROPOFOL 10 MG/ML EMULSION

## 2023-09-20 PROCEDURE — 43239 EGD BIOPSY SINGLE/MULTIPLE: CPT | Performed by: INTERNAL MEDICINE

## 2023-09-20 RX ORDER — LIDOCAINE HYDROCHLORIDE 20 MG/ML
INJECTION, SOLUTION EPIDURAL; INFILTRATION; INTRACAUDAL; PERINEURAL AS NEEDED
Status: DISCONTINUED | OUTPATIENT
Start: 2023-09-20 | End: 2023-09-20 | Stop reason: SURG

## 2023-09-20 RX ORDER — DEXTROSE AND SODIUM CHLORIDE 5; .45 G/100ML; G/100ML
30 INJECTION, SOLUTION INTRAVENOUS CONTINUOUS PRN
Status: DISCONTINUED | OUTPATIENT
Start: 2023-09-20 | End: 2023-09-20 | Stop reason: HOSPADM

## 2023-09-20 RX ORDER — PROPOFOL 10 MG/ML
VIAL (ML) INTRAVENOUS AS NEEDED
Status: DISCONTINUED | OUTPATIENT
Start: 2023-09-20 | End: 2023-09-20 | Stop reason: SURG

## 2023-09-20 RX ADMIN — DEXTROSE AND SODIUM CHLORIDE 30 ML/HR: 5; 450 INJECTION, SOLUTION INTRAVENOUS at 09:38

## 2023-09-20 RX ADMIN — LIDOCAINE HYDROCHLORIDE 100 MG: 20 INJECTION, SOLUTION EPIDURAL; INFILTRATION; INTRACAUDAL; PERINEURAL at 10:38

## 2023-09-20 RX ADMIN — PROPOFOL 180 MG: 10 INJECTION, EMULSION INTRAVENOUS at 10:38

## 2023-09-20 NOTE — ANESTHESIA POSTPROCEDURE EVALUATION
Patient: Mila Fisher    Procedure Summary       Date: 09/20/23 Room / Location: [facility] ENDOSCOPY 3 / [facility] ENDOSCOPY    Anesthesia Start: 1037 Anesthesia Stop: 1050    Procedure: ESOPHAGOGASTRODUODENOSCOPY WITH DILATATION Diagnosis:       Dysphagia, unspecified type      Epigastric pain      Gastroesophageal reflux disease, unspecified whether esophagitis present      Mild anemia      (Dysphagia, unspecified type [R13.10])      (Epigastric pain [R10.13])      (Gastroesophageal reflux disease, unspecified whether esophagitis present [K21.9])      (Mild anemia [D64.9])    Surgeons: Rg Cr MD Provider: Robyn Rivera CRNA    Anesthesia Type: general ASA Status: 4            Anesthesia Type: general    Vitals  No vitals data found for the desired time range.          Post Anesthesia Care and Evaluation    Patient location during evaluation: bedside  Patient participation: waiting for patient participation  Level of consciousness: sleepy but conscious  Pain score: 0  Pain management: adequate    Airway patency: patent  Anesthetic complications: No anesthetic complications  PONV Status: none  Cardiovascular status: acceptable  Respiratory status: acceptable, spontaneous ventilation and room air  Hydration status: acceptable

## 2023-09-20 NOTE — ANESTHESIA PREPROCEDURE EVALUATION
Anesthesia Evaluation     Patient summary reviewed and Nursing notes reviewed   NPO Solid Status: > 8 hours  NPO Liquid Status: > 2 hours           Airway   Mallampati: III  TM distance: >3 FB  Neck ROM: full  No difficulty expected  Dental    (+) edentulous    Pulmonary - normal exam   (+) COPD moderate, asthma,shortness of breath, recent URI resolved, sleep apnea on CPAP  Cardiovascular - normal exam    PT is on anticoagulation therapy  Patient on routine beta blocker    (+) hypertension well controlled 2 medications or greater, CHF Systolic <55%, hyperlipidemia      Neuro/Psych  (+) CVA, psychiatric history Anxiety and Depression  GI/Hepatic/Renal/Endo    (+) GERD poorly controlled, diabetes mellitus type 2 poorly controlled using insulin    Musculoskeletal     (+) neck pain  Abdominal  - normal exam   Substance History - negative use     OB/GYN negative ob/gyn ROS         Other   arthritis,                     Anesthesia Plan    ASA 4     general   total IV anesthesia  intravenous induction     Anesthetic plan, risks, benefits, and alternatives have been provided, discussed and informed consent has been obtained with: patient.    Plan discussed with CRNA.

## 2023-09-20 NOTE — H&P
Chief Complaint   Patient presents with    Difficulty Swallowing       ENDO PROCEDURE ORDERED: EGDw/dilation, dyphagia, gerd    Subjective    Mila Fisher is a 68 y.o. female. she is here today for follow-up.    History of Present Illness I agree with the current note with no changes in the history.  Risks and benefits discussed with patient. Patient understands these and would like to proceed with procedure.      Patient presents with complaints of dysphagia.  Recently saw NANO Oliveira, saw the patient with numbness, tingling, diabetes, hypertension 07/27/2023.  I previously saw her 03/31/2021, was treated for an H. Pylori.  She had EGD/colonoscopy 03/24/2021 showed esophagitis, gastritis with positive H. Pylori. She had multiple polyps.  She will be due for surveillance colonoscopy next year.  Recently she has been having difficulty with dysphagia.  She recently got choked on some cornbread.  She is coughing after she eats.  She states she has had her teeth pulled.  She has noticed a knot under her chin and throat.  Her father had similar issues.  Bowels are moving without blood or mucus.  Weight is up 3-1/2 pounds since last visit.      Laboratory 07/05/2023 CMP showed a glucose 59, BUN 27, creatinine 1.43, GFR 39.5, otherwise normal.  CBC showed a hemoglobin 11.5, cholesterol panel showed an LDL of 123, A1c 6.2, B12 low at 223, normal vitamin D, TSH.      A/P:  Patient with dysphagia, suspect peptic stricture, consider dysmotility.  Recommend EGD with dilatation.  She is not currently on medication.  Does have mild anemia, likely multifactorial, low normal B12.  Will do biopsies as indicated. Will plan follow-up after the above, further pending clinical course and the results of the above.           The following portions of the patient's history were reviewed and updated as appropriate:   Past Medical History:   Diagnosis Date    Acute bronchitis     Anxiety     Anxiety state       Arthropathy  of lumbar facet joint     Asthma     Astigmatism     At risk for adverse drug event     Adverse drug event resulting from treatment of disorder       Back ache     Benign essential hypertension     CHF (congestive heart failure)     Cholecystitis     mild on ultrasound       Chronic back pain     Contact dermatitis     COPD (chronic obstructive pulmonary disease)     Cough     Depression     Depressive disorder, not elsewhere classified       Diabetes mellitus     no retinopathy       Diabetes mellitus without complication     type II or unspecified type, not stated as uncontrolled       Diarrhea     Drug therapy     Other long term (current) drug therapy       Dyspnea     Electrocardiogram abnormal     Epigastric pain     Episodic mood disorder     Unspecified     Esophageal reflux     Esophagitis     grade II    Foot pain     VS SMALL PHALANX AVULSION       Generalized abdominal pain     Generalized anxiety disorder     History of colon polyps     Hypermetropia     Irritable bowel syndrome     Knee pain 03/22/2016     being evaluated for knee replacements.        Malaise and fatigue     Myofascial pain     Nausea and vomiting     Neck pain     Onychomycosis     LJ (obstructive sleep apnea)     Osteoarthritis     Pain in wrist     Polyp of intestine     large intestine    Pure hypercholesterolemia     Right upper quadrant pain     Screening for hyperlipidemia     Shoulder pain 09/02/2014    possible Rotator Cuff Tendinitis       Spasm of back muscles     Sprain of sacroiliac ligament     Stroke     Transient cerebral ischemia     Type 2 diabetes mellitus     Type II diabetes mellitus, uncontrolled     Upper respiratory infection     Vitamin D deficiency      Past Surgical History:   Procedure Laterality Date    CARPAL TUNNEL RELEASE      CATARACT EXTRACTION W/ INTRAOCULAR LENS IMPLANT Right 3/20/2018    Procedure: CATARACT PHACO EXTRACTION WITH INTRAOCULAR LENS IMPLANT;  Surgeon: Mateus Palencia MD;   Location: [facility] OR;  Service: Ophthalmology    CATARACT EXTRACTION W/ INTRAOCULAR LENS IMPLANT Left 3/27/2018    Procedure: CATARACT PHACO EXTRACTION WITH INTRAOCULAR LENS IMPLANT;  Surgeon: Mateus Palencia MD;  Location: [facility] OR;  Service: Ophthalmology    COLONOSCOPY  06/10/2015    COLONOSCOPY N/A 2018    Procedure: COLONOSCOPY--bx;  Surgeon: gR Cr MD;  Location: [facility] ENDOSCOPY;  Service:     COLONOSCOPY N/A 3/24/2021    Procedure: COLONOSCOPY;  Surgeon: Rg Cr MD;  Location: [facility] ENDOSCOPY;  Service: Gastroenterology;  Laterality: N/A;    COLONOSCOPY W/ POLYPECTOMY  06/10/2015    Colonoscopy remove polyps 43726 (1)       ENDOSCOPY  06/10/2015    EGD w/ biopsy 77164 (1)       ENDOSCOPY N/A 2018    Procedure: ESOPHAGOGASTRODUODENOSCOPY--urgent, bx;  Surgeon: Rg Cr MD;  Location: [facility] ENDOSCOPY;  Service:     ENDOSCOPY N/A 3/24/2021    Procedure: ESOPHAGOGASTRODUODENOSCOPY;  Surgeon: Rg Cr MD;  Location: [facility] ENDOSCOPY;  Service: Gastroenterology;  Laterality: N/A;    INJECTION OF MEDICATION  2014    Drain/Inject Intermed Joint  (1)       TUBAL ABDOMINAL LIGATION      Tubal ligation (1)       UPPER GASTROINTESTINAL ENDOSCOPY  2018     Family History   Problem Relation Age of Onset    Heart disease Other     Bone cancer Other     Lung cancer Other     Throat cancer Other     Cancer Other         Other    Colon cancer Other         Colorectal Cancer    Diabetes Other     Hypertension Other      OB History    No obstetric history on file.       No Known Allergies  Social History     Socioeconomic History    Marital status: Single   Tobacco Use    Smoking status: Former     Types: Cigarettes     Quit date:      Years since quittin.6    Smokeless tobacco: Never    Tobacco comments:     non smoker for years   Vaping Use    Vaping Use: Never used   Substance and Sexual Activity    Alcohol use: No    Drug use: No    Sexual  "activity: Defer     Current Medications:  Prior to Admission medications    Medication Sig Start Date End Date Taking? Authorizing Provider   albuterol sulfate  (90 Base) MCG/ACT inhaler Inhale 2 puffs Every 6 (Six) Hours As Needed for Wheezing or Shortness of Air. 7/27/23  Yes Charleen Saavedra APRN   amitriptyline (ELAVIL) 50 MG tablet Take 1 tablet by mouth every night at bedtime. 2/13/18  Yes Irma Coker MD   atorvastatin (LIPITOR) 20 MG tablet Take 1 tablet by mouth Daily. Take 1 tablet by mouth every evening   Yes Irma Coker MD B-VIRAL UF III MINI PEN NEEDLES 31G X 5 MM misc  7/11/23  Yes Irma Coker MD   Cholecalciferol (Vitamin D3) 1.25 MG (09634 UT) tablet Take 1 tablet by mouth 1 (One) Time Per Week.   Yes Irma Coker MD   clopidogrel (PLAVIX) 75 MG tablet Take 1 tablet by mouth Daily. 3/8/21  Yes Irma Coker MD   furosemide (LASIX) 40 MG tablet Take 1 tablet by mouth Daily.   Yes Irma Coker MD   glucose blood test strip 1 each by Other route As Needed. Use as instructed   Yes Irma Coker MD   HYDROcodone-acetaminophen (NORCO) 7.5-325 MG per tablet TAKE 1 TABLET FOUR TIMES DAILY AS NEEDED FOR PAIN (EFFECTIVE 3/16/21) 3/16/21  Yes Irma Coker MD   Insulin Glargine (Lantus SoloStar) 100 UNIT/ML injection pen Inject 80 Units under the skin into the appropriate area as directed Every Night.   Yes Irma Coker MD   Insulin Lispro, 1 Unit Dial, (HUMALOG) 100 UNIT/ML solution pen-injector INJECT 5 UNITS SUBQ THREE TIMES DAILY BEFORE MEALS 4/11/22  Yes Irma Coker MD   Insulin Syringe-Needle U-100 31G X 5/16\" 0.3 ML misc    Yes Irma Coker MD   isosorbide mononitrate (IMDUR) 30 MG 24 hr tablet Take 1 tablet by mouth Daily. 3/8/21  Yes Irma Coker MD   Lancets (ACCU-CHEK MULTICLIX) lancets Use to test three times a day 6/16/15  Yes Irma Coker MD   lisinopril (PRINIVIL,ZESTRIL) 10 MG " "tablet Take 1 tablet by mouth Daily. 5/20/22  Yes Irma Coker MD   magnesium oxide (MAGOX) 400 (241.3 Mg) MG tablet tablet Take 1 tablet by mouth 1 (One) Time. 12/28/20  Yes Irma Coker MD   metoprolol tartrate (LOPRESSOR) 25 MG tablet Take 0.5 tablets by mouth Daily. 5/20/22  Yes Irma Coker MD   Semaglutide,0.25 or 0.5MG/DOS, (Ozempic, 0.25 or 0.5 MG/DOSE,) 2 MG/1.5ML solution pen-injector INJECT 0.5MG SUBCUTANEOUSLY EVERY WEEK 5/18/22  Yes Irma Coker MD   sertraline (ZOLOFT) 50 MG tablet Take 2 tablets by mouth Daily. Take 1 tablet by mouth nightly 5/18/22  Yes Irma Coker MD   vitamin D (ERGOCALCIFEROL) 04226 UNITS capsule capsule Take 1 capsule by mouth 1 (One) Time Per Week.   Yes Irma Coker MD   diclofenac (VOLTAREN) 75 MG EC tablet  7/21/23   ProviderIrma MD     Review of Systems  Review of Systems   Constitutional:  Negative for unexpected weight change.   HENT:  Positive for trouble swallowing.    Respiratory:  Positive for cough.    Gastrointestinal:  Positive for abdominal pain.   Hematological:  Positive for adenopathy.        Objective    /89   Pulse 69   Ht 160 cm (62.99\")   Wt 129 kg (283 lb 6.4 oz)   BMI 50.22 kg/m²   Physical Exam  Vitals and nursing note reviewed.   Constitutional:       General: She is not in acute distress.     Appearance: She is well-developed. She is obese. She is ill-appearing.   HENT:      Head: Normocephalic and atraumatic.   Eyes:      Pupils: Pupils are equal, round, and reactive to light.   Cardiovascular:      Rate and Rhythm: Normal rate and regular rhythm.      Heart sounds: Normal heart sounds.   Pulmonary:      Effort: Pulmonary effort is normal.      Breath sounds: Normal breath sounds.   Abdominal:      General: Bowel sounds are normal. There is no distension or abdominal bruit.      Palpations: Abdomen is soft. Abdomen is not rigid. There is no shifting dullness or mass.      " Tenderness: There is abdominal tenderness. There is no guarding or rebound.      Hernia: No hernia is present. There is no hernia in the ventral area.   Musculoskeletal:         General: Normal range of motion.      Cervical back: Normal range of motion.   Skin:     General: Skin is warm and dry.   Neurological:      Mental Status: She is alert and oriented to person, place, and time.   Psychiatric:         Behavior: Behavior normal.         Thought Content: Thought content normal.         Judgment: Judgment normal.     Assessment & Plan      1. Dysphagia, unspecified type    2. Epigastric pain    3. Gastroesophageal reflux disease, unspecified whether esophagitis present    4. Mild anemia    .   Diagnoses and all orders for this visit:    1. Dysphagia, unspecified type (Primary)  -     Case Request; Standing  -     dextrose 5 % and sodium chloride 0.45 % infusion  -     Case Request    2. Epigastric pain  -     Case Request; Standing  -     dextrose 5 % and sodium chloride 0.45 % infusion  -     Case Request    3. Gastroesophageal reflux disease, unspecified whether esophagitis present  -     Case Request; Standing  -     dextrose 5 % and sodium chloride 0.45 % infusion  -     Case Request    4. Mild anemia  -     Case Request; Standing  -     dextrose 5 % and sodium chloride 0.45 % infusion  -     Case Request    Other orders  -     Obtain Informed Consent; Standing  -     Follow Anesthesia Guidelines / Protocol; Future  -     Obtain Informed Consent; Future  -     POC Glucose Once; Standing        Orders placed during this encounter include:  Orders Placed This Encounter   Procedures    Obtain Informed Consent     Standing Status:   Future     Order Specific Question:   Informed Consent Given For     Answer:   ESOPHAGOGASTRODUODENOSCOPY WITH DILATATION       Medications prescribed:  No orders of the defined types were placed in this encounter.      Requested Prescriptions      No prescriptions requested or  ordered in this encounter       Review and/or summary of lab tests, radiology, procedures, medications. Review and summary of old records and obtaining of history. The risks and benefits of my recommendations, as well as other treatment options were discussed with the patient today. Questions were answered.    Follow-up: Return in about 5 weeks (around 9/18/2023), or if symptoms worsen or fail to improve.     ESOPHAGOGASTRODUODENOSCOPY WITH DILATATION (N/A)      This document has been electronically signed by Jarett Mcdonough PA-C on August 28, 2023 19:23 CDT      Results for orders placed or performed in visit on 07/05/23   CBC Auto Differential    Specimen: Blood   Result Value Ref Range    WBC 7.58 3.40 - 10.80 10*3/mm3    RBC 4.12 3.77 - 5.28 10*6/mm3    Hemoglobin 11.5 (L) 12.0 - 15.9 g/dL    Hematocrit 34.1 34.0 - 46.6 %    MCV 82.8 79.0 - 97.0 fL    MCH 27.9 26.6 - 33.0 pg    MCHC 33.7 31.5 - 35.7 g/dL    RDW 13.8 12.3 - 15.4 %    RDW-SD 40.9 37.0 - 54.0 fl    MPV 11.4 6.0 - 12.0 fL    Platelets 226 140 - 450 10*3/mm3    Neutrophil % 54.2 42.7 - 76.0 %    Lymphocyte % 34.7 19.6 - 45.3 %    Monocyte % 7.4 5.0 - 12.0 %    Eosinophil % 2.8 0.3 - 6.2 %    Basophil % 0.5 0.0 - 1.5 %    Immature Grans % 0.4 0.0 - 0.5 %    Neutrophils, Absolute 4.11 1.70 - 7.00 10*3/mm3    Lymphocytes, Absolute 2.63 0.70 - 3.10 10*3/mm3    Monocytes, Absolute 0.56 0.10 - 0.90 10*3/mm3    Eosinophils, Absolute 0.21 0.00 - 0.40 10*3/mm3    Basophils, Absolute 0.04 0.00 - 0.20 10*3/mm3    Immature Grans, Absolute 0.03 0.00 - 0.05 10*3/mm3    nRBC 0.0 0.0 - 0.2 /100 WBC   Microalbumin / Creatinine Urine Ratio - Urine, Clean Catch    Specimen: Urine, Clean Catch   Result Value Ref Range    Microalbumin/Creatinine Ratio      Creatinine, Urine 80.3 mg/dL    Microalbumin, Urine <1.2 mg/dL   Vitamin D,25-Hydroxy    Specimen: Blood   Result Value Ref Range    25 Hydroxy, Vitamin D 71.5 30.0 - 100.0 ng/ml   TSH    Specimen: Blood   Result  Value Ref Range    TSH 2.540 0.270 - 4.200 uIU/mL   Hemoglobin A1c    Specimen: Blood   Result Value Ref Range    Hemoglobin A1C 6.20 (H) 4.80 - 5.60 %   Vitamin B12    Specimen: Blood   Result Value Ref Range    Vitamin B-12 223 211 - 946 pg/mL   Lipid Panel    Specimen: Blood   Result Value Ref Range    Total Cholesterol 189 0 - 200 mg/dL    Triglycerides 105 0 - 150 mg/dL    HDL Cholesterol 47 40 - 60 mg/dL    LDL Cholesterol  123 (H) 0 - 100 mg/dL    VLDL Cholesterol 19 5 - 40 mg/dL    LDL/HDL Ratio 2.57    Comprehensive Metabolic Panel    Specimen: Blood   Result Value Ref Range    Glucose 59 (L) 65 - 99 mg/dL    BUN 27 (H) 8 - 23 mg/dL    Creatinine 1.43 (H) 0.57 - 1.00 mg/dL    Sodium 142 136 - 145 mmol/L    Potassium 4.7 3.5 - 5.2 mmol/L    Chloride 105 98 - 107 mmol/L    CO2 26.5 22.0 - 29.0 mmol/L    Calcium 9.9 8.6 - 10.5 mg/dL    Total Protein 7.1 6.0 - 8.5 g/dL    Albumin 4.5 3.5 - 5.2 g/dL    ALT (SGPT) 14 1 - 33 U/L    AST (SGOT) 15 1 - 32 U/L    Alkaline Phosphatase 59 39 - 117 U/L    Total Bilirubin 0.3 0.0 - 1.2 mg/dL    Globulin 2.6 gm/dL    A/G Ratio 1.7 g/dL    BUN/Creatinine Ratio 18.9 7.0 - 25.0    Anion Gap 10.5 5.0 - 15.0 mmol/L    eGFR 39.5 (L) >60.0 mL/min/1.73   Results for orders placed or performed during the hospital encounter of 03/24/21   Tissue Pathology Exam    Specimen: A: Gastric, Antrum; Tissue    B: Esophagus, Distal; Tissue    C: Large Intestine, Sigmoid Colon; Tissue   Result Value Ref Range    Case Report       Surgical Pathology Report                         Case: [id number]                                  Authorizing Provider:  Rg Cr MD        Collected:           03/24/2021 10:26 AM          [facility]             Received:            03/24/2021 01:38 PM                                 [place] [facility]                                                     Pathologist:           Ranjan Saunders MD                                                            Specimens:   1) - Gastric, Antrum                                                                                2) - Esophagus, Distal                                                                              3) - Large Intestine, Sigmoid Colon, polyps                                                Final Diagnosis       SEE SCANNED REPORT       POC Glucose Once    Specimen: Blood   Result Value Ref Range    Glucose 195 (H) 70 - 130 mg/dL   Results for orders placed or performed in visit on 03/21/21   COVID-19,[facility] IN-HOUSE, NP/OP SWAB IN UTM/VTM/SALINE TRANSPORT MEDIA,24 HR TAT - Swab, Nasopharynx    Specimen: Nasopharynx; Swab   Result Value Ref Range    COVID19 Not Detected Not Detected - Ref. Range   Results for orders placed or performed during the hospital encounter of 03/27/18   POC Glucose Once    Specimen: Blood   Result Value Ref Range    Glucose 268 (H) 70 - 130 mg/dL   Results for orders placed or performed during the hospital encounter of 03/20/18   POC Glucose Once    Specimen: Blood   Result Value Ref Range    Glucose 201 (H) 70 - 130 mg/dL   Results for orders placed or performed during the hospital encounter of 01/26/18   Tissue Pathology Exam - Tissue, Gastric, Antrum    Specimen: A: Gastric, Antrum; Tissue    B: Small Intestine, Duodenum; Tissue    C: Large Intestine; Tissue    D: Large Intestine, Sigmoid Colon; Tissue   Result Value Ref Range    Case Report       Surgical Pathology Report                         Case: [id number]                                  Authorizing Provider:  Rg Cr MD         Collected:           01/26/2018 03:03 PM          Ordering Location:     [facility]             Received:            01/27/2018 09:59 AM                                 [place] [facility]                                                     Pathologist:           Elier Chris MD                                                           Specimens:   1) - Gastric, Antrum, antrum                                                                        2) - Small Intestine, Duodenum, small bowel                                                         3) - Large Intestine, colonic mucosa                                                                4) - Large Intestine, Sigmoid Colon, sigmoid poly cold snare                               Final Diagnosis       1.  MUCOSA, ANTRUM OF STOMACH:  ACTIVE CHRONIC GASTRITIS.  POSITIVE FOR HELICOBACTER PYLORI (HP IMMUNOSTAIN).    2.  MUCOSA, DUODENUM:  NO SIGNIFICANT HISTOLOGIC ABNORMALITY.    3.  MUCOSA, COLON:  NO SIGNIFICANT HISTOLOGIC ABNORMALITY.    4.  POLYP, SIGMOID COLON:  HYPERPLASTIC POLYP.      Comment       Helicobacter pylori (HP) immunostain is performed because an appropriate inflammatory milieu is present and organisms are not seen on H & E stained slides.    HP immunostain was developed and its performance characteristics determined by [facility].  It has not been cleared or approved by the U.S. Food and Drug Administration.  The FDA has determined that such clearance or approval is not necessary.  This test is used for clinical purposes.  It should not be regarded as investigational or for research.  This laboratory is certified under the Clinical Laboratory Improvement Amendments of 1988 (CLIA-88) as qualified to perform high complexity clinical laboratory testing.          Gross Description       Received for examination are 4 containers, each of which have nodular bits of white soft tissue measuring 0.3-0.5 cc in aggregate.  All specimens are embedded as labeled.  1A antrum of stomach; 2A duodenum; 3A mucosa of colon; 4A polyp, sigmoid colon (cold snare).      Embedded Images     POC Glucose Once    Specimen: Blood   Result Value Ref Range    Glucose 179 (H) 70 - 130 mg/dL   Results for orders placed or performed in visit on 10/31/17   [facility] (MW[facility] - Urine, Clean Catch    Specimen: Urine, Clean Catch   Result Value Ref Range    Report Summary FINAL    Results for orders placed or performed in visit on 09/11/17   Hepatitis panel, acute    Specimen: Blood   Result Value Ref Range    Hepatitis C Ab Negative Negative    Hep A IgM Negative Negative    Hep B C IgM Negative Negative    Hepatitis B Surface Ag Negative Negative   Results for orders placed or performed in visit on 03/07/17   ToxAS[employer] Select 13 (MW)    Specimen: Urine, Clean Catch   Result Value Ref Range    Report Summary FINAL     PDF Image .    Results for orders placed or performed during the hospital encounter of 02/08/17   Gold Top - SST   Result Value Ref Range    Extra Tube Hold for add-ons.    Green Top (Gel)   Result Value Ref Range    Extra Tube Hold for add-ons.    CK-MB    Specimen: Blood   Result Value Ref Range    CKMB 0.68 0.00 - 5.00 ng/mL   CBC Auto Differential    Specimen: Blood   Result Value Ref Range    WBC 4.83 3.20 - 9.80 10*3/mm3    RBC 4.41 3.77 - 5.16 10*6/mm3    Hemoglobin 12.1 12.0 - 15.5 g/dL    Hematocrit 36.7 35.0 - 45.0 %    MCV 83.2 80.0 - 98.0 fL    MCH 27.4 26.5 - 34.0 pg    MCHC 33.0 31.4 - 36.0 g/dL    RDW 13.3 11.5 - 14.5 %    RDW-SD 40.0 36.4 - 46.3 fl    MPV 10.8 8.0 - 12.0 fL    Platelets 126 (L) 150 - 450 10*3/mm3    Neutrophil % 49.3 37.0 - 80.0 %    Lymphocyte % 42.2 10.0 - 50.0 %    Monocyte % 5.8 0.0 - 12.0 %    Eosinophil % 2.1 0.0 - 7.0 %    Basophil % 0.4 0.0 - 2.0 %    Immature Grans % 0.2 0.0 - 0.5 %    Neutrophils, Absolute 2.38 2.00 - 8.60 10*3/mm3    Lymphocytes, Absolute 2.04 0.60 - 4.20 10*3/mm3    Monocytes, Absolute 0.28 0.00 - 0.90 10*3/mm3    Eosinophils, Absolute 0.10 0.00 - 0.70 10*3/mm3    Basophils, Absolute 0.02 0.00 - 0.20 10*3/mm3    Immature Grans, Absolute 0.01 0.00 - 0.02 10*3/mm3     *Note: Due to a large number of results and/or encounters for the requested time period, some results have not been displayed. A complete set  of results can be found in Results Review.

## 2023-09-21 LAB — REF LAB TEST METHOD: NORMAL

## 2023-09-27 ENCOUNTER — OFFICE VISIT (OUTPATIENT)
Dept: GASTROENTEROLOGY | Facility: CLINIC | Age: 68
End: 2023-09-27
Payer: MEDICARE

## 2023-09-27 VITALS
DIASTOLIC BLOOD PRESSURE: 75 MMHG | SYSTOLIC BLOOD PRESSURE: 129 MMHG | HEART RATE: 64 BPM | BODY MASS INDEX: 48.73 KG/M2 | WEIGHT: 275 LBS | HEIGHT: 63 IN

## 2023-09-27 DIAGNOSIS — K22.2 PEPTIC STRICTURE OF ESOPHAGUS: ICD-10-CM

## 2023-09-27 DIAGNOSIS — B96.81 HELICOBACTER PYLORI GASTRITIS: ICD-10-CM

## 2023-09-27 DIAGNOSIS — R13.10 DYSPHAGIA, UNSPECIFIED TYPE: Primary | ICD-10-CM

## 2023-09-27 DIAGNOSIS — K29.70 HELICOBACTER PYLORI GASTRITIS: ICD-10-CM

## 2023-09-27 PROCEDURE — 99213 OFFICE O/P EST LOW 20 MIN: CPT | Performed by: PHYSICIAN ASSISTANT

## 2023-09-27 PROCEDURE — 3078F DIAST BP <80 MM HG: CPT | Performed by: PHYSICIAN ASSISTANT

## 2023-09-27 PROCEDURE — 1160F RVW MEDS BY RX/DR IN RCRD: CPT | Performed by: PHYSICIAN ASSISTANT

## 2023-09-27 PROCEDURE — 3074F SYST BP LT 130 MM HG: CPT | Performed by: PHYSICIAN ASSISTANT

## 2023-09-27 PROCEDURE — 1159F MED LIST DOCD IN RCRD: CPT | Performed by: PHYSICIAN ASSISTANT

## 2023-09-27 RX ORDER — METRONIDAZOLE 500 MG/1
500 TABLET ORAL 2 TIMES DAILY
Qty: 28 TABLET | Refills: 0 | Status: SHIPPED | OUTPATIENT
Start: 2023-09-27

## 2023-09-27 RX ORDER — CLARITHROMYCIN 500 MG/1
500 TABLET, COATED ORAL 2 TIMES DAILY
Qty: 28 TABLET | Refills: 0 | Status: SHIPPED | OUTPATIENT
Start: 2023-09-27

## 2023-09-27 RX ORDER — OMEPRAZOLE 20 MG/1
20 CAPSULE, DELAYED RELEASE ORAL 2 TIMES DAILY
Qty: 30 CAPSULE | Refills: 0 | Status: SHIPPED | OUTPATIENT
Start: 2023-09-27

## 2023-09-27 NOTE — PROGRESS NOTES
Chief Complaint   Patient presents with    Difficulty Swallowing    Heartburn    Abdominal Pain       ENDO PROCEDURE ORDERED:    Subjective    Mila Fisher is a 68 y.o. female. she is here today for follow-up.    History of Present Illness    Patient seen on a recheck of her GERD, abdominal pain, dysphagia. Last seen 08/14/2023. Patient underwent EGD on 09/20/2023, showed esophageal stricture dilated to a 54-French with a Savary dilator, also showed grade 3 esophagitis, gastritis. Distal esophageal biopsy showed pathological abnormality, negative for Torres's. Antral biopsy showed chronic active H. Pylori gastritis. Patient had a prior H. Pylori with multiple polyps on EGD/colonoscopy 03/24/2021. She has a lot of swelling and burning in her abdomen. She has had a lot of heartburn, but states her dysphagia is better post dilatation. Bowels are moving without blood or mucus. Weight is down 7.5 pounds since last visit.     A/P: Patient with peptic stricture, dysphagia improved post dilatation, recommend repeat dilatation as needed. Biopsies were negative for Torres's. She does have positive H. Pylori. I have recommended treating her with Prilosec, erythromycin, Flagyl. Would consider retesting to confirm that she clears it, but if her symptoms ate improved, will determine at her follow-up in 1 month. Will likely need to keep her on Prilosec for another antireflux measure. She was encouraged to continue dietary modification and weight loss. Repeat dilatation as needed. Further pending clinical course and results of the above.          The following portions of the patient's history were reviewed and updated as appropriate:   Past Medical History:   Diagnosis Date    Acute bronchitis     Anxiety     Anxiety state       Arthropathy of lumbar facet joint     Asthma     Astigmatism     At risk for adverse drug event     Adverse drug event resulting from treatment of disorder       Back ache     Benign essential  hypertension     CHF (congestive heart failure)     Cholecystitis     mild on ultrasound       Chronic back pain     Contact dermatitis     COPD (chronic obstructive pulmonary disease)     Cough     Depression     Depressive disorder, not elsewhere classified       Diabetes mellitus     no retinopathy       Diabetes mellitus without complication     type II or unspecified type, not stated as uncontrolled       Diarrhea     Drug therapy     Other long term (current) drug therapy       Dyspnea     Electrocardiogram abnormal     Epigastric pain     Episodic mood disorder     Unspecified     Esophageal reflux     Esophagitis     grade II    Foot pain     VS SMALL PHALANX AVULSION       Generalized abdominal pain     Generalized anxiety disorder     History of colon polyps     Hypermetropia     Irritable bowel syndrome     Knee pain 03/22/2016     being evaluated for knee replacements.        Malaise and fatigue     Myofascial pain     Nausea and vomiting     Neck pain     Onychomycosis     LJ (obstructive sleep apnea)     Osteoarthritis     Pain in wrist     Polyp of intestine     large intestine    Pure hypercholesterolemia     Right upper quadrant pain     Screening for hyperlipidemia     Shoulder pain 09/02/2014    possible Rotator Cuff Tendinitis       Spasm of back muscles     Sprain of sacroiliac ligament     Stroke     Transient cerebral ischemia     Type 2 diabetes mellitus     Type II diabetes mellitus, uncontrolled     Upper respiratory infection     Vitamin D deficiency      Past Surgical History:   Procedure Laterality Date    CARPAL TUNNEL RELEASE      CATARACT EXTRACTION W/ INTRAOCULAR LENS IMPLANT Right 3/20/2018    Procedure: CATARACT PHACO EXTRACTION WITH INTRAOCULAR LENS IMPLANT;  Surgeon: Mateus Palencia MD;  Location: [facility];  Service: Ophthalmology    CATARACT EXTRACTION W/ INTRAOCULAR LENS IMPLANT Left 3/27/2018    Procedure: CATARACT PHACO EXTRACTION WITH INTRAOCULAR LENS IMPLANT;   Surgeon: Mateus Palencia MD;  Location: [facility] OR;  Service: Ophthalmology    COLONOSCOPY  06/10/2015    COLONOSCOPY N/A 2018    Procedure: COLONOSCOPY--bx;  Surgeon: Rg Cr MD;  Location: [facility] ENDOSCOPY;  Service:     COLONOSCOPY N/A 3/24/2021    Procedure: COLONOSCOPY;  Surgeon: Rg Cr MD;  Location: [facility] ENDOSCOPY;  Service: Gastroenterology;  Laterality: N/A;    COLONOSCOPY W/ POLYPECTOMY  06/10/2015    Colonoscopy remove polyps 43877 (1)       ENDOSCOPY  06/10/2015    EGD w/ biopsy 66675 (1)       ENDOSCOPY N/A 2018    Procedure: ESOPHAGOGASTRODUODENOSCOPY--urgent, bx;  Surgeon: Rg Cr MD;  Location: [facility] ENDOSCOPY;  Service:     ENDOSCOPY N/A 3/24/2021    Procedure: ESOPHAGOGASTRODUODENOSCOPY;  Surgeon: Rg Cr MD;  Location: [facility] ENDOSCOPY;  Service: Gastroenterology;  Laterality: N/A;    ENDOSCOPY N/A 2023    Procedure: ESOPHAGOGASTRODUODENOSCOPY WITH DILATATION;  Surgeon: Rg Cr MD;  Location: [facility] ENDOSCOPY;  Service: Gastroenterology;  Laterality: N/A;    INJECTION OF MEDICATION  2014    Drain/Inject Intermed Joint  (1)       TUBAL ABDOMINAL LIGATION      Tubal ligation (1)       UPPER GASTROINTESTINAL ENDOSCOPY  2018     Family History   Problem Relation Age of Onset    Heart disease Other     Bone cancer Other     Lung cancer Other     Throat cancer Other     Cancer Other         Other    Colon cancer Other         Colorectal Cancer    Diabetes Other     Hypertension Other      OB History    No obstetric history on file.       No Known Allergies  Social History     Socioeconomic History    Marital status: Single   Tobacco Use    Smoking status: Former     Types: Cigarettes     Quit date:      Years since quittin.7    Smokeless tobacco: Never    Tobacco comments:     non smoker for years   Vaping Use    Vaping Use: Never used   Substance and Sexual Activity    Alcohol use: No    Drug use: No    Sexual  "activity: Defer     Current Medications:  Prior to Admission medications    Medication Sig Start Date End Date Taking? Authorizing Provider   albuterol sulfate  (90 Base) MCG/ACT inhaler Inhale 2 puffs Every 6 (Six) Hours As Needed for Wheezing or Shortness of Air. 7/27/23  Yes Charleen Saavedra APRN   amitriptyline (ELAVIL) 50 MG tablet TAKE 1 TABLET BY MOUTH EVERY EVENING  Patient taking differently: Take 1 tablet by mouth Every Night. 9/6/23  Yes Charleen Saavedra APRN   atorvastatin (LIPITOR) 20 MG tablet Take 1 tablet by mouth Every Night. Take 1 tablet by mouth every evening   Yes Irma Coker MD B-D UF III MINI PEN NEEDLES 31G X 5 MM misc  7/11/23  Yes Irma Coker MD   Cholecalciferol (Vitamin D3) 1.25 MG (77287 UT) capsule TAKE 1 CAPSULE BY MOUTH ONCE WEEKLY  Patient taking differently: Take 1 capsule by mouth Every 7 (Seven) Days. 9/6/23  Yes Charleen Saavedra APRN   clopidogrel (PLAVIX) 75 MG tablet Take 1 tablet by mouth Daily. \"NOT TAKING AT THIS TIME DUE TO PROCEDURE ON 9-20-23\" 3/8/21  Yes Irma Coker MD   diclofenac (VOLTAREN) 75 MG EC tablet  7/21/23  Yes Irma Coker MD   furosemide (LASIX) 40 MG tablet TAKE 1 TABLET BY MOUTH DAILY 9/6/23  Yes Charleen Saavedra APRN   glucose blood test strip 1 each by Other route As Needed. Use as instructed   Yes Irma Coker MD   HYDROcodone-acetaminophen (NORCO) 7.5-325 MG per tablet Take 1 tablet by mouth 4 (Four) Times a Day As Needed. 3/16/21  Yes Irma Coker MD   Insulin Glargine (Lantus SoloStar) 100 UNIT/ML injection pen Inject 80 Units under the skin into the appropriate area as directed Every Night.   Yes Irma Coker MD   Insulin Lispro, 1 Unit Dial, (HUMALOG) 100 UNIT/ML solution pen-injector Inject 5 Units under the skin into the appropriate area as directed 4 (Four) Times a Day Before Meals & at Bedtime As Needed. 4/11/22  Yes Irma Coker MD   Insulin Syringe-Needle U-100 31G X " "5/16\" 0.3 ML misc    Yes Irma Coker MD   isosorbide mononitrate (IMDUR) 30 MG 24 hr tablet Take 1 tablet by mouth Daily. 3/8/21  Yes Irma Coker MD   Lancets (ACCU-CHEK MULTICLIX) lancets Use to test three times a day 6/16/15  Yes Irma Coker MD   lisinopril (PRINIVIL,ZESTRIL) 10 MG tablet Take 1 tablet by mouth Daily. 5/20/22  Yes Irma Coker MD   magnesium oxide (MAGOX) 400 (241.3 Mg) MG tablet tablet Take 1 tablet by mouth 1 (One) Time. 12/28/20  Yes Irma Coker MD   metoprolol tartrate (LOPRESSOR) 25 MG tablet Take 0.5 tablets by mouth Daily. 5/20/22  Yes Irma Coker MD   Semaglutide,0.25 or 0.5MG/DOS, (Ozempic, 0.25 or 0.5 MG/DOSE,) 2 MG/1.5ML solution pen-injector 1 (One) Time Per Week. \"NOT HAD IN 10-15 DAYS DUE TO PROCEDURE ON 9-20-23) 5/18/22  Yes Irma Coker MD   sertraline (ZOLOFT) 50 MG tablet Take 2 tablets by mouth Daily. Take 1 tablet by mouth nightly 5/18/22  Yes Irma Coker MD   clarithromycin (BIAXIN) 500 MG tablet Take 1 tablet by mouth 2 (Two) Times a Day. Hold lipitor while taking. 9/27/23   Jarett Mcdonough PA-C   metroNIDAZOLE (FLAGYL) 500 MG tablet Take 1 tablet by mouth 2 (Two) Times a Day. 9/27/23   Jarett Mcdonough PA-C   omeprazole (priLOSEC) 20 MG capsule Take 1 capsule by mouth 2 (Two) Times a Day. 9/27/23   Jarett Mcdonough PA-C     Review of Systems  Review of Systems       Objective    /75   Pulse 64   Ht 160 cm (63\")   Wt 125 kg (275 lb)   BMI 48.71 kg/m²   Physical Exam  Vitals and nursing note reviewed.   Constitutional:       General: She is not in acute distress.     Appearance: She is well-developed. She is obese. She is ill-appearing.   HENT:      Head: Normocephalic and atraumatic.   Eyes:      Pupils: Pupils are equal, round, and reactive to light.   Cardiovascular:      Rate and Rhythm: Normal rate and regular rhythm.      Heart sounds: Normal heart sounds.   Pulmonary:      " Effort: Pulmonary effort is normal.      Breath sounds: Normal breath sounds.   Abdominal:      General: Bowel sounds are normal. There is no distension or abdominal bruit.      Palpations: Abdomen is soft. Abdomen is not rigid. There is no shifting dullness or mass.      Tenderness: There is abdominal tenderness. There is no guarding or rebound.      Hernia: No hernia is present. There is no hernia in the ventral area.   Musculoskeletal:         General: Normal range of motion.      Cervical back: Normal range of motion.   Skin:     General: Skin is warm and dry.   Neurological:      Mental Status: She is alert and oriented to person, place, and time.   Psychiatric:         Behavior: Behavior normal.         Thought Content: Thought content normal.         Judgment: Judgment normal.     Assessment & Plan      1. Dysphagia, unspecified type    2. Peptic stricture of esophagus    3. Helicobacter pylori gastritis    .   Diagnoses and all orders for this visit:    1. Dysphagia, unspecified type (Primary)    2. Peptic stricture of esophagus    3. Helicobacter pylori gastritis    Other orders  -     omeprazole (priLOSEC) 20 MG capsule; Take 1 capsule by mouth 2 (Two) Times a Day.  Dispense: 30 capsule; Refill: 0  -     clarithromycin (BIAXIN) 500 MG tablet; Take 1 tablet by mouth 2 (Two) Times a Day. Hold lipitor while taking.  Dispense: 28 tablet; Refill: 0  -     metroNIDAZOLE (FLAGYL) 500 MG tablet; Take 1 tablet by mouth 2 (Two) Times a Day.  Dispense: 28 tablet; Refill: 0        Orders placed during this encounter include:  No orders of the defined types were placed in this encounter.      Medications prescribed:  New Medications Ordered This Visit   Medications    omeprazole (priLOSEC) 20 MG capsule     Sig: Take 1 capsule by mouth 2 (Two) Times a Day.     Dispense:  30 capsule     Refill:  0    clarithromycin (BIAXIN) 500 MG tablet     Sig: Take 1 tablet by mouth 2 (Two) Times a Day. Hold lipitor while taking.      Dispense:  28 tablet     Refill:  0    metroNIDAZOLE (FLAGYL) 500 MG tablet     Sig: Take 1 tablet by mouth 2 (Two) Times a Day.     Dispense:  28 tablet     Refill:  0       Requested Prescriptions     Signed Prescriptions Disp Refills    omeprazole (priLOSEC) 20 MG capsule 30 capsule 0     Sig: Take 1 capsule by mouth 2 (Two) Times a Day.    clarithromycin (BIAXIN) 500 MG tablet 28 tablet 0     Sig: Take 1 tablet by mouth 2 (Two) Times a Day. Hold lipitor while taking.    metroNIDAZOLE (FLAGYL) 500 MG tablet 28 tablet 0     Sig: Take 1 tablet by mouth 2 (Two) Times a Day.       Review and/or summary of lab tests, radiology, procedures, medications. Review and summary of old records and obtaining of history. The risks and benefits of my recommendations, as well as other treatment options were discussed with the patient today. Questions were answered.    Follow-up: Return in about 1 month (around 10/27/2023), or if symptoms worsen or fail to improve.     * Surgery not found *      This document has been electronically signed by Jarett Mcdonough PA-C on 2023 20:00 CDT      Results for orders placed or performed during the hospital encounter of 23   TISSUE EXAM, [facility] ([facility])    Specimen: A: Gastric, Antrum; Tissue    B: Esophagus, Distal; Tissue   Result Value Ref Range    Reference Lab Report       [facility]  [address] [place] [address]  Phone: [phone] or [phone]  Fax: [phone]  Elier Martinez M.D., Medical Director    PATIENT NAME                                     LABORATORY NO.  MICHAEL ACKERMAN.                         [id number]  [id number]                                 AGE                    SEX   SSN              CLIENT REF #  [facility]                   68        1955      F     [id number]      [id number]    [place]                               MAITEING M.D.            "ATTENDING M.D.         COPY TO.  [address] [place] [address]                         [place] [address]                     DATE COLLECTED            DATE RECEIVED          DATE REPORTED  09/20/2023 09/20/2023 09/21/2023    DIAGNOSIS:  A.     GASTRIC ANTRUM, BIOPSY:  Chronic active Helicobacter pylori gastritis.  Negative for intestinal  metaplasia, dysplasia, or malignancy.  B.     ESOPHAGUS, BIOPSY, DISTAL:  Squamous mucosa with no pathologic abnormalities.  Negative for intestinal metaplasia, dysplasia, or malignancy.    CLINICAL HISTORY:  Dysphagia, unspecified type, epigastric pain, Gastro-esophageal reflux disease  without esophagitis, mild anemia    SPECIMENS RECEIVED:  A.    GASTRIC ANTRUM, BIOPSY  B.    ESOPHAGUS, BIOPSY, DISTAL    MICROSCOPIC DESCRIPTION:  Tissue blocks are prepared and slides are examined microscopically on all  specimens. See diagnosis for details.    Professional interpretation rendered by Yusef Luna M.D. at [facility], [address] [place] [address].    GROSS DESCRIPTION:  A.    Labeled as \"gastric antrum\", consisting of 1 piece of tan soft tissue  measuring 0.4 x 0.2 x 0.2 cm, submitted entirely in 1 cassette.  SOG  B.    Labeled as \"distal esophagus\", consisting of 1 piece of tan soft tissue  measuring 0.4 x 0.2 x 0.1 cm, submitted entirely in 1  cassette.    REVIEWED, DIAGNOSED AND ELECTRONICALLY  SIGNED BY:    Yusef Luna M.D.  CPT CODES:  88305x2     POC Glucose Once    Specimen: Blood   Result Value Ref Range    Glucose 155 (H) 70 - 130 mg/dL   Results for orders placed or performed in visit on 07/05/23   CBC Auto Differential    Specimen: Blood   Result Value Ref Range    WBC 7.58 3.40 - 10.80 10*3/mm3    RBC 4.12 3.77 - 5.28 10*6/mm3    Hemoglobin 11.5 (L) 12.0 - 15.9 g/dL    Hematocrit 34.1 34.0 - 46.6 %    MCV 82.8 79.0 - 97.0 fL    MCH 27.9 26.6 - 33.0 pg    MCHC 33.7 31.5 - 35.7 " g/dL    RDW 13.8 12.3 - 15.4 %    RDW-SD 40.9 37.0 - 54.0 fl    MPV 11.4 6.0 - 12.0 fL    Platelets 226 140 - 450 10*3/mm3    Neutrophil % 54.2 42.7 - 76.0 %    Lymphocyte % 34.7 19.6 - 45.3 %    Monocyte % 7.4 5.0 - 12.0 %    Eosinophil % 2.8 0.3 - 6.2 %    Basophil % 0.5 0.0 - 1.5 %    Immature Grans % 0.4 0.0 - 0.5 %    Neutrophils, Absolute 4.11 1.70 - 7.00 10*3/mm3    Lymphocytes, Absolute 2.63 0.70 - 3.10 10*3/mm3    Monocytes, Absolute 0.56 0.10 - 0.90 10*3/mm3    Eosinophils, Absolute 0.21 0.00 - 0.40 10*3/mm3    Basophils, Absolute 0.04 0.00 - 0.20 10*3/mm3    Immature Grans, Absolute 0.03 0.00 - 0.05 10*3/mm3    nRBC 0.0 0.0 - 0.2 /100 WBC   Microalbumin / Creatinine Urine Ratio - Urine, Clean Catch    Specimen: Urine, Clean Catch   Result Value Ref Range    Microalbumin/Creatinine Ratio      Creatinine, Urine 80.3 mg/dL    Microalbumin, Urine <1.2 mg/dL   Vitamin D,25-Hydroxy    Specimen: Blood   Result Value Ref Range    25 Hydroxy, Vitamin D 71.5 30.0 - 100.0 ng/ml   TSH    Specimen: Blood   Result Value Ref Range    TSH 2.540 0.270 - 4.200 uIU/mL   Hemoglobin A1c    Specimen: Blood   Result Value Ref Range    Hemoglobin A1C 6.20 (H) 4.80 - 5.60 %   Vitamin B12    Specimen: Blood   Result Value Ref Range    Vitamin B-12 223 211 - 946 pg/mL   Lipid Panel    Specimen: Blood   Result Value Ref Range    Total Cholesterol 189 0 - 200 mg/dL    Triglycerides 105 0 - 150 mg/dL    HDL Cholesterol 47 40 - 60 mg/dL    LDL Cholesterol  123 (H) 0 - 100 mg/dL    VLDL Cholesterol 19 5 - 40 mg/dL    LDL/HDL Ratio 2.57    Comprehensive Metabolic Panel    Specimen: Blood   Result Value Ref Range    Glucose 59 (L) 65 - 99 mg/dL    BUN 27 (H) 8 - 23 mg/dL    Creatinine 1.43 (H) 0.57 - 1.00 mg/dL    Sodium 142 136 - 145 mmol/L    Potassium 4.7 3.5 - 5.2 mmol/L    Chloride 105 98 - 107 mmol/L    CO2 26.5 22.0 - 29.0 mmol/L    Calcium 9.9 8.6 - 10.5 mg/dL    Total Protein 7.1 6.0 - 8.5 g/dL    Albumin 4.5 3.5 - 5.2 g/dL     ALT (SGPT) 14 1 - 33 U/L    AST (SGOT) 15 1 - 32 U/L    Alkaline Phosphatase 59 39 - 117 U/L    Total Bilirubin 0.3 0.0 - 1.2 mg/dL    Globulin 2.6 gm/dL    A/G Ratio 1.7 g/dL    BUN/Creatinine Ratio 18.9 7.0 - 25.0    Anion Gap 10.5 5.0 - 15.0 mmol/L    eGFR 39.5 (L) >60.0 mL/min/1.73   Results for orders placed or performed during the hospital encounter of 03/24/21   Tissue Pathology Exam    Specimen: A: Gastric, Antrum; Tissue    B: Esophagus, Distal; Tissue    C: Large Intestine, Sigmoid Colon; Tissue   Result Value Ref Range    Case Report       Surgical Pathology Report                         Case: [id number]                                  Authorizing Provider:  Rg Cr MD        Collected:           03/24/2021 10:26 AM          Ordering Location:     [facility]             Received:            03/24/2021 01:38 PM                                 [place] [facility]                                                     Pathologist:           Ranjan Saunders MD                                                           Specimens:   1) - Gastric, Antrum                                                                                2) - Esophagus, Distal                                                                              3) - Large Intestine, Sigmoid Colon, polyps                                                Final Diagnosis       SEE SCANNED REPORT       POC Glucose Once    Specimen: Blood   Result Value Ref Range    Glucose 195 (H) 70 - 130 mg/dL   Results for orders placed or performed in visit on 03/21/21   COVID-19,APTIMA PANTHER,[facility] IN-HOUSE, NP/OP SWAB IN UTM/VTM/SALINE TRANSPORT MEDIA,24 HR TAT - Swab, Nasopharynx    Specimen: Nasopharynx; Swab   Result Value Ref Range    COVID19 Not Detected Not Detected - Ref. Range   Results for orders placed or performed during the hospital encounter of 03/27/18   POC Glucose Once    Specimen: Blood   Result Value Ref Range    Glucose 268  (H) 70 - 130 mg/dL   Results for orders placed or performed during the hospital encounter of 03/20/18   POC Glucose Once    Specimen: Blood   Result Value Ref Range    Glucose 201 (H) 70 - 130 mg/dL   Results for orders placed or performed during the hospital encounter of 01/26/18   Tissue Pathology Exam - Tissue, Gastric, Antrum    Specimen: A: Gastric, Antrum; Tissue    B: Small Intestine, Duodenum; Tissue    C: Large Intestine; Tissue    D: Large Intestine, Sigmoid Colon; Tissue   Result Value Ref Range    Case Report       Surgical Pathology Report                         Case: [id number]                                  Authorizing Provider:  Rg Cr MD         Collected:           01/26/2018 03:03 PM          Ordering Location:     [facility]             Received:            01/27/2018 09:59 AM                                 [place] [facility]                                                     Pathologist:           Elier Chris MD                                                          Specimens:   1) - Gastric, Antrum, antrum                                                                        2) - Small Intestine, Duodenum, small bowel                                                         3) - Large Intestine, colonic mucosa                                                                4) - Large Intestine, Sigmoid Colon, sigmoid poly cold snare                               Final Diagnosis       1.  MUCOSA, ANTRUM OF STOMACH:  ACTIVE CHRONIC GASTRITIS.  POSITIVE FOR HELICOBACTER PYLORI (HP IMMUNOSTAIN).    2.  MUCOSA, DUODENUM:  NO SIGNIFICANT HISTOLOGIC ABNORMALITY.    3.  MUCOSA, COLON:  NO SIGNIFICANT HISTOLOGIC ABNORMALITY.    4.  POLYP, SIGMOID COLON:  HYPERPLASTIC POLYP.      Comment       Helicobacter pylori (HP) immunostain is performed because an appropriate inflammatory milieu is present and organisms are not seen on H & E stained slides.    HP immunostain was  developed and its performance characteristics determined by [facility].  It has not been cleared or approved by the U.S. Food and Drug Administration.  The FDA has determined that such clearance or approval is not necessary.  This test is used for clinical purposes.  It should not be regarded as investigational or for research.  This laboratory is certified under the Clinical Laboratory Improvement Amendments of 1988 (CLIA-88) as qualified to perform high complexity clinical laboratory testing.          Gross Description       Received for examination are 4 containers, each of which have nodular bits of white soft tissue measuring 0.3-0.5 cc in aggregate.  All specimens are embedded as labeled.  1A antrum of stomach; 2A duodenum; 3A mucosa of colon; 4A polyp, sigmoid colon (cold snare).      Embedded Images     POC Glucose Once    Specimen: Blood   Result Value Ref Range    Glucose 179 (H) 70 - 130 mg/dL   Results for orders placed or performed in visit on 10/31/17   ToxASSURE Select 13 (MW) - Urine, Clean Catch    Specimen: Urine, Clean Catch   Result Value Ref Range    Report Summary FINAL    Results for orders placed or performed in visit on 09/11/17   Hepatitis panel, acute    Specimen: Blood   Result Value Ref Range    Hepatitis C Ab Negative Negative    Hep A IgM Negative Negative    Hep B C IgM Negative Negative    Hepatitis B Surface Ag Negative Negative   Results for orders placed or performed in visit on 03/07/17   ToxASSURE Select 13 (MW)    Specimen: Urine, Clean Catch   Result Value Ref Range    Report Summary FINAL     PDF Image .    Results for orders placed or performed during the hospital encounter of 02/08/17   Gold Top - SST   Result Value Ref Range    Extra Tube Hold for add-ons.    Green Top (Gel)   Result Value Ref Range    Extra Tube Hold for add-ons.    CK-MB    Specimen: Blood   Result Value Ref Range    CKMB 0.68 0.00 - 5.00 ng/mL   CBC Auto Differential     Specimen: Blood   Result Value Ref Range    WBC 4.83 3.20 - 9.80 10*3/mm3    RBC 4.41 3.77 - 5.16 10*6/mm3    Hemoglobin 12.1 12.0 - 15.5 g/dL    Hematocrit 36.7 35.0 - 45.0 %    MCV 83.2 80.0 - 98.0 fL    MCH 27.4 26.5 - 34.0 pg    MCHC 33.0 31.4 - 36.0 g/dL    RDW 13.3 11.5 - 14.5 %    RDW-SD 40.0 36.4 - 46.3 fl    MPV 10.8 8.0 - 12.0 fL    Platelets 126 (L) 150 - 450 10*3/mm3    Neutrophil % 49.3 37.0 - 80.0 %    Lymphocyte % 42.2 10.0 - 50.0 %    Monocyte % 5.8 0.0 - 12.0 %    Eosinophil % 2.1 0.0 - 7.0 %    Basophil % 0.4 0.0 - 2.0 %    Immature Grans % 0.2 0.0 - 0.5 %    Neutrophils, Absolute 2.38 2.00 - 8.60 10*3/mm3    Lymphocytes, Absolute 2.04 0.60 - 4.20 10*3/mm3    Monocytes, Absolute 0.28 0.00 - 0.90 10*3/mm3    Eosinophils, Absolute 0.10 0.00 - 0.70 10*3/mm3    Basophils, Absolute 0.02 0.00 - 0.20 10*3/mm3    Immature Grans, Absolute 0.01 0.00 - 0.02 10*3/mm3     *Note: Due to a large number of results and/or encounters for the requested time period, some results have not been displayed. A complete set of results can be found in Results Review.

## 2025-07-22 ENCOUNTER — READMISSION MANAGEMENT (OUTPATIENT)
Dept: CALL CENTER | Facility: HOSPITAL | Age: 70
End: 2025-07-22
Payer: MEDICARE

## 2025-07-22 NOTE — OUTREACH NOTE
Prep Survey      Flowsheet Row Responses   [facility] facility patient discharged from? Non-[facility]   Is LACE score < 7 ? Non-BH Discharge   Eligibility [facility]   Date of Admission 07/18/25   Date of Discharge 07/22/25   Discharge Disposition Home or Self Care   Discharge diagnosis Atrial fibrillation with RVR   Does the patient have one of the following disease processes/diagnoses(primary or secondary)? Other   Prep survey completed? Yes            YOLANDA CASTANO - Registered Nurse

## 2025-07-23 ENCOUNTER — TRANSITIONAL CARE MANAGEMENT TELEPHONE ENCOUNTER (OUTPATIENT)
Dept: CALL CENTER | Facility: HOSPITAL | Age: 70
End: 2025-07-23
Payer: MEDICARE

## 2025-07-23 NOTE — OUTREACH NOTE
Call Center TCM Note      Flowsheet Row Responses   [facility] facility patient discharged from? Non-BH   Does the patient have one of the following disease processes/diagnoses(primary or secondary)? Other   TCM attempt successful? No   Unsuccessful attempts Attempt 1   Revoked Reason Other  [prepped in error]            CLARIBEL Viera Registered Nurse    7/23/2025, 08:10 EDT

## (undated) DEVICE — CANN SMPL SOFTECH BIFLO ETCO2 A/M 7FT

## (undated) DEVICE — MSK ENDO PORT O2 POM ELITE CURAPLEX A/

## (undated) DEVICE — GOWN,AURORA,NOREINF,RAGLAN,XL,STERILE: Brand: MEDLINE

## (undated) DEVICE — Device

## (undated) DEVICE — SINGLE-USE BIOPSY FORCEPS: Brand: RADIAL JAW 4

## (undated) DEVICE — GLV SURG SENSICARE GREEN W/ALOE PF LF 6.5 STRL

## (undated) DEVICE — SOL IRR H2O BTL 1000ML STRL

## (undated) DEVICE — GLV SURG SENSICARE GREEN W/ALOE PF LF 6 STRL

## (undated) DEVICE — GLV SURG SENSICARE GREEN W/ALOE PF LF 7 STRL

## (undated) DEVICE — GLV SURG TRIUMPH LT PF LTX 7.5 STRL

## (undated) DEVICE — BITEBLOCK ENDO W/STRAP 60F A/ LF DISP

## (undated) DEVICE — CLEANGUIDE DISPOSABLE MARKED SPRING TIP GUIDEWIRE, 1.86 MM X 210 CM: Brand: CLEANGUIDE